# Patient Record
Sex: MALE | Race: WHITE | NOT HISPANIC OR LATINO
[De-identification: names, ages, dates, MRNs, and addresses within clinical notes are randomized per-mention and may not be internally consistent; named-entity substitution may affect disease eponyms.]

---

## 2019-05-20 PROBLEM — Z00.00 ENCOUNTER FOR PREVENTIVE HEALTH EXAMINATION: Status: ACTIVE | Noted: 2019-05-20

## 2019-05-23 ENCOUNTER — APPOINTMENT (OUTPATIENT)
Dept: NEUROLOGY | Facility: CLINIC | Age: 64
End: 2019-05-23
Payer: COMMERCIAL

## 2019-05-23 VITALS
HEART RATE: 73 BPM | BODY MASS INDEX: 34.3 KG/M2 | HEIGHT: 71 IN | WEIGHT: 245 LBS | DIASTOLIC BLOOD PRESSURE: 73 MMHG | SYSTOLIC BLOOD PRESSURE: 158 MMHG

## 2019-05-23 DIAGNOSIS — I25.10 ATHEROSCLEROTIC HEART DISEASE OF NATIVE CORONARY ARTERY W/OUT ANGINA PECTORIS: ICD-10-CM

## 2019-05-23 DIAGNOSIS — G45.9 TRANSIENT CEREBRAL ISCHEMIC ATTACK, UNSPECIFIED: ICD-10-CM

## 2019-05-23 DIAGNOSIS — Z82.3 FAMILY HISTORY OF STROKE: ICD-10-CM

## 2019-05-23 DIAGNOSIS — Z78.9 OTHER SPECIFIED HEALTH STATUS: ICD-10-CM

## 2019-05-23 PROCEDURE — 99244 OFF/OP CNSLTJ NEW/EST MOD 40: CPT

## 2019-05-23 RX ORDER — DICYCLOMINE HYDROCHLORIDE 10 MG/1
10 CAPSULE ORAL
Qty: 30 | Refills: 0 | Status: ACTIVE | COMMUNITY
Start: 2018-12-14

## 2019-05-23 RX ORDER — TAMSULOSIN HYDROCHLORIDE 0.4 MG/1
0.4 CAPSULE ORAL
Qty: 90 | Refills: 0 | Status: ACTIVE | COMMUNITY
Start: 2019-01-29

## 2019-05-23 RX ORDER — LOSARTAN POTASSIUM 50 MG/1
50 TABLET, FILM COATED ORAL
Qty: 90 | Refills: 0 | Status: ACTIVE | COMMUNITY
Start: 2019-01-29

## 2019-05-23 RX ORDER — LIFITEGRAST 50 MG/ML
5 SOLUTION/ DROPS OPHTHALMIC
Qty: 60 | Refills: 0 | Status: ACTIVE | COMMUNITY
Start: 2019-03-07

## 2019-05-23 RX ORDER — AZELASTINE HYDROCHLORIDE 137 UG/1
0.1 SPRAY, METERED NASAL
Qty: 30 | Refills: 0 | Status: ACTIVE | COMMUNITY
Start: 2018-12-27

## 2019-05-23 RX ORDER — CARVEDILOL 6.25 MG/1
6.25 TABLET, FILM COATED ORAL
Qty: 180 | Refills: 0 | Status: ACTIVE | COMMUNITY
Start: 2019-04-02

## 2019-05-23 NOTE — HISTORY OF PRESENT ILLNESS
[FreeTextEntry1] : Mr. Moore is known to me from my previous practice. \par He has multiple problems including memory complaints, paresthesias and obstructive sleep apnea.\par \par He typically goes to bed between 10-10:30 PM. At times he is restless. \par He wakes up at 4 AM and has difficulty falling back to sleep.\par He is concerned that his CPAP equipment is not functioning optimally. He is noticing some leakage. \par He is using a nasal mask.\par He is not aware of opening his mouth during sleep. His mouth is not dry in the morning.\par His wife does not notice snoring when he uses it at night.\par \par He denies having any symptoms of Restless Legs Syndrome. His wife does not report kicking of his legs at night.\par \par He has been diagnosed with neuropathy.\par He wears wrist splints at night for carpal tunnel syndrome. \par \par He is somewhat tired during the day.\par He does take naps during the day.\par \par There is no history of dream enactment behavior, hypnagogic/hypnopompic hallucinations, sleep paralysis or cataplexy.\par \par His Sherwood Sleepiness Scale Score is 18/24.\par \par

## 2019-05-23 NOTE — CONSULT LETTER
[Dear  ___] : Dear  [unfilled], [Consult Letter:] : I had the pleasure of evaluating your patient, [unfilled]. [Please see my note below.] : Please see my note below. [Consult Closing:] : Thank you very much for allowing me to participate in the care of this patient.  If you have any questions, please do not hesitate to contact me. [FreeTextEntry2] : Shelby Archuleta [FreeTextEntry3] : Sincerely,\par \par \par Angi Pierson MD\par Diplomate, American Academy of Psychiatry and Neurology\par Board Certified in the Subspecialty of Clinical Neurophysiology\par Board Certified in the Subspecialty of Sleep Medicine\par Board Certified in the Subspecialty of Epilepsy\par

## 2019-05-23 NOTE — DATA REVIEWED
[de-identified] : MRI brain 4/25/16: minimal white matter microvascular disease [de-identified] : Neurotrax 5/24/16: Mild cognitive impairment. Cognitive domain score for verbal function is impaired [de-identified] : \par \par Split Night Study 5/9/17: AHI 62.5 No PLMS\par Therapeutic portion: optimal pressure at 12 cm H2O\par

## 2019-05-23 NOTE — PHYSICAL EXAM
[FreeTextEntry1] : Examination:\par Constitutional: normal, no apparent distress\par Eyes: normal conjunctiva b/l, no ptosis, visual fields full\par oropharynx: narrow\par Respiratory: no respiratory distress, normal effort, normal auscultation\par Cardiovascular: normal rate, rhythm, no murmurs\par Neck: supple, no masses\par Vascular: carotids normal\par Skin: normal color, no rashes\par Psych: normal mood, affect\par \par Neurological:\par Memory: normal memory, oriented to person, place, time\par Language intact/no aphasia\par Cranial Nerves: II-XII intact, Pupils equally round and reactive to light, ocular muscles/movements intact, no ptosis, no facial weakness, tongue protrudes normally in the midline, \par Motor: normal tone, no pronator drift, full strength in all four extremities in the proximal and distal muscle groups\par Coordination: Fine motor movements intact, rapid alternating movements intact, finger to nose intact bilaterally\par Sensory: intact to light touch\par DTRs: symmetric,diffusely hypoactive\par Gait: narrow based, steady\par \par \par

## 2019-05-23 NOTE — REVIEW OF SYSTEMS
[Feeling Tired] : feeling tired [As Noted in HPI] : as noted in HPI [Cough] : cough [Heartburn] : heartburn [Negative] : Genitourinary [FreeTextEntry2] : night sweats [de-identified] : impaired memory [FreeTextEntry9] : back pain

## 2019-05-23 NOTE — DISCUSSION/SUMMARY
[FreeTextEntry1] : Mr. Moore is a 63 year old man with multiple medical problems including obstructive sleep apnea, neuropathy, mild cognitive impairment.\par \par 1. Obstructive sleep apnea - Previous polysomnogram showed evidence of severe MILLICENT. He has been compliant with CPAP at 12 cm H2O but continues to have daytime sleepiness.\par His [employer] company will transfer his data so that I can review efficacy and compliance data. If needed I will increase the pressure.\par A new nasal mask and hose will be ordered.\par If compliance and efficacy numbers look optimal I will consider addition of Modafinil.\par \par 2. Mild cognitive impairment - He had adverse effects to Aricept in the past. Continue use of CPAP for MILLICENT. Continue to participate in physically and mentally stimulating activities. \par \par 3. Neuropathy - Blood sugar control. Continue gabapentin.\par \par Follow-up in 6-12 months, sooner if needed.

## 2020-01-15 ENCOUNTER — APPOINTMENT (OUTPATIENT)
Dept: NEUROLOGY | Facility: CLINIC | Age: 65
End: 2020-01-15
Payer: MEDICARE

## 2020-01-15 VITALS
HEART RATE: 73 BPM | WEIGHT: 240 LBS | DIASTOLIC BLOOD PRESSURE: 73 MMHG | HEIGHT: 71 IN | SYSTOLIC BLOOD PRESSURE: 134 MMHG | BODY MASS INDEX: 33.6 KG/M2

## 2020-01-15 PROCEDURE — 99214 OFFICE O/P EST MOD 30 MIN: CPT

## 2020-01-15 NOTE — HISTORY OF PRESENT ILLNESS
[FreeTextEntry1] : I last saw Mr. Moore on 5/23/19.\par \par He says that he describes himself as being very ambivalent and having no motivation.\par He is not sure if he is depressed. He does state that things that used to be enjoyable are not as enjoyable to him. He says that he has the "January blues".\par He has some difficulty multi tasking. \par He is taking citalopram.\par \par The tingling in his extremities has increased. He is using wrist splints for carpal tunnel. He is also taking gabapentin.\par \par He has been seeing Dr. Jean for pain management and has had some improvement with injections.\par \par He had an upper respiratory infection in November and since that time has had vertigo with a spinning sensation with movement. He feels wobbly if he stands up too quickly.\par He was also diagnosed with a sinus infection when his symptoms did not improve.\par He has not done any vestibular therapy.\par \par He does not feel sleepy.\par His compliance report shows that he is using his CPAP machine about 8.5 hours per night. The average AHI is 0.3.

## 2020-01-15 NOTE — DISCUSSION/SUMMARY
[FreeTextEntry1] : Mr. Moore is a 63 year old man with multiple medical problems including obstructive sleep apnea, neuropathy, mild cognitive impairment. He now has vertigo as well as worsening of mood.\par \par 1. Obstructive sleep apnea - Previous polysomnogram showed evidence of severe MILLICENT. He has been compliant with CPAP at 12 cm H2O. He feels a benefit and will continue to use CPAP on a nightly basis.\par \par 2. Mild cognitive impairment - He had adverse effects to Aricept in the past. Continue use of CPAP for MILLICENT. Continue to participate in physically and mentally stimulating activities. There may also be an element of depression contributing. Will increase citalopram as below.\par \par 3. Neuropathy - Blood sugar control. Continue gabapentin.\par \par 4. Vertigo - LIkely peripheral vertigo related to recent URI/sinus infection (this is when symptoms started). Given persistence of symptoms will refer to vestibular therapy. If vestibular therapy is not successful will get MRI brain.\par \par 5. Depression, possible seasonal affective disorder - Increase citalopram from 10 mg to 20 mg. Discussed possible side effects.\par \par Follow-up in 3-4 months, sooner if needed.

## 2020-01-15 NOTE — DATA REVIEWED
[de-identified] : MRI brain 4/25/16: minimal white matter microvascular disease [de-identified] : Neurotrax 5/24/16: Mild cognitive impairment. Cognitive domain score for verbal function is impaired [de-identified] : \par Split Night Study 5/9/17: AHI 62.5 No PLMS\par Therapeutic portion: optimal pressure at 12 cm H2O\par

## 2020-03-20 ENCOUNTER — APPOINTMENT (OUTPATIENT)
Dept: NEUROLOGY | Facility: CLINIC | Age: 65
End: 2020-03-20

## 2020-03-20 ENCOUNTER — TRANSCRIPTION ENCOUNTER (OUTPATIENT)
Age: 65
End: 2020-03-20

## 2020-06-01 ENCOUNTER — APPOINTMENT (OUTPATIENT)
Dept: NEUROLOGY | Facility: CLINIC | Age: 65
End: 2020-06-01

## 2020-07-05 ENCOUNTER — RX RENEWAL (OUTPATIENT)
Age: 65
End: 2020-07-05

## 2020-08-10 ENCOUNTER — RX RENEWAL (OUTPATIENT)
Age: 65
End: 2020-08-10

## 2020-09-08 ENCOUNTER — RX RENEWAL (OUTPATIENT)
Age: 65
End: 2020-09-08

## 2020-10-28 ENCOUNTER — APPOINTMENT (OUTPATIENT)
Dept: NEUROLOGY | Facility: CLINIC | Age: 65
End: 2020-10-28
Payer: MEDICARE

## 2020-10-28 VITALS
WEIGHT: 240 LBS | DIASTOLIC BLOOD PRESSURE: 78 MMHG | BODY MASS INDEX: 33.6 KG/M2 | HEART RATE: 74 BPM | TEMPERATURE: 97.5 F | HEIGHT: 71 IN | SYSTOLIC BLOOD PRESSURE: 140 MMHG

## 2020-10-28 PROCEDURE — 99214 OFFICE O/P EST MOD 30 MIN: CPT

## 2020-10-28 NOTE — CONSULT LETTER
[Dear  ___] : Dear  [unfilled], [Consult Letter:] : I had the pleasure of evaluating your patient, [unfilled]. [Please see my note below.] : Please see my note below. [Consult Closing:] : Thank you very much for allowing me to participate in the care of this patient.  If you have any questions, please do not hesitate to contact me. [FreeTextEntry2] : Shelby Archuleta [FreeTextEntry3] : Sincerely,\par \par \par Angi iPerson MD\par Diplomate, American Academy of Psychiatry and Neurology\par Board Certified in the Subspecialty of Clinical Neurophysiology\par Board Certified in the Subspecialty of Sleep Medicine\par Board Certified in the Subspecialty of Epilepsy\par

## 2020-10-28 NOTE — DATA REVIEWED
[de-identified] : MRI brain 4/25/16: minimal white matter microvascular disease [de-identified] : Neurotrax 5/24/16: Mild cognitive impairment. Cognitive domain score for verbal function is impaired [de-identified] : \par Split Night Study 5/9/17: AHI 62.5 No PLMS\par Therapeutic portion: optimal pressure at 12 cm H2O\par

## 2020-10-28 NOTE — HISTORY OF PRESENT ILLNESS
[FreeTextEntry1] : I last saw Mr. Moore on 1/15/20.\par \par He recently had a removal of a ganglion cyst on the left hand.\par \par Today he reports several concerns.\par \par Memory:\par -He reports difficulty with processing information\par -Difficulty with multi-tasking\par -Forgetfulness - forgetting to zip up pants after using the rest room, difficulty navigating in familiar places\par -His family has noticed that he is repetitive - asks the same questions.\par \par Neuropathy\par -Numbness and tingling in fingers and feet\par \par Vestibular dysfunction\par -Difficulty with balance\par -He did not get around to going for vestibular therapy due to the pandemic.\par \par He also reports seeing floaters in his left eye.\par \par He sleeps well when he sleeps on his back. \par he has no difficulty with his CPAP machine. \par \par HIs back is feeling much better. He had an epidural injection a few months ago.

## 2020-10-28 NOTE — PHYSICAL EXAM
[FreeTextEntry1] : Examination:\par Constitutional: normal, no apparent distress\par Eyes: normal conjunctiva b/l, no ptosis, visual fields full\par Respiratory: no respiratory distress, normal effort, normal auscultation\par Cardiovascular: normal rate, rhythm, no murmurs\par Neck: supple, no masses\par Vascular: carotids normal\par Skin: normal color, no rashes\par Psych: normal mood, affect\par \par Neurological:\par Memory:  oriented to person, place, time\par Language intact/no aphasia\par Cranial Nerves: II-XII intact, Pupils equally round and reactive to light, ocular muscles/movements intact, no ptosis, no facial weakness, tongue protrudes normally in the midline, \par Motor: normal tone, no pronator drift, full strength in all four extremities in the proximal and distal muscle groups\par Coordination: Fine motor movements intact, rapid alternating movements intact, finger to nose intact bilaterally\par Sensory: intact to light touch\par DTRs: symmetric, normal\par Gait: narrow based, steady\par

## 2020-10-28 NOTE — DISCUSSION/SUMMARY
[FreeTextEntry1] : Mr. Moore is a 64 year old man with multiple medical problems including obstructive sleep apnea, neuropathy, mild cognitive impairment. He now has vertigo as well as worsening of mood.\par \par 1. Obstructive sleep apnea - Previous polysomnogram showed evidence of severe MILLICENT. He has been compliant with CPAP at 12 cm H2O. He feels a benefit and will continue to use CPAP on a nightly basis.\par \par 2. Mild cognitive impairment - He had adverse effects to Aricept in the past. Continue use of CPAP for MILLICENT. \par Will refer for formal neuropsychological evaluation with Dr. Garcia.\par If evidence of mild dementia, can start Namenda.\par Continue to participate in physically and mentally stimulating activities. \par He reports that mood is good at this time but he has difficulty getting motivated. \par \par 3. Neuropathy - Blood sugar control. Continue gabapentin.\par \par 4. Vertigo - LIkely peripheral vertigo. \par Referral to vestibular therapy. \par Repeat MRI brain if therapy is not helpful. \par \par 5. Depression, possible seasonal affective disorder\par Continue citalopram 20 mg\par \par Follow-up in 3-4 months, sooner if needed. \par \par

## 2021-02-09 ENCOUNTER — LABORATORY RESULT (OUTPATIENT)
Age: 66
End: 2021-02-09

## 2021-02-09 ENCOUNTER — APPOINTMENT (OUTPATIENT)
Dept: PULMONOLOGY | Facility: CLINIC | Age: 66
End: 2021-02-09
Payer: MEDICARE

## 2021-02-09 VITALS
SYSTOLIC BLOOD PRESSURE: 140 MMHG | DIASTOLIC BLOOD PRESSURE: 80 MMHG | HEART RATE: 76 BPM | WEIGHT: 254 LBS | HEIGHT: 71 IN | OXYGEN SATURATION: 98 % | TEMPERATURE: 97.9 F | BODY MASS INDEX: 35.56 KG/M2

## 2021-02-09 DIAGNOSIS — Q21.1 ATRIAL SEPTAL DEFECT: ICD-10-CM

## 2021-02-09 DIAGNOSIS — Z82.49 FAMILY HISTORY OF ISCHEMIC HEART DISEASE AND OTHER DISEASES OF THE CIRCULATORY SYSTEM: ICD-10-CM

## 2021-02-09 PROCEDURE — 99205 OFFICE O/P NEW HI 60 MIN: CPT

## 2021-02-09 RX ORDER — AZITHROMYCIN 250 MG/1
250 TABLET, FILM COATED ORAL
Qty: 6 | Refills: 0 | Status: COMPLETED | COMMUNITY
Start: 2019-03-14 | End: 2021-02-09

## 2021-02-09 RX ORDER — TIZANIDINE 4 MG/1
4 TABLET ORAL
Qty: 60 | Refills: 0 | Status: DISCONTINUED | COMMUNITY
Start: 2019-03-13 | End: 2021-02-09

## 2021-02-09 RX ORDER — TRAMADOL HYDROCHLORIDE 50 MG/1
50 TABLET, COATED ORAL
Qty: 20 | Refills: 0 | Status: DISCONTINUED | COMMUNITY
Start: 2018-12-11 | End: 2021-02-09

## 2021-02-09 RX ORDER — AMOXICILLIN 500 MG/1
500 CAPSULE ORAL
Qty: 30 | Refills: 0 | Status: DISCONTINUED | COMMUNITY
Start: 2019-01-24 | End: 2021-02-09

## 2021-02-09 RX ORDER — AMOXICILLIN AND CLAVULANATE POTASSIUM 875; 125 MG/1; MG/1
875-125 TABLET, COATED ORAL
Qty: 20 | Refills: 0 | Status: DISCONTINUED | COMMUNITY
Start: 2019-01-09 | End: 2021-02-09

## 2021-02-09 RX ORDER — METHYLPREDNISOLONE 4 MG/1
4 TABLET ORAL
Qty: 21 | Refills: 0 | Status: DISCONTINUED | COMMUNITY
Start: 2019-01-09 | End: 2021-02-09

## 2021-02-09 NOTE — HISTORY OF PRESENT ILLNESS
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 Patient states that his cough occurred acutely without associated chest pains, palpitations, lightheadedness, dizziness, leg edema, paroxysmal nocturnal dyspnea, orthopnea. He has had no complaints of nocturnal awakenings secondary to the above. History is negative for rashes, myalgias, arthralgias. No history of new exposures. Patient denies any prior history of nausea, vomiting, or diarrhea.\par \par Patient is compliant with his CPAP of 12, being managed at another practice. No complaints of excessive daytime somnolence, snoring, witnessed apneas on therapy. \par \par Patient was seen by ENT regarding this and except for a benign lesion on his glottis. No other findings were noted. A CAT scan of the chest was performed [ESS] : 0

## 2021-02-09 NOTE — DISCUSSION/SUMMARY
[FreeTextEntry1] : 65-year-old male, seen today for complaints of cough. Incidentally, the patient was found to have multiple small tumor nodules, largest measuring 10 mm in size most localized to the right upper and lower lobes. These ill-defined lesions may be postinflammatory in nature such as residua from Covid 19, hypersensitivity pneumonitis, early, AIP, vasculitis. This is somewhat inconsistent with his cough in light of the lack of severity of these lesions in number and size. Questionable reactive airways disease.

## 2021-02-09 NOTE — CONSULT LETTER
[Dear  ___] : Dear  [unfilled], [Please see my note below.] : Please see my note below. [Consult Letter:] : I had the pleasure of evaluating your patient, [unfilled]. [Consult Closing:] : Thank you very much for allowing me to participate in the care of this patient.  If you have any questions, please do not hesitate to contact me. [Sincerely,] : Sincerely, [FreeTextEntry3] : Jb Young MD FCCP\par Pulmonary/Critical Care/Sleep Medicine\par Department of Internal Medicine\par \par [facility] [employer]\par  [DrKenneth  ___] : Dr. GALINDO

## 2021-02-09 NOTE — PHYSICAL EXAM
[No Acute Distress] : no acute distress [Normal Oropharynx] : normal oropharynx [IV] : Mallampati Class: IV [Normal Appearance] : normal appearance [Neck Circumference: ___] : neck circumference: [unfilled] [No Neck Mass] : no neck mass [Normal Rate/Rhythm] : normal rate/rhythm [Normal S1, S2] : normal s1, s2 [No Murmurs] : no murmurs [No Resp Distress] : no resp distress [Clear to Auscultation Bilaterally] : clear to auscultation bilaterally [No Abnormalities] : no abnormalities [Benign] : benign [Normal Gait] : normal gait [No Clubbing] : no clubbing [No Cyanosis] : no cyanosis [No Edema] : no edema [FROM] : FROM [Normal Color/ Pigmentation] : normal color/ pigmentation [No Focal Deficits] : no focal deficits [Oriented x3] : oriented x3 [Normal Affect] : normal affect [TextBox_2] : Obese

## 2021-02-10 LAB
CK SERPL-CCNC: 94 U/L
MPO AB + PR3 PNL SER: NORMAL
NT-PROBNP SERPL-MCNC: 21 PG/ML
RHEUMATOID FACT SER QL: 10 IU/ML
SARS-COV-2 IGG SERPL IA-ACNC: 0.06 INDEX
SARS-COV-2 IGG SERPL QL IA: NEGATIVE

## 2021-02-11 LAB
ALDOLASE SERPL-CCNC: 7.5 U/L
ANA PAT FLD IF-IMP: ABNORMAL
ANA SER IF-ACNC: ABNORMAL
DSDNA AB SER-ACNC: <12 IU/ML
ENA JO1 AB SER IA-ACNC: <0.2 AL
ENA SCL70 IGG SER IA-ACNC: <0.2 AL
FUNGITELL QUANTITATIVE VALUE: <31 PG/ML
M TB IFN-G BLD-IMP: NEGATIVE
QUANTIFERON TB PLUS MITOGEN MINUS NIL: >10 IU/ML
QUANTIFERON TB PLUS NIL: 0.02 IU/ML
QUANTIFERON TB PLUS TB1 MINUS NIL: 0 IU/ML
QUANTIFERON TB PLUS TB2 MINUS NIL: 0 IU/ML

## 2021-02-11 RX ORDER — ALBUTEROL SULFATE 90 UG/1
108 (90 BASE) POWDER, METERED RESPIRATORY (INHALATION) EVERY 4 HOURS
Qty: 1 | Refills: 5 | Status: COMPLETED | COMMUNITY
Start: 2021-02-09 | End: 2021-02-11

## 2021-02-12 DIAGNOSIS — Z01.818 ENCOUNTER FOR OTHER PREPROCEDURAL EXAMINATION: ICD-10-CM

## 2021-02-13 ENCOUNTER — APPOINTMENT (OUTPATIENT)
Dept: DISASTER EMERGENCY | Facility: CLINIC | Age: 66
End: 2021-02-13

## 2021-02-14 LAB — SARS-COV-2 N GENE NPH QL NAA+PROBE: NOT DETECTED

## 2021-02-16 ENCOUNTER — APPOINTMENT (OUTPATIENT)
Dept: PULMONOLOGY | Facility: CLINIC | Age: 66
End: 2021-02-16
Payer: MEDICARE

## 2021-02-16 VITALS
RESPIRATION RATE: 16 BRPM | HEART RATE: 63 BPM | DIASTOLIC BLOOD PRESSURE: 70 MMHG | OXYGEN SATURATION: 96 % | SYSTOLIC BLOOD PRESSURE: 120 MMHG

## 2021-02-16 VITALS — BODY MASS INDEX: 36.2 KG/M2 | WEIGHT: 250 LBS | TEMPERATURE: 97.3 F | HEIGHT: 69.5 IN

## 2021-02-16 DIAGNOSIS — R91.8 OTHER NONSPECIFIC ABNORMAL FINDING OF LUNG FIELD: ICD-10-CM

## 2021-02-16 DIAGNOSIS — R05 COUGH: ICD-10-CM

## 2021-02-16 PROCEDURE — 99214 OFFICE O/P EST MOD 30 MIN: CPT | Mod: 25

## 2021-02-16 PROCEDURE — 94010 BREATHING CAPACITY TEST: CPT

## 2021-02-16 PROCEDURE — 85018 HEMOGLOBIN: CPT | Mod: QW

## 2021-02-16 PROCEDURE — 94727 GAS DIL/WSHOT DETER LNG VOL: CPT

## 2021-02-16 PROCEDURE — 94729 DIFFUSING CAPACITY: CPT

## 2021-02-16 NOTE — CONSULT LETTER
[Dear  ___] : Dear  [unfilled], [Consult Letter:] : I had the pleasure of evaluating your patient, [unfilled]. [Please see my note below.] : Please see my note below. [Consult Closing:] : Thank you very much for allowing me to participate in the care of this patient.  If you have any questions, please do not hesitate to contact me. [Sincerely,] : Sincerely, [DreKnneth  ___] : Dr. GALINDO [FreeTextEntry3] : Jb Young MD FCCP\par Pulmonary/Critical Care/Sleep Medicine\par Department of Internal Medicine\par \par [facility]\par

## 2021-02-16 NOTE — DISCUSSION/SUMMARY
[FreeTextEntry1] : 64 yo male, seen today for complaints of a chronic cough. Patient's symptoms have spontaneously improved. Lung lesion is most likely inflammatory in nature. No evidence of connective tissue diseases. Patient advised to continue to use his short acting bronchodilator as needed. Followup CAT scan in 3 months. Patient advised he still may require an open lung biopsy

## 2021-02-16 NOTE — HISTORY OF PRESENT ILLNESS
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 Patient states that his cough occurred acutely without associated chest pains, palpitations, lightheadedness, dizziness, leg edema, paroxysmal nocturnal dyspnea, orthopnea. He has had no complaints of nocturnal awakenings secondary to the above. History is negative for rashes, myalgias, arthralgias. No history of new exposures. Patient denies any prior history of nausea, vomiting, or diarrhea.\par \par Patient is compliant with his CPAP of 12, being managed at another practice. No complaints of excessive daytime somnolence, snoring, witnessed apneas on therapy. \par \par Patient was seen by ENT regarding this and except for a benign lesion on his glottis. No other findings were noted. A CAT scan of the chest was performed and the patient was found to have multiple areas of ill-defined nodule in the changes up to 10 mm in size in the right upper lobe with smaller lesions in the right lower lobe. No bronchiectasis\par \par he was felt to have possible reactive airways disease ith residual from Covid 19 versus hypersensitivity pneumonitis versus AIP versus vasculitis.Serologies were ordered as below.\par \par 2/16/21: [Awakes Unrefreshed] : does not awaken unrefreshed [Awakes with Dry Mouth] : does not awaken with dry mouth [Snoring] : no snoring [Witnessed Apneas] : no witnessed apneas [TextBox_100] : 5/9/17 [TextBox_108] : 62.5 [TextBox_104] : 5/9/17 [TextBox_131] : 12 [TextBox_165] : managed by another group [ESS] : 0

## 2021-03-12 ENCOUNTER — APPOINTMENT (OUTPATIENT)
Dept: NEUROLOGY | Facility: CLINIC | Age: 66
End: 2021-03-12
Payer: MEDICARE

## 2021-03-12 VITALS
DIASTOLIC BLOOD PRESSURE: 78 MMHG | SYSTOLIC BLOOD PRESSURE: 138 MMHG | HEIGHT: 69.5 IN | TEMPERATURE: 97.6 F | HEART RATE: 76 BPM

## 2021-03-12 PROCEDURE — 99214 OFFICE O/P EST MOD 30 MIN: CPT

## 2021-03-12 NOTE — DATA REVIEWED
[de-identified] : MRI brain 4/25/16: minimal white matter microvascular disease [de-identified] : Neurotrax 5/24/16: Mild cognitive impairment. Cognitive domain score for verbal function is impaired\par \par Neuropsych evaluation January 2021: Mild cognitive disorder, Dysthymic disorder [de-identified] : \par Split Night Study 5/9/17: AHI 62.5 No PLMS\par Therapeutic portion: optimal pressure at 12 cm H2O\par

## 2021-03-12 NOTE — HISTORY OF PRESENT ILLNESS
[FreeTextEntry1] : Mr. Moore was last seen on 10/28/20.\par \par He is here today with his wife.\par He notices that his memory is not as good as it used to be.\par \par He had neuropsych testing which was suggestive of mild cognitive impairment and at this time is more reflective of a persistent mild cognitive impairment as opposed as a dementing process.\par \par His wife does think that he is depressed.\par He describes himself as "Eeyore" from [employer].\par He does think that citalopram has helped. He is taking 20 mg.\par He feels that citalopram allows him to cope with stress.\par \par He is sleeping well.\par He is using his CPAP on a nightly basis. He needs new supplies.\par \par Dizziness is improved overall.\par \par Metformin was increased.

## 2021-03-12 NOTE — DISCUSSION/SUMMARY
[FreeTextEntry1] : iscussion/Summary\par Mr. Moore is a 65 year old man with multiple medical problems including obstructive sleep apnea, neuropathy, mild cognitive impairment. He now has vertigo as well as worsening of mood.\par \par 1. Obstructive sleep apnea - Previous polysomnogram showed evidence of severe MILLICENT. He has been compliant with CPAP at 12 cm H2O. He feels a benefit and will continue to use CPAP on a nightly basis.\par I will send an order for new supplies\par \par 2. Mild cognitive impairment - Neuropsych testing shows stable MCI and does not suggest a progressive dementing process.\par Continue to participate in physically and mentally stimulating activities. \par Dysthymic disorder also likely contributing.\par \par 3. Neuropathy - Blood sugar control. Continue gabapentin.\par \par 4. Vertigo - LIkely peripheral vertigo. \par Overall symptoms are much improved.\par \par 5. Depression, dysthymic disorder.\par Will increase citalopram to 30 mg/day. if he has side effects, decrease back to 20 mg/day.\par \par Follow-up in 4-6 months, sooner if needed. \par

## 2021-03-12 NOTE — PHYSICAL EXAM
[FreeTextEntry1] : Examination:\par Constitutional: normal, no apparent distress\par Eyes: normal conjunctiva b/l, no ptosis, visual fields full\par Respiratory: no respiratory distress, normal effort, normal auscultation\par Cardiovascular: normal rate, rhythm, no murmurs\par Neck: supple, no masses\par Vascular: carotids normal\par Skin: normal color, no rashes\par Psych: normal mood, affect\par \par Neurological:\par Memory: normal memory, oriented to person, place, time\par Language intact/no aphasia\par Cranial Nerves: II-XII intact, Pupils equally round and reactive to light, ocular muscles/movements intact, no ptosis, no facial weakness, tongue protrudes normally in the midline, \par Motor: normal tone, no pronator drift, full strength in all four extremities in the proximal and distal muscle groups\par Coordination: Fine motor movements intact, rapid alternating movements intact, finger to nose intact bilaterally\par Sensory: intact to light touch\par DTRs: symmetric, hypoactive,.\par Gait: narrow based, steady\par

## 2021-05-01 ENCOUNTER — APPOINTMENT (OUTPATIENT)
Dept: DISASTER EMERGENCY | Facility: CLINIC | Age: 66
End: 2021-05-01

## 2021-05-04 ENCOUNTER — APPOINTMENT (OUTPATIENT)
Dept: PULMONOLOGY | Facility: CLINIC | Age: 66
End: 2021-05-04

## 2021-05-04 RX ORDER — OFLOXACIN OTIC 3 MG/ML
0.3 SOLUTION AURICULAR (OTIC)
Qty: 5 | Refills: 0 | Status: DISCONTINUED | COMMUNITY
Start: 2019-01-24 | End: 2021-05-04

## 2021-05-04 RX ORDER — CLOPIDOGREL BISULFATE 75 MG/1
75 TABLET, FILM COATED ORAL
Qty: 90 | Refills: 0 | Status: DISCONTINUED | COMMUNITY
Start: 2019-02-23 | End: 2021-05-04

## 2021-05-04 RX ORDER — CITALOPRAM HYDROBROMIDE 20 MG/1
20 TABLET, FILM COATED ORAL
Qty: 30 | Refills: 2 | Status: DISCONTINUED | COMMUNITY
Start: 2020-01-15 | End: 2021-05-04

## 2021-05-04 RX ORDER — CITALOPRAM HYDROBROMIDE 10 MG/1
10 TABLET, FILM COATED ORAL
Qty: 90 | Refills: 0 | Status: DISCONTINUED | COMMUNITY
Start: 2019-02-23 | End: 2021-05-04

## 2021-05-04 RX ORDER — NEOMYCIN SULFATE, POLYMYXIN B SULFATE AND DEXAMETHASONE 3.5; 10000; 1 MG/ML; [USP'U]/ML; MG/ML
3.5-10000-0.1 SUSPENSION OPHTHALMIC
Qty: 5 | Refills: 0 | Status: DISCONTINUED | COMMUNITY
Start: 2019-01-24 | End: 2021-05-04

## 2021-06-30 ENCOUNTER — APPOINTMENT (OUTPATIENT)
Dept: ENDOCRINOLOGY | Facility: CLINIC | Age: 66
End: 2021-06-30
Payer: MEDICARE

## 2021-06-30 VITALS
HEART RATE: 90 BPM | HEIGHT: 69.5 IN | BODY MASS INDEX: 35.47 KG/M2 | WEIGHT: 245 LBS | DIASTOLIC BLOOD PRESSURE: 70 MMHG | SYSTOLIC BLOOD PRESSURE: 122 MMHG

## 2021-06-30 LAB
GLUCOSE BLDC GLUCOMTR-MCNC: 107
HBA1C MFR BLD HPLC: 7.4

## 2021-06-30 PROCEDURE — 82962 GLUCOSE BLOOD TEST: CPT

## 2021-06-30 PROCEDURE — 99204 OFFICE O/P NEW MOD 45 MIN: CPT | Mod: 25

## 2021-06-30 RX ORDER — ASPIRIN 81 MG
81 TABLET, DELAYED RELEASE (ENTERIC COATED) ORAL DAILY
Qty: 90 | Refills: 0 | Status: ACTIVE | COMMUNITY

## 2021-06-30 NOTE — REASON FOR VISIT
[Initial Evaluation] : an initial evaluation [DM Type 2] : DM Type 2 [Other___] : [unfilled] [FreeTextEntry2] : Dr. Macey Gutierrez

## 2021-06-30 NOTE — ASSESSMENT
[Importance of Diet and Exercise] : importance of diet and exercise to improve glycemic control, achieve weight loss and improve cardiovascular health [FreeTextEntry1] : 66 y/o male with hx of TIA and MI along with type 2 diabetes and hyperlipidemia. \par \par Plan: \par Type 2 DM: \par - continue Metformin for now, may replace with Jardiance in the future for added cardiac protection, will check renal function first \par - educated on healthy food choices\par - increase routine exercise \par - schedule appointment with [facility] glucose meter teaching, diabetes and diet education \par \par Hyperlipidemia: check lipids now \par - continue statin \par \par RTO in 6-8 weeks \par

## 2021-06-30 NOTE — PHYSICAL EXAM
[Alert] : alert [Well Nourished] : well nourished [No Acute Distress] : no acute distress [Well Developed] : well developed [Normal Sclera/Conjunctiva] : normal sclera/conjunctiva [EOMI] : extra ocular movement intact [Thyroid Not Enlarged] : the thyroid was not enlarged [No LAD] : no lymphadenopathy [No Thyroid Nodules] : no palpable thyroid nodules [No Respiratory Distress] : no respiratory distress [No Accessory Muscle Use] : no accessory muscle use [Normal Rate and Effort] : normal respiratory rate and effort [Clear to Auscultation] : lungs were clear to auscultation bilaterally [Normal S1, S2] : normal S1 and S2 [Normal Rate] : heart rate was normal [Regular Rhythm] : with a regular rhythm [No Edema] : no peripheral edema [Pedal Pulses Normal] : the pedal pulses are present [Normal Bowel Sounds] : normal bowel sounds [Not Tender] : non-tender [Soft] : abdomen soft [Normal Gait] : normal gait [No Rash] : no rash [Right Foot Was Examined] : right foot ~C was examined [Left Foot Was Examined] : left foot ~C was examined [Normal] : normal [#1 Diminished] : number 1 was diminished [#7 Diminished] : number 7 was diminished [#8 Diminished] : number 8 was diminished [#9 Diminished] : number 9 was diminished [No Tremors] : no tremors [Oriented x3] : oriented to person, place, and time [Acanthosis Nigricans] : no acanthosis nigricans [Foot Ulcers] : no foot ulcers [#2 Diminished] : number 2 was normal [#3 Diminished] : number 3 was normal [#4 Diminished] : number 4 was normal [#5 Diminished] : number 5 was normal [#6 Diminished] : number 6 was normal [de-identified] : mildly impaired judgement

## 2021-06-30 NOTE — REVIEW OF SYSTEMS
[Fatigue] : fatigue [Recent Weight Loss (___ Lbs)] : recent weight loss: [unfilled] lbs [Diarrhea] : diarrhea [Decreased Appetite] : appetite not decreased [Recent Weight Gain (___ Lbs)] : no recent weight gain [Visual Field Defect] : no visual field defect [Blurred Vision] : no blurred vision [Dysphagia] : no dysphagia [Neck Pain] : no neck pain [Dysphonia] : no dysphonia [Chest Pain] : no chest pain [Palpitations] : no palpitations [Constipation] : no constipation [Polyuria] : no polyuria [Dysuria] : no dysuria [Headaches] : no headaches [Tremors] : no tremors [Depression] : no depression [Polydipsia] : no polydipsia [Swelling] : no swelling [FreeTextEntry3] : floaters in eyes with tried  [FreeTextEntry7] : at times

## 2021-06-30 NOTE — HISTORY OF PRESENT ILLNESS
[FreeTextEntry1] : Patient reports in 2013 he had TIA and PFO in [place] then was started on Metformin. Over the last couple of months he has become lethargic after eating breakfast with eye floaters then he has sugar followed by nausea and diarrhea. Has not tested his blood sugar during these episodes. \par \par Quality: Type 2 DM\par Severity: moderate \par Duration: 2013 \par Onset: TIA \par Modifying Factors: Jayla with medication \par Associated Symptoms: denies polyuria or polydipsia \par Family History: Brother ans Sister with HTN \par \par Current Regimen:\par Metformin 500 mg BID \par \par Self blood sugar monitoring: none\par Current \par \par exercise: yard work, no routine exercise\par \par Diet:\par B- pancake with butter, cereal, fruit and egg with cheese on toast, 2% milk\par L- skips or cold cut sandwich, chips, unsweetened tea \par D- protein, salad, apple sauce, vegetables. \par bedtime snack - banana \par \par \par Date of last eye exam: 1/2021 (-) DR, dry eyes \par Date of last foot exam: fall with podiatry, mild neuropathy \par Date of last flu vaccine: 2020\par Date of last Pneumovax: March 2021\par \par PMH: \par HLD\par MI\par HTN \par TIA

## 2021-07-13 RX ORDER — EMPAGLIFLOZIN 10 MG/1
10 TABLET, FILM COATED ORAL DAILY
Qty: 90 | Refills: 1 | Status: DISCONTINUED | COMMUNITY
Start: 2021-07-13 | End: 2021-07-13

## 2021-07-13 RX ORDER — METFORMIN HYDROCHLORIDE 500 MG/1
500 TABLET, COATED ORAL
Qty: 180 | Refills: 0 | Status: DISCONTINUED | COMMUNITY
Start: 2019-03-19 | End: 2021-07-13

## 2021-08-04 ENCOUNTER — APPOINTMENT (OUTPATIENT)
Dept: ENDOCRINOLOGY | Facility: CLINIC | Age: 66
End: 2021-08-04
Payer: MEDICARE

## 2021-08-04 PROCEDURE — G0108 DIAB MANAGE TRN  PER INDIV: CPT

## 2021-08-05 ENCOUNTER — APPOINTMENT (OUTPATIENT)
Dept: ENDOCRINOLOGY | Facility: CLINIC | Age: 66
End: 2021-08-05
Payer: MEDICARE

## 2021-08-05 PROCEDURE — G0108 DIAB MANAGE TRN  PER INDIV: CPT

## 2021-08-05 RX ORDER — LANCETS
EACH MISCELLANEOUS
Qty: 200 | Refills: 2 | Status: ACTIVE | COMMUNITY
Start: 2021-08-04 | End: 1900-01-01

## 2021-08-10 ENCOUNTER — RX RENEWAL (OUTPATIENT)
Age: 66
End: 2021-08-10

## 2021-08-23 ENCOUNTER — APPOINTMENT (OUTPATIENT)
Dept: NEUROLOGY | Facility: CLINIC | Age: 66
End: 2021-08-23

## 2021-08-27 ENCOUNTER — APPOINTMENT (OUTPATIENT)
Dept: ENDOCRINOLOGY | Facility: CLINIC | Age: 66
End: 2021-08-27

## 2021-09-17 LAB
HBA1C MFR BLD HPLC: 7.4
LDLC SERPL DIRECT ASSAY-MCNC: 91
MICROALBUMIN/CREAT 24H UR-RTO: 3

## 2021-09-20 ENCOUNTER — APPOINTMENT (OUTPATIENT)
Dept: ENDOCRINOLOGY | Facility: CLINIC | Age: 66
End: 2021-09-20
Payer: MEDICARE

## 2021-09-20 VITALS
DIASTOLIC BLOOD PRESSURE: 74 MMHG | HEART RATE: 63 BPM | BODY MASS INDEX: 36.29 KG/M2 | WEIGHT: 245 LBS | SYSTOLIC BLOOD PRESSURE: 142 MMHG | HEIGHT: 69 IN | OXYGEN SATURATION: 98 %

## 2021-09-20 LAB — GLUCOSE BLDC GLUCOMTR-MCNC: 126

## 2021-09-20 PROCEDURE — 99214 OFFICE O/P EST MOD 30 MIN: CPT | Mod: 25

## 2021-09-20 PROCEDURE — 82962 GLUCOSE BLOOD TEST: CPT

## 2021-09-20 NOTE — PHYSICAL EXAM
[Well Nourished] : well nourished [Alert] : alert [No Acute Distress] : no acute distress [Well Developed] : well developed [Normal Sclera/Conjunctiva] : normal sclera/conjunctiva [EOMI] : extra ocular movement intact [No LAD] : no lymphadenopathy [Thyroid Not Enlarged] : the thyroid was not enlarged [No Thyroid Nodules] : no palpable thyroid nodules [No Respiratory Distress] : no respiratory distress [No Accessory Muscle Use] : no accessory muscle use [Normal Rate and Effort] : normal respiratory rate and effort [Clear to Auscultation] : lungs were clear to auscultation bilaterally [Normal S1, S2] : normal S1 and S2 [Regular Rhythm] : with a regular rhythm [Normal Rate] : heart rate was normal [No Edema] : no peripheral edema [Not Tender] : non-tender [Normal Bowel Sounds] : normal bowel sounds [Soft] : abdomen soft [Normal Gait] : normal gait [No Rash] : no rash [Acanthosis Nigricans] : no acanthosis nigricans [No Tremors] : no tremors [Oriented x3] : oriented to person, place, and time [Normal Affect] : the affect was normal [Normal Insight/Judgement] : insight and judgment were intact [Normal Mood] : the mood was normal

## 2021-09-20 NOTE — ASSESSMENT
[Importance of Diet and Exercise] : importance of diet and exercise to improve glycemic control, achieve weight loss and improve cardiovascular health [FreeTextEntry1] : 64 y/o male with hx of TIA and MI along with type 2 diabetes and hyperlipidemia. \par \par Plan: \par Type 2 DM: \par - pt. going to call insurance to see what SGLT-2 medication is covered\par - increase blood sugar monitoring to 3 times a day\par - call office with low or high blood sugars \par - educated on healthy food choices\par - increase routine exercise \par - repeat A1C before next visit \par \par Hyperlipidemia: check lipids before next visit \par - follow a low fat diet \par - continue statin \par \par RTO in 6-8 weeks \par

## 2021-09-20 NOTE — HISTORY OF PRESENT ILLNESS
[FreeTextEntry1] : Patient reports in 2013 he had TIA and PFO in [place] then was started on Metformin. Over the last couple of months he has become lethargic after eating breakfast with eye floaters then he has sugar followed by nausea and diarrhea. Has not tested his blood sugar during these episodes. \par \par Interval History: Has been working on his diet and reporting feeling low blood sugars around 10:30am during schools days. He does not always check blood sugars when feeling low. \par \par Quality: Type 2 DM\par Severity: moderate \par Duration: 2013 \par Onset: TIA \par Modifying Factors: Jayla with medication \par Associated Symptoms: denies polyuria or polydipsia \par Family History: Brother ans Sister with HTN \par \par Current Regimen:\par Metformin 500 mg BID - discontinue \par \par Self blood sugar monitoring: 3 times a week \par morning blood sugar average 173 \par lunch blood sugar average 134\par evening meal average 144 \par Current \par \par exercise: yard work, no routine exercise\par \par Diet:\par B- pancake with butter, cereal, fruit and egg with cheese on toast, 2% milk\par L- skips or cold cut sandwich, chips, unsweetened tea \par D- protein, salad, apple sauce, vegetables. \par bedtime snack - banana \par \par \par Date of last eye exam: 1/2021 (-) DR, dry eyes \par Date of last foot exam: fall with podiatry, mild neuropathy \par Date of last flu vaccine: 2020\par Date of last Pneumovax: March 2021\par COVID Vaccine: received \par \par PMH: \par HLD\par MI\par HTN \par TIA

## 2021-09-21 RX ORDER — DAPAGLIFLOZIN 5 MG/1
5 TABLET, FILM COATED ORAL
Qty: 90 | Refills: 0 | Status: DISCONTINUED | COMMUNITY
Start: 2021-07-13 | End: 2021-09-21

## 2021-09-28 ENCOUNTER — NON-APPOINTMENT (OUTPATIENT)
Age: 66
End: 2021-09-28

## 2021-10-22 ENCOUNTER — NON-APPOINTMENT (OUTPATIENT)
Age: 66
End: 2021-10-22

## 2021-10-26 ENCOUNTER — NON-APPOINTMENT (OUTPATIENT)
Age: 66
End: 2021-10-26

## 2021-10-27 RX ORDER — ERTUGLIFLOZIN 5 MG/1
5 TABLET, FILM COATED ORAL
Qty: 90 | Refills: 0 | Status: DISCONTINUED | COMMUNITY
Start: 2021-09-24 | End: 2021-10-27

## 2021-10-27 RX ORDER — EMPAGLIFLOZIN AND LINAGLIPTIN 10; 5 MG/1; MG/1
10-5 TABLET, FILM COATED ORAL
Qty: 90 | Refills: 0 | Status: DISCONTINUED | COMMUNITY
Start: 2021-09-21 | End: 2021-10-27

## 2021-11-18 LAB
HBA1C MFR BLD HPLC: 8
LDLC SERPL DIRECT ASSAY-MCNC: 98
MICROALBUMIN/CREAT 24H UR-RTO: 16

## 2021-11-19 ENCOUNTER — APPOINTMENT (OUTPATIENT)
Dept: ENDOCRINOLOGY | Facility: CLINIC | Age: 66
End: 2021-11-19
Payer: MEDICARE

## 2021-11-19 VITALS
OXYGEN SATURATION: 99 % | HEART RATE: 68 BPM | DIASTOLIC BLOOD PRESSURE: 70 MMHG | WEIGHT: 245 LBS | HEIGHT: 69 IN | SYSTOLIC BLOOD PRESSURE: 130 MMHG | BODY MASS INDEX: 36.29 KG/M2

## 2021-11-19 LAB — GLUCOSE BLDC GLUCOMTR-MCNC: 112

## 2021-11-19 PROCEDURE — 99214 OFFICE O/P EST MOD 30 MIN: CPT | Mod: 25

## 2021-11-19 PROCEDURE — 82962 GLUCOSE BLOOD TEST: CPT

## 2021-11-19 NOTE — REVIEW OF SYSTEMS
[Fatigue] : no fatigue [Decreased Appetite] : appetite not decreased [Recent Weight Gain (___ Lbs)] : no recent weight gain [Recent Weight Loss (___ Lbs)] : no recent weight loss [Visual Field Defect] : no visual field defect [Blurred Vision] : no blurred vision [Dysphagia] : no dysphagia [Neck Pain] : no neck pain [Dysphonia] : no dysphonia [Chest Pain] : no chest pain [Palpitations] : no palpitations [Constipation] : no constipation [Diarrhea] : no diarrhea [Polyuria] : no polyuria [Dysuria] : no dysuria [Headaches] : no headaches [Tremors] : no tremors [Depression] : no depression [Anxiety] : no anxiety [Polydipsia] : no polydipsia [Swelling] : no swelling [FreeTextEntry2] : weight stable  [FreeTextEntry3] : left eye floaters  [FreeTextEntry8] : increased urination with medication

## 2021-11-19 NOTE — HISTORY OF PRESENT ILLNESS
[FreeTextEntry1] : Patient reports in 2013 he had TIA and PFO in [place] then was started on Metformin. Over the last couple of months he has become lethargic after eating breakfast with eye floaters then he has sugar followed by nausea and diarrhea. Has not tested his blood sugar during these episodes. \par \par Interval History: September had another cardiac stent placed for a total of 10 cardiac stents. \par \par Quality: Type 2 DM\par Severity: moderate \par Duration: 2013 \par Onset: TIA \par Modifying Factors: Jayla with medication \par Associated Symptoms: denies polyuria or polydipsia \par Family History: Brother ans Sister with HTN \par \par Current Regimen:\par Jardiance 10 mg daily - increased urination however tolerable \par \par Self blood sugar monitoring: 3 times a week \par morning blood sugar average 152\par lunch blood sugar average 109\par evening meal average 150\par average glucose 138\par Current \par \par exercise: yard work, no routine exercise\par \par Diet:\par B- pancake with butter, cereal, fruit and egg with cheese on toast, 2% milk\par L- skips or cold cut sandwich, chips, unsweetened tea \par D- protein, salad, apple sauce, vegetables. \par bedtime snack - banana \par \par \par Date of last eye exam: 1/2021 (-) DR, dry eyes \par Date of last foot exam: fall with podiatry, mild neuropathy \par Date of last flu vaccine: 2020\par Date of last Pneumovax: March 2021\par COVID Vaccine: received \par \par PMH: \par HLD\par MI\par HTN \par TIA

## 2021-11-19 NOTE — ASSESSMENT
[FreeTextEntry1] : 64 y/o male with hx of TIA and MI along with type 2 diabetes and hyperlipidemia. \par \par Plan: \par Type 2 DM: suboptimal \par - increase Jardiance to 25 mg daily \par - continue blood sugar monitoring to 3 times a day\par - call office with low or high blood sugars \par - educated on healthy food choices\par - increase routine exercise \par - repeat A1C before next visit \par \par Hyperlipidemia: cholesterol controlled, Triglycerides elevated - start omega - 3 \par - follow a low fat diet \par - continue statin \par \par RTO in 3 months \par  [Importance of Diet and Exercise] : importance of diet and exercise to improve glycemic control, achieve weight loss and improve cardiovascular health

## 2021-11-30 ENCOUNTER — APPOINTMENT (OUTPATIENT)
Dept: NEUROLOGY | Facility: CLINIC | Age: 66
End: 2021-11-30
Payer: MEDICARE

## 2021-11-30 VITALS
HEIGHT: 71 IN | BODY MASS INDEX: 35 KG/M2 | DIASTOLIC BLOOD PRESSURE: 82 MMHG | SYSTOLIC BLOOD PRESSURE: 148 MMHG | WEIGHT: 250 LBS | HEART RATE: 72 BPM | TEMPERATURE: 97.8 F

## 2021-11-30 DIAGNOSIS — F32.A DEPRESSION, UNSPECIFIED: ICD-10-CM

## 2021-11-30 PROCEDURE — 99214 OFFICE O/P EST MOD 30 MIN: CPT

## 2021-11-30 NOTE — PHYSICAL EXAM
[FreeTextEntry1] : Examination:\par Constitutional: normal, no apparent distress\par Eyes: normal conjunctiva b/l, no ptosis, visual fields full\par Respiratory: no respiratory distress, normal effort, normal auscultation\par Cardiovascular: normal rate, rhythm, no murmurs\par Neck: supple, no masses\par Vascular: carotids normal\par Skin: normal color, no rashes\par Psych: normal mood, affect\par \par Neurological:\par Memory: oriented to person, place, time, recalled 2/3 words after 3 minutes. Completed 4 steps of serial 7s easily and 5th step with some difficulty.\par Language intact/no aphasia\par Cranial Nerves: II-XII intact, Pupils equally round and reactive to light, ocular muscles/movements intact, no ptosis, no facial weakness, tongue protrudes normally in the midline, \par Motor: normal tone, no pronator drift, full strength in all four extremities in the proximal and distal muscle groups\par Coordination: Fine motor movements intact, rapid alternating movements intact, finger to nose intact bilaterally\par Sensory: intact to light touch, JPS intact, slight decrease in vibration in left foot\par DTRs: 1+ in biceps, radialis, patellars, ankle jerks\par Gait: narrow based, steady\par

## 2021-11-30 NOTE — DISCUSSION/SUMMARY
[FreeTextEntry1] : Mr. Moore is a 65 year old man with multiple medical problems including obstructive sleep apnea, neuropathy, mild cognitive impairment. He now has vertigo as well as worsening of mood.\par \par 1. Obstructive sleep apnea - Previous polysomnogram showed evidence of severe MILLICENT. He has been compliant with CPAP at 12 cm H2O. He feels a benefit and will continue to use CPAP on a nightly basis.\par I will send an order for new supplies.\par \par 2. Mild cognitive impairment - Neuropsych testing shows stable MCI and does not suggest a progressive dementing process.\par Recent blood tests show normal vitamin B12 and TSH levels.\par Continue to participate in physically and mentally stimulating activities. \par Continue treatment of mood disturbance.\par Will try to decrease gabapentin which may provide some benefit.\par If no improvement can repeat MRI brain.\par \par 3. Neuropathy - Blood sugar control. \par He is currently taking gabapentin 600 mg q12. He can try to decrease to 300-600 and if symptoms do not worsen can decrease further to 300-300.\par \par 4. Vertigo - LIkely peripheral vertigo. \par Symptoms have resolved.\par \par 5. Depression, dysthymic disorder.\par Improvement has been noted with increased dose of citalopram. Continue citalopram 30 mg qhs.\par \par 6. Floaters - f/u with ophthalmology.\par \par Follow-up in 4-6 months, sooner if needed. \par

## 2021-11-30 NOTE — HISTORY OF PRESENT ILLNESS
[FreeTextEntry1] : Last visit 3/12/21.\par He has been diagnosed with diabetes.\par His sugars range from the upper 120s to 150s.\par He was started on Jardiance.\par \par He had been having some mild chest pain and had his 9th cardiac stent placed in September (in the [facility]).\par \par Since that time he has noted the following symptoms:\par He has had some floaters in the left eye. He saw his ophthalmologist about three months ago.\par There was no sign of diabetic retinopathy.\par \par He has a sensation of cold and numbness  in his fingers (at the tips) and in his toes.\par \par He reports feeling forgetful and having difficulty with concentration.\par He reports issues like forgetting to zip his pants or put his seatbelt on.\par \par He remains quite active.\par \par He says that he is sleeping well. He uses his CPAP machine every night. He has a ResMed machine.\par His compliance report shows that he is using CPAP for about 8.5 hours per night. The AHI is 0.7.\par \par He has not had any recent vertigo.

## 2021-11-30 NOTE — DATA REVIEWED
[de-identified] : MRI brain 4/25/16: minimal white matter microvascular disease\par \par MRI brain 2/28/19: No significant abnormality\par \par MRA brain 3/4/19: normal\par  [de-identified] : Neurotrax 5/24/16: Mild cognitive impairment. Cognitive domain score for verbal function is impaired\par \par Neuropsych evaluation January 2021: Mild cognitive disorder, Dysthymic disorder [de-identified] : \par Split Night Study 5/9/17: AHI 62.5 No PLMS\par Therapeutic portion: optimal pressure at 12 cm H2O\par \par blood tests 10/18/21: TSH 2.09, vitamin B12 592, vitamin D 31.5

## 2022-02-23 LAB
HBA1C MFR BLD HPLC: 7.8
LDLC SERPL CALC-MCNC: 95
MICROALBUMIN/CREAT 24H UR-RTO: <3

## 2022-02-24 ENCOUNTER — APPOINTMENT (OUTPATIENT)
Dept: ENDOCRINOLOGY | Facility: CLINIC | Age: 67
End: 2022-02-24
Payer: MEDICARE

## 2022-02-24 VITALS
BODY MASS INDEX: 33.6 KG/M2 | WEIGHT: 240 LBS | OXYGEN SATURATION: 95 % | SYSTOLIC BLOOD PRESSURE: 118 MMHG | HEART RATE: 73 BPM | HEIGHT: 71 IN | DIASTOLIC BLOOD PRESSURE: 72 MMHG

## 2022-02-24 LAB — GLUCOSE BLDC GLUCOMTR-MCNC: 183

## 2022-02-24 PROCEDURE — 99214 OFFICE O/P EST MOD 30 MIN: CPT | Mod: 25

## 2022-02-24 PROCEDURE — 82962 GLUCOSE BLOOD TEST: CPT

## 2022-02-24 NOTE — REVIEW OF SYSTEMS
[Recent Weight Loss (___ Lbs)] : recent weight loss: [unfilled] lbs [Fatigue] : no fatigue [Decreased Appetite] : appetite not decreased [Visual Field Defect] : no visual field defect [Blurred Vision] : no blurred vision [Dysphagia] : no dysphagia [Neck Pain] : no neck pain [Dysphonia] : no dysphonia [Chest Pain] : no chest pain [Palpitations] : no palpitations [Constipation] : no constipation [Diarrhea] : no diarrhea [Polyuria] : no polyuria [Dysuria] : no dysuria [Headaches] : no headaches [Tremors] : no tremors [Polydipsia] : no polydipsia [FreeTextEntry3] : floaters in left eye with fatigue and dehydration  [FreeTextEntry8] : increased urination and fluids

## 2022-02-24 NOTE — PHYSICAL EXAM
[Alert] : alert [Well Nourished] : well nourished [No Acute Distress] : no acute distress [Well Developed] : well developed [Normal Sclera/Conjunctiva] : normal sclera/conjunctiva [EOMI] : extra ocular movement intact [No LAD] : no lymphadenopathy [Thyroid Not Enlarged] : the thyroid was not enlarged [No Thyroid Nodules] : no palpable thyroid nodules [No Respiratory Distress] : no respiratory distress [No Accessory Muscle Use] : no accessory muscle use [Normal Rate and Effort] : normal respiratory rate and effort [Clear to Auscultation] : lungs were clear to auscultation bilaterally [Normal S1, S2] : normal S1 and S2 [Normal Rate] : heart rate was normal [Regular Rhythm] : with a regular rhythm [No Edema] : no peripheral edema [Normal Bowel Sounds] : normal bowel sounds [Not Tender] : non-tender [Soft] : abdomen soft [Normal Gait] : normal gait [No Rash] : no rash [No Tremors] : no tremors [Oriented x3] : oriented to person, place, and time [Normal Affect] : the affect was normal [Normal Insight/Judgement] : insight and judgment were intact [Normal Mood] : the mood was normal [Acanthosis Nigricans] : no acanthosis nigricans

## 2022-02-24 NOTE — ASSESSMENT
[FreeTextEntry1] : 64 y/o male with hx of TIA and MI along with type 2 diabetes, hyperlipidemia, and hypertension. \par \par Plan: \par Type 2 DM: A1C slightly improved \par - increase Metformin 500 mg twice a day, gradually increase dose to 2000 mg daily \par - continue Jardiance 25 mg daily \par - continue blood sugar monitoring to 3 times a day\par - call office with low or high blood sugars \par - educated on healthy food choices\par - increase routine exercise \par - repeat A1C before next visit \par \par Hyperlipidemia: cholesterol controlled, Triglycerides elevated - start omega - 3 \par - follow a low fat diet \par - continue statin \par - repeat lipids before next visit\par \par HTN: BP acceptable, current medication regimen \par \par RTO in 3 months with Dr. Light\par  [Importance of Diet and Exercise] : importance of diet and exercise to improve glycemic control, achieve weight loss and improve cardiovascular health

## 2022-02-24 NOTE — HISTORY OF PRESENT ILLNESS
[FreeTextEntry1] : Patient reports in 2013 he had TIA and PFO in [place] then was started on Metformin. Over the last couple of months he has become lethargic after eating breakfast with eye floaters then he has sugar followed by nausea and diarrhea. Has not tested his blood sugar during these episodes. \par \par Interval History: September had another cardiac stent placed for a total of 8 cardiac stents. In October had his 9th cardiac stent placed. He had COVID for 10 days, feeling better.   \par \par Quality: Type 2 DM\par Severity: moderate \par Duration: 2013 \par Onset: TIA \par Modifying Factors: Jayla with medication \par Associated Symptoms: denies polyuria or polydipsia \par Family History: Brother ans Sister with HTN \par \par Current Regimen:\par Jardiance 25 mg daily - increased urination however tolerable \par \par Self blood sugar monitoring: 3 times a week \par morning blood sugar average 156\par lunch blood sugar average 126\par evening meal average 153\par average glucose 145\par Current \par \par exercise: yard work, no routine exercise\par \par Diet:\par B- pancake with butter, cereal, fruit and egg with cheese on toast, 2% milk\par L- skips or cold cut sandwich, chips, unsweetened tea \par D- protein, salad, apple sauce, vegetables. \par bedtime snack - banana \par \par \par Date of last eye exam: 1/2021 (-) DR, dry eyes \par Date of last foot exam: fall with podiatry, mild neuropathy \par Date of last flu vaccine: 2020\par Date of last Pneumovax: March 2021\par COVID Vaccine: received \par \par PMH: \par HLD\par MI\par HTN \par TIA

## 2022-03-29 ENCOUNTER — RX RENEWAL (OUTPATIENT)
Age: 67
End: 2022-03-29

## 2022-04-10 ENCOUNTER — RX RENEWAL (OUTPATIENT)
Age: 67
End: 2022-04-10

## 2022-04-13 ENCOUNTER — RX RENEWAL (OUTPATIENT)
Age: 67
End: 2022-04-13

## 2022-05-17 ENCOUNTER — APPOINTMENT (OUTPATIENT)
Dept: NEUROLOGY | Facility: CLINIC | Age: 67
End: 2022-05-17

## 2022-05-17 ENCOUNTER — NON-APPOINTMENT (OUTPATIENT)
Age: 67
End: 2022-05-17

## 2022-05-24 LAB
HBA1C MFR BLD HPLC: 6.9
LDLC SERPL DIRECT ASSAY-MCNC: 79
MICROALBUMIN/CREAT 24H UR-RTO: 10

## 2022-05-25 ENCOUNTER — APPOINTMENT (OUTPATIENT)
Dept: ENDOCRINOLOGY | Facility: CLINIC | Age: 67
End: 2022-05-25
Payer: MEDICARE

## 2022-05-25 VITALS
SYSTOLIC BLOOD PRESSURE: 130 MMHG | BODY MASS INDEX: 33.04 KG/M2 | WEIGHT: 236 LBS | HEART RATE: 65 BPM | DIASTOLIC BLOOD PRESSURE: 80 MMHG | OXYGEN SATURATION: 97 % | HEIGHT: 71 IN

## 2022-05-25 DIAGNOSIS — I25.2 OLD MYOCARDIAL INFARCTION: ICD-10-CM

## 2022-05-25 LAB — GLUCOSE BLDC GLUCOMTR-MCNC: 116

## 2022-05-25 PROCEDURE — 82962 GLUCOSE BLOOD TEST: CPT

## 2022-05-25 PROCEDURE — 99215 OFFICE O/P EST HI 40 MIN: CPT | Mod: 25

## 2022-05-25 RX ORDER — BUDESONIDE 3 MG/1
3 CAPSULE, COATED PELLETS ORAL
Qty: 90 | Refills: 0 | Status: DISCONTINUED | COMMUNITY
Start: 2019-05-01 | End: 2022-05-25

## 2022-05-25 RX ORDER — ALBUTEROL SULFATE 90 UG/1
108 (90 BASE) INHALANT RESPIRATORY (INHALATION)
Qty: 1 | Refills: 5 | Status: DISCONTINUED | COMMUNITY
Start: 2021-02-11 | End: 2022-05-25

## 2022-05-25 RX ORDER — METFORMIN ER 500 MG 500 MG/1
500 TABLET ORAL
Qty: 180 | Refills: 0 | Status: DISCONTINUED | COMMUNITY
Start: 2022-02-24 | End: 2022-05-25

## 2022-05-25 NOTE — HISTORY OF PRESENT ILLNESS
[FreeTextEntry1] : Here for follow up of DM\par Was diagnosed with DM in 2013 when he was hospitalized with TIA.  Was found to have PFO at the time s/p closure. \par PMH also significant for CAD. \par \par Quality: Type 2 DM\par Severity: moderate\par Duration of diabetes:  since 2013\par Associated Complications/ Symptoms:  TIA, CAD s/p MI and PCI (total of 9 stents)\par Modifying Factors:  Better with medication\par \par SMBG:  testing 3 times a week \par \par Current Diabetic Medication Regimen:\par Metformin ER  1000 mg BID (complains of frequent diarrhea on this).  \par Jardiance 25 mg daily\par \par

## 2022-05-25 NOTE — ASSESSMENT
[FreeTextEntry1] : 66 year old male with PMH TIA and PFO s/p closure, CAD s/p MI and PCI, Type 2 DM, HTN and HLD.  His diabetes control has improved, but he complains of frequent diarrhea on metformin therapy.  \par \par 1.  T2DM-  due to GI intolerance, will D/C metformin and add Trulicity.  Start at 0.75 mg qweek for 2 weeks, then increase to 1.5 mg qweek.  Discussed the cardiac benefits of GLP-1 agonist therapy.  Patient was taught injection technique at this office visit.  Continue current dose of Jardiance.  Repeat labs in 3 months.\par 2.  HLD-  continue high dose rosuvastatin therapy.  \par 3.  HTN-  continue Losartan.\par \par Spent 40 minutes on this patient encounter with time spent reviewing records, examining and counseling patient and documenting.  \par \par Follow up with NP in 3 months and MD in 6 months.

## 2022-05-25 NOTE — REVIEW OF SYSTEMS
[Recent Weight Loss (___ Lbs)] : recent weight loss: [unfilled] lbs [Diarrhea] : diarrhea [Chest Pain] : no chest pain [Shortness Of Breath] : no shortness of breath

## 2022-06-03 ENCOUNTER — RX RENEWAL (OUTPATIENT)
Age: 67
End: 2022-06-03

## 2022-06-14 ENCOUNTER — RX RENEWAL (OUTPATIENT)
Age: 67
End: 2022-06-14

## 2022-06-17 ENCOUNTER — RX RENEWAL (OUTPATIENT)
Age: 67
End: 2022-06-17

## 2022-06-20 ENCOUNTER — RX RENEWAL (OUTPATIENT)
Age: 67
End: 2022-06-20

## 2022-06-20 RX ORDER — BLOOD SUGAR DIAGNOSTIC
STRIP MISCELLANEOUS
Qty: 200 | Refills: 1 | Status: ACTIVE | COMMUNITY
Start: 2021-08-04 | End: 1900-01-01

## 2022-06-29 ENCOUNTER — RX RENEWAL (OUTPATIENT)
Age: 67
End: 2022-06-29

## 2022-07-22 ENCOUNTER — NON-APPOINTMENT (OUTPATIENT)
Age: 67
End: 2022-07-22

## 2022-08-19 LAB
HBA1C MFR BLD HPLC: 6.4
LDLC SERPL DIRECT ASSAY-MCNC: 84

## 2022-08-22 ENCOUNTER — APPOINTMENT (OUTPATIENT)
Dept: ENDOCRINOLOGY | Facility: CLINIC | Age: 67
End: 2022-08-22

## 2022-08-22 VITALS
HEART RATE: 76 BPM | BODY MASS INDEX: 32.9 KG/M2 | HEIGHT: 71 IN | DIASTOLIC BLOOD PRESSURE: 80 MMHG | SYSTOLIC BLOOD PRESSURE: 128 MMHG | OXYGEN SATURATION: 98 % | WEIGHT: 235 LBS

## 2022-08-22 LAB — GLUCOSE BLDC GLUCOMTR-MCNC: 143

## 2022-08-22 PROCEDURE — 82962 GLUCOSE BLOOD TEST: CPT

## 2022-08-22 PROCEDURE — 99214 OFFICE O/P EST MOD 30 MIN: CPT | Mod: 25

## 2022-08-22 RX ORDER — MONTELUKAST 10 MG/1
10 TABLET, FILM COATED ORAL
Qty: 90 | Refills: 0 | Status: DISCONTINUED | COMMUNITY
Start: 2019-02-23 | End: 2022-08-22

## 2022-08-22 NOTE — REVIEW OF SYSTEMS
[Decreased Appetite] : decreased appetite [Recent Weight Loss (___ Lbs)] : recent weight loss: [unfilled] lbs [Fatigue] : no fatigue [Visual Field Defect] : no visual field defect [Blurred Vision] : no blurred vision [Dysphagia] : no dysphagia [Neck Pain] : no neck pain [Dysphonia] : no dysphonia [Chest Pain] : no chest pain [Palpitations] : no palpitations [Constipation] : no constipation [Diarrhea] : no diarrhea [Polyuria] : no polyuria [Dysuria] : no dysuria [Headaches] : no headaches [Tremors] : no tremors [Depression] : no depression [Anxiety] : no anxiety [Polydipsia] : no polydipsia [Swelling] : no swelling [FreeTextEntry2] : reports "too hot to eat" [FreeTextEntry3] : floaters in eyes, seen Sight MD [FreeTextEntry7] : irregular

## 2022-08-22 NOTE — ASSESSMENT
[FreeTextEntry1] : 66 year old male with PMH TIA and PFO s/p closure, CAD s/p MI and PCI, Type 2 DM, HTN and HLD.  His diabetes control has improved, but he complains of frequent diarrhea on metformin therapy.  \par \par 1.  T2DM-   A1c improved with Trulicity. Continue Trulicity 1.5 mg qweek and Jardiance. Discussed the cardiac benefits of GLP-1 agonist therapy.  Repeat labs in 3 months.\par \par 2.  HLD-  continue high dose rosuvastatin therapy.  Start Vascepa to lower Triglycerides. Repeat lipids in 3 months. \par \par 3.  HTN-  BP acceptable,continue Losartan.\par \par Follow up in 3 months with Dr. Light

## 2022-08-22 NOTE — PHYSICAL EXAM
[Alert] : alert [Well Nourished] : well nourished [No Acute Distress] : no acute distress [Well Developed] : well developed [Normal Sclera/Conjunctiva] : normal sclera/conjunctiva [No Proptosis] : no proptosis [Normal Hearing] : hearing was normal [No LAD] : no lymphadenopathy [Thyroid Not Enlarged] : the thyroid was not enlarged [No Thyroid Nodules] : no palpable thyroid nodules [No Accessory Muscle Use] : no accessory muscle use [Clear to Auscultation] : lungs were clear to auscultation bilaterally [Normal to Percussion] : lungs were normal to percussion [Normal S1, S2] : normal S1 and S2 [Normal Rate] : heart rate was normal [Regular Rhythm] : with a regular rhythm [No Edema] : no peripheral edema [Normal Bowel Sounds] : normal bowel sounds [Not Tender] : non-tender [Soft] : abdomen soft [No Stigmata of Cushings Syndrome] : no stigmata of Cushings Syndrome [Normal Gait] : normal gait [Acanthosis Nigricans] : no acanthosis nigricans [No Tremors] : no tremors [Oriented x3] : oriented to person, place, and time [Normal Affect] : the affect was normal [Normal Insight/Judgement] : insight and judgment were intact [Normal Mood] : the mood was normal

## 2022-08-22 NOTE — HISTORY OF PRESENT ILLNESS
[FreeTextEntry1] : Here for follow up of DM\par Was diagnosed with DM in 2013 when he was hospitalized with TIA.  Was found to have PFO at the time s/p closure. \par PMH also significant for CAD. \par \par Quality: Type 2 DM\par Severity: moderate\par Duration of diabetes:  since 2013\par Associated Complications/ Symptoms:  TIA, CAD s/p MI and PCI (total of 9 stents)\par Modifying Factors:  Better with medication\par \par SMBG:  testing 3 times a week, per patient recall blood sugars 120 \par Curren \par \par Current Diabetic Medication Regimen:\par Trulicity 1.5 mg weekly \par Jardiance 25 mg daily\par \par Metformin ER  1000 mg BID - stopped due to frequent diarrhea

## 2022-10-12 ENCOUNTER — NON-APPOINTMENT (OUTPATIENT)
Age: 67
End: 2022-10-12

## 2022-11-18 ENCOUNTER — NON-APPOINTMENT (OUTPATIENT)
Age: 67
End: 2022-11-18

## 2022-11-18 ENCOUNTER — APPOINTMENT (OUTPATIENT)
Dept: NEUROLOGY | Facility: CLINIC | Age: 67
End: 2022-11-18

## 2022-11-18 VITALS
BODY MASS INDEX: 32.9 KG/M2 | WEIGHT: 235 LBS | HEIGHT: 71 IN | DIASTOLIC BLOOD PRESSURE: 77 MMHG | SYSTOLIC BLOOD PRESSURE: 135 MMHG | HEART RATE: 80 BPM | TEMPERATURE: 97.8 F

## 2022-11-18 DIAGNOSIS — R42 DIZZINESS AND GIDDINESS: ICD-10-CM

## 2022-11-18 PROCEDURE — 99213 OFFICE O/P EST LOW 20 MIN: CPT

## 2022-11-18 RX ORDER — LIDOCAINE 5% 700 MG/1
5 PATCH TOPICAL
Qty: 30 | Refills: 3 | Status: ACTIVE | COMMUNITY
Start: 2022-11-18 | End: 1900-01-01

## 2022-11-18 NOTE — PHYSICAL EXAM
[FreeTextEntry1] : \par Examination:\par Constitutional: normal, no apparent distress\par Eyes: normal conjunctiva b/l, no ptosis, visual fields full\par Respiratory: no respiratory distress, normal effort, normal auscultation\par Cardiovascular: normal rate, rhythm, no murmurs\par Neck: supple, no masses\par Vascular: carotids normal\par Skin: normal color, no rashes\par Psych: normal mood, affect\par \par Neurological:\par Memory: oriented to person, place, time\par Language intact/no aphasia\par Cranial Nerves: II-XII intact, Pupils equally round and reactive to light, ocular muscles/movements intact, no ptosis, no facial weakness, tongue protrudes normally in the midline, \par Motor: normal tone, no pronator drift, full strength in all four extremities in the proximal and distal muscle groups\par Coordination: Fine motor movements intact, rapid alternating movements intact, finger to nose intact bilaterally\par Sensory: intact to light touch, JPS intact, slight decrease in vibration in left foot\par DTRs: 1+ in biceps, radialis, patellars, ankle jerks\par Gait: narrow based, steady\par . \par

## 2022-11-18 NOTE — HISTORY OF PRESENT ILLNESS
[FreeTextEntry1] : Last visit 11/30/21.\par He is currently using a very old CPAP machine. HIs current machine had a short circuit. It was sent back for repairs and returned to him from [facility] without a power cord.\par \par In September he fainted while apple picking. He was pulling a wagon with his grandchildren up a hill. He felt whoozy and lightheaded. He then thought that he tripped because he remembered falling. However, his family said that he collapsed. He says he was awake and alert as soon as he was on the ground. \par He did not go to the hospital. \par He has seen his PCP and cardiologist.\par He was told it was likely related to hypoglycemia or a blood pressure change.\par \par In October he was seen by his ophthalmologist who reported a change in his left pupil shape (from round to oval).\par He is seeing better with prism.\par An MRI brain was not yet done.\par \par He states that neuropathy is getting worse despite improvement in blood sugar.\par He has constant discomfort in the ball of his right foot.\par He had an MRI of the foot.\par He had a cortisone injection and did 6 weeks of therapy.\par He is beginning to have some tingling in his fingers.\par He takes gabapentin 600 mg bid.\par \par He had a cardiac cath earlier this week. No significant blockage was fine.\par \par \par \par \par

## 2022-11-18 NOTE — DATA REVIEWED
[de-identified] : MRI brain 4/25/16: minimal white matter microvascular disease\par \par MRI brain 2/28/19: No significant abnormality\par \par MRA brain 3/4/19: normal\par  [de-identified] : Neurotrax 5/24/16: Mild cognitive impairment. Cognitive domain score for verbal function is impaired\par \par Neuropsych evaluation January 2021: Mild cognitive disorder, Dysthymic disorder [de-identified] : \par Split Night Study 5/9/17: AHI 62.5 No PLMS\par Therapeutic portion: optimal pressure at 12 cm H2O\par \par blood tests 10/18/21: TSH 2.09, vitamin B12 592, vitamin D 31.5

## 2022-11-22 LAB
HBA1C MFR BLD HPLC: 6.5
LDLC SERPL DIRECT ASSAY-MCNC: 60
TSH SERPL-ACNC: 1.37

## 2022-11-23 ENCOUNTER — APPOINTMENT (OUTPATIENT)
Dept: ENDOCRINOLOGY | Facility: CLINIC | Age: 67
End: 2022-11-23

## 2022-11-23 VITALS
HEIGHT: 71 IN | DIASTOLIC BLOOD PRESSURE: 76 MMHG | HEART RATE: 75 BPM | SYSTOLIC BLOOD PRESSURE: 132 MMHG | OXYGEN SATURATION: 97 % | BODY MASS INDEX: 32.9 KG/M2 | WEIGHT: 235 LBS

## 2022-11-23 LAB — GLUCOSE BLDC GLUCOMTR-MCNC: 103

## 2022-11-23 PROCEDURE — 99214 OFFICE O/P EST MOD 30 MIN: CPT | Mod: 25

## 2022-11-23 PROCEDURE — 82962 GLUCOSE BLOOD TEST: CPT

## 2022-11-23 RX ORDER — FESOTERODINE FUMARATE 8 MG/1
8 TABLET, FILM COATED, EXTENDED RELEASE ORAL
Refills: 0 | Status: ACTIVE | COMMUNITY

## 2022-11-23 RX ORDER — PANTOPRAZOLE 40 MG/1
40 TABLET, DELAYED RELEASE ORAL
Refills: 0 | Status: ACTIVE | COMMUNITY

## 2022-11-23 RX ORDER — OMEPRAZOLE 20 MG/1
20 CAPSULE, DELAYED RELEASE ORAL
Qty: 30 | Refills: 0 | Status: DISCONTINUED | COMMUNITY
Start: 2019-05-01 | End: 2022-11-23

## 2022-11-23 NOTE — HISTORY OF PRESENT ILLNESS
[FreeTextEntry1] : Here for follow up of DM\par Was diagnosed with DM in 2013 when he was hospitalized with TIA.  Was found to have PFO at the time s/p closure. \par PMH also significant for CAD. \par \par Quality: Type 2 DM\par Severity: moderate\par Duration of diabetes:  since 2013\par Associated Complications/ Symptoms:  TIA, CAD s/p MI and PCI (total of 9 stents), neuropathy \par Modifying Factors:  Better with medication\par \par SMBG:  testing 3 times a week.  Most BG in the 100- 130 mg/dl range.   \par \par Current Diabetic Medication Regimen:\par Trulicity 1.5 mg qweek\par Jardiance 25 mg daily\par Stopped metformin due to diarrhea.  \par \par

## 2022-11-23 NOTE — ASSESSMENT
[FreeTextEntry1] : 67 year old male with PMH TIA and PFO s/p closure, CAD s/p MI and PCI, Type 2 DM, HTN and HLD.  His diabetes is well controlled.  \par \par 1.  T2DM-  Continue current dose of Trulicity and Jardiance.    \par 2.  HLD-  continue high dose rosuvastatin therapy.  \par 3.  HTN-  continue Losartan.\par \par Follow up with NP in 3 months and MD in 6 months.

## 2022-12-19 ENCOUNTER — RX RENEWAL (OUTPATIENT)
Age: 67
End: 2022-12-19

## 2023-02-21 LAB
HBA1C MFR BLD HPLC: 7.2
LDLC SERPL DIRECT ASSAY-MCNC: 43

## 2023-02-22 ENCOUNTER — APPOINTMENT (OUTPATIENT)
Dept: ENDOCRINOLOGY | Facility: CLINIC | Age: 68
End: 2023-02-22
Payer: MEDICARE

## 2023-02-22 VITALS
BODY MASS INDEX: 33.18 KG/M2 | WEIGHT: 237 LBS | HEIGHT: 71 IN | HEART RATE: 78 BPM | SYSTOLIC BLOOD PRESSURE: 136 MMHG | DIASTOLIC BLOOD PRESSURE: 78 MMHG | OXYGEN SATURATION: 96 %

## 2023-02-22 LAB — GLUCOSE BLDC GLUCOMTR-MCNC: 123

## 2023-02-22 PROCEDURE — 82962 GLUCOSE BLOOD TEST: CPT

## 2023-02-22 PROCEDURE — 99214 OFFICE O/P EST MOD 30 MIN: CPT | Mod: 25

## 2023-02-22 NOTE — ASSESSMENT
[FreeTextEntry1] : 67 year old male with PMH TIA and PFO s/p closure, CAD s/p MI and PCI, Type 2 DM, HTN and HLD. His diabetes is well controlled. \par \par 1. T2DM- Continue current dose of Trulicity and Jardiance. Stop drinking regular soda and switch to whole wheat bread. \par 2. HLD- continue high dose rosuvastatin and fish oil therapy (gets over the counter)\par 3. HTN- continue Losartan.\par 4. Need updated vit d levels\par 5. Need updated vit b levels\par \par Will use [facility] for a spinal/ortho referall due to neuropathy that im not confident is from diabetes due to tight dm control\par \par Follow up with MD in 3 months, labs before next visit

## 2023-02-22 NOTE — HISTORY OF PRESENT ILLNESS
[FreeTextEntry1] : Here for follow up of DM\par Was diagnosed with DM in 2013 when he was hospitalized with TIA. Was found to have PFO at the time s/p closure. \par PMH also significant for CAD. \par \par Pt had 2 rounds of steroid injections into his shoulder in both Dec, Jan which could be to blame for the HGA1C increase. \par \par C/O numbness/tingling in feet and hands- does have bulging discs in back\par \par Quality: Type 2 DM\par Severity: moderate\par Duration of diabetes: since 2013\par Associated Complications/ Symptoms: TIA, CAD s/p MI and PCI (total of 9 stents), neuropathy \par Modifying Factors: Better with medication\par \par SMBG: testing 3 times a week. Most BG in the 100- 130 mg/dl range. \par FS in office 123\par \par Current Diabetic Medication Regimen:\par Trulicity 1.5 mg qweek\par Jardiance 25 mg daily\par Stopped metformin due to diarrhea. \par \par Admits to having regular soda and chocolate and white bread daily. \par \par

## 2023-02-22 NOTE — REVIEW OF SYSTEMS
[Polyuria] : polyuria [Chest Pain] : no chest pain [Shortness Of Breath] : no shortness of breath [Constipation] : no constipation [Diarrhea] : no diarrhea

## 2023-03-03 ENCOUNTER — APPOINTMENT (OUTPATIENT)
Dept: ORTHOPEDIC SURGERY | Facility: CLINIC | Age: 68
End: 2023-03-03

## 2023-03-09 ENCOUNTER — APPOINTMENT (OUTPATIENT)
Dept: ORTHOPEDIC SURGERY | Facility: CLINIC | Age: 68
End: 2023-03-09
Payer: MEDICARE

## 2023-03-09 VITALS
SYSTOLIC BLOOD PRESSURE: 118 MMHG | HEART RATE: 73 BPM | HEIGHT: 71 IN | BODY MASS INDEX: 32.9 KG/M2 | WEIGHT: 235 LBS | DIASTOLIC BLOOD PRESSURE: 72 MMHG

## 2023-03-09 DIAGNOSIS — M43.16 SPONDYLOLISTHESIS, LUMBAR REGION: ICD-10-CM

## 2023-03-09 DIAGNOSIS — M47.816 SPONDYLOSIS W/OUT MYELOPATHY OR RADICULOPATHY, LUMBAR REGION: ICD-10-CM

## 2023-03-09 PROCEDURE — 99204 OFFICE O/P NEW MOD 45 MIN: CPT

## 2023-03-09 NOTE — HISTORY OF PRESENT ILLNESS
[de-identified] : 67-year-old male is here for low back pain bilateral leg weakness which is worsened upon standing and walking.  Pain of the legs is described as heaviness and weakness and is made better somewhat by flexing forward or sitting.  Pain in the back is worsened with extension and flexion of the lumbar spine.  It is also worsened with bending and lifting.  Low back pain is described as a dull ache.  Pain rates 8 out of 10 at worst, 4 out of 10 at best.  He is currently under the care of physical therapy and pain management.  He had his last pain management injection with Dr. Dash To 6 weeks ago it lasted for only 1 day.  He has had multiple trigger point epidural injections none of which is working lately.  Physical therapy 2-3 times weekly for the last 6 months is also not helping whatsoever to relieve his pain in fact his symptoms are worsening.  Past medical surgical's social allergy and family history were reviewed.  Patient is non-smoker.  He does have diabetes and hypertension.\par Note, he reports being off balance, having frequent falls, having intermittent weakness of the arms, decreased dexterity intermittently as well.

## 2023-03-09 NOTE — PHYSICAL EXAM
[de-identified] : CONSTITUTIONAL: The patient is a very pleasant individual who is well-nourished and who appears stated age.\par PSYCHIATRIC: The patient is alert and oriented X 3 and in no apparent distress, and participates with orthopedic evaluation well.\par HEAD: Atraumatic and is nonsyndromic in appearance.\par EENT: No visible thyromegaly, EOMI.\par RESPIRATORY: Respiratory rate is regular, not dyspneic on examination.\par LYMPHATICS: There is no inguinal lymphadenopathy\par INTEGUMENTARY: Skin is clean, dry, and intact about the bilateral lower extremities and lumbar spine.\par VASCULAR: There is brisk capillary refill about the bilateral lower extremities.\par NEUROLOGIC: There are no pathologic reflexes. There is no decrease in sensation of the bilateral lower extremities on Wartenberg pinwheel examination. Deep tendon reflexes are well maintained at 2+/4 of the bilateral lower extremities and are symmetric..  There is neurogenic claudication type symptoms upon standing in the office greater than 2 minutes at a time pain weakness and burning down the legs.\par MUSCULOSKELETAL: There is no visible muscular atrophy. Manual motor strength is well maintained in the bilateral lower extremities. Range of motion of lumbar spine is well maintained. The patient ambulates in a non-myelopathic manner. Negative tension sign and straight leg raise bilaterally. Quad extension, ankle dorsiflexion, EHL, plantar flexion, and ankle eversion are well preserved. Normal secondary orthopaedic exam of bilateral hips, greater trochanteric area, knees and ankles exam as highlighted by mechanical low back pain on flexion and extension.  He has an antalgic gait and inability to bend without severe pain across the back. [de-identified] : X-rays of the lumbar spine AP lateral done at [facility] radiology demonstrates spondylolisthesis L3-L4, degenerative disc disease and facet hypertrophy L5-S1.  Lumbar lordosis is well-maintained

## 2023-03-09 NOTE — DISCUSSION/SUMMARY
[Surgical risks reviewed] : Surgical risks reviewed [de-identified] : 45 minutes was spent reviewing the x-rays as well as discussing with the patient their clinical presentation, diagnosis and providing education.  Conservative treatment was discussed with the patient at length. Anticipatory guidance regarding disease process probable lumbar stenosis with neurogenic claudication, spondylolisthesis L3-L4, degenerative disc disease, avoidance of acute exacerbation this was discussed at length and all patients commenting concerns were answered to the patient's satisfaction. Physical therapy for decrease pain and increase function was ordered to continue. Patient was given home exercises as approved by [employer] and works well held directed toward this particular process. Intermittent use of acetaminophen 500 mg 2 tablets t.i.d. p.r.n. mild to moderate pain, ibuprofen 600 mg t.i.d. p.r.n. severe pain with food or milk if there is no medical contraindication. Home exercise including stretching on a daily basis for 20-30 minutes was recommended. Heat, ice, topical were discussed as needed.  MRI of the lumbar spine as well as CT scan of the lumbar spine is ordered is medically necessary for surgical planning probable lumbar laminectomy with plus or minus fusion.\par Due to ataxia, intermittent falls, intermittent weakness of the upper extremities and history of lumbar stenosis lumbar spondylosis, cervical MRI is ordered is medically necessary to rule out possible myelopathy which may need decompression prior to addressing his lumbar spinal issues.\par A long discussion was had with the patient regarding Lumbar surgical plan of possible lumbar laminectomy and fusion. Anatomic models, Xrays, CT scans/MRI's were utilized to provide a firm understanding of their surgical plan. Patient is aware that surgery is elective in nature and he/she choosing to proceed with surgery. Risks, benefits, alternatives were discussed and all questions, comments and concerns were encouraged and answered to the patient's satisfaction. The statistical probability of improvement was discussed at length as well as post surgical course. Literature from [employer] society was provided to the patient regarding the specific type of surgery as well as a 5 page written surgical consent which the patient will need to sign and return to the office prior to surgical date. Consent forms highlight specific complications related to the complex nature of spinal surgery\par  \par Risks of lumbar surgery include: persistent pain, adjacent segment disease (which will require more surgery in future), dural tears, neurologic injury, and wound healing complications\par  \par Benefits of lumbar surgery include Improved neurologic and pain score\par  \par We also discussed with the patient complications of incisions directly related to obesity, diabetes, previous wound complications or post-surgical wound infections, smoking, neuropathy, and chronic anticoagulation. This risk has been specifically discussed and the patient will discuss modifiable risk factors to be optimized prior to surgical management. A multimodality approach of primary care physician, and medicine subspecialist will be utilized to optimize medical risk factors.\par  \par If patient is a smoker, discontinuation of smoking was advised and must be accomplished 6-8 weeks prior to surgery date. Patient was advised that help with quitting smoking is available through [place] [employer]oker[employer]uit Line and phone number/website was provided, or patient can ask assistance from primary care provider. Elective surgery will not be performed unless patient complies with this policy.

## 2023-04-04 ENCOUNTER — APPOINTMENT (OUTPATIENT)
Dept: ORTHOPEDIC SURGERY | Facility: CLINIC | Age: 68
End: 2023-04-04
Payer: MEDICARE

## 2023-04-04 VITALS
DIASTOLIC BLOOD PRESSURE: 79 MMHG | BODY MASS INDEX: 32.9 KG/M2 | HEART RATE: 76 BPM | HEIGHT: 71 IN | SYSTOLIC BLOOD PRESSURE: 154 MMHG | WEIGHT: 235 LBS

## 2023-04-04 DIAGNOSIS — M48.062 SPINAL STENOSIS, LUMBAR REGION WITH NEUROGENIC CLAUDICATION: ICD-10-CM

## 2023-04-04 DIAGNOSIS — M47.812 SPONDYLOSIS W/OUT MYELOPATHY OR RADICULOPATHY, CERVICAL REGION: ICD-10-CM

## 2023-04-04 DIAGNOSIS — M54.12 DISEASE OF SPINAL CORD, UNSPECIFIED: ICD-10-CM

## 2023-04-04 DIAGNOSIS — G95.9 DISEASE OF SPINAL CORD, UNSPECIFIED: ICD-10-CM

## 2023-04-04 PROCEDURE — 99214 OFFICE O/P EST MOD 30 MIN: CPT

## 2023-04-04 PROCEDURE — 72040 X-RAY EXAM NECK SPINE 2-3 VW: CPT

## 2023-04-04 NOTE — PHYSICAL EXAM
[Antalgic] : antalgic [Wide-Based] : wide-based [Stooped] : stooped [de-identified] : CONSTITUTIONAL:  Patient is a very pleasant individual who is well-nourished and appears stated age. \par PSYCHIATRIC:  Alert and oriented times three and in no apparent distress, and participates with orthopedic evaluation well.\par HEAD:  Atraumatic and  nonsyndromic in appearance.\par EENT: No thyromegaly, EOMI.\par RESPIRATORY:  Respiratory rate is regular, not dyspneic on examination.\par LYMPHATICS:  There is no cervical or axillary lymphadenopathy.\par INTEGUMENTARY:  Skin is clean, dry, and intact about the bilateral upper extremities and cervical spine. \par VASCULAR:   There is brisk capillary refill about the bilateral upper extremities and radial pulses are 2/4. \par NEUROLOGIC: Positive L'hirmitte, positive Spurling's sign. There are  pathologic reflexes lower extremity hyperreflexia and a subtle Jaclyn sign. There is a diffuse decrease in sensation of the bilateral upper extremities on Wartenberg pinwheel examination.  Deep tendon reflexes are well-maintained at +2/4 of the bilateral upper extremities and are symmetric.\par MUSCULOSKELETAL:  There is no visible muscular atrophy.  Manual motor strength is well maintained in the bilateral upper extremities.  Cervical range of motion is well maintained.  The patient ambulates in a myelopathic manner. Normal secondary orthopaedic exam of bilateral shoulders, elbows and hands.  Elbow flexion and extension, wrist extension, finger flexion and abduction are well maintained.  In addition to cervical myelopathy type clinical exam findings patient is also suffering from neurogenic claudication\par \par   [de-identified] : MRIs been reviewed from [facility] dated March 16, 2023.  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  3 views cervical spine obtained on today's date of April 4, 2023.\par \par Lumbar x-ray from previous has been reviewed from March 9 that shows lumbar degenerative disc disease mild retrolisthesis of L3 in relation to L4.

## 2023-04-04 NOTE — DISCUSSION/SUMMARY
[de-identified] : Based upon cervical MRI findings with severe cervical spondylosis at 5 6 and 6 7 with moderate to severe spinal stenosis at 5 6 and a AROLDO questionnaire that is grossly positive I would recommend taking care of his cervical spine with a planned ACDF at C5-C6 and C6-C7 first.  I be fearful for placing him prone for a prolonged period of time for a multiple level lumbar laminectomy at L5 3 L4 and L5.  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  We have also discussed being out of the water return to driving return to work as mentioned.  Dysphagia dysphonia have been discussed adjacent segment disease both in cervical and lumbar cases have been discussed.  In summary reviewed discussed an ACDF at C5-C6 and C6-C7 and in the lumbar spine to be a multilevel lumbar laminectomy at L3-L4 and L5 I would anticipate an incomplete resolution of neurogenic claudication because I would not incorporate interbody cages at multiple levels but he does have foraminal narrowing we will have to address the foraminal component as best we can from a posterior laminectomy approach.  Patient wishes to go home talk to his family and I would do the cervical spine first based upon a progressive worsening of his RAOLDO questionnaire and the severe cervical stenosis.

## 2023-04-04 NOTE — HISTORY OF PRESENT ILLNESS
[de-identified] : Remi is a very pleasant 67-year-old gentleman who presents in follow-up to cervical MRIs lumbar MRIs.  On her last encounter his AROLDO questionnaire is grossly positive this was repeated on today's exam and again it is grossly positive.  Patient states he has lost dexterity he is off balance he states when throwing baseballs to his grandson he definitely feels off balance has had multiple close encounters with falling.  In summary grossly positive AROLDO questionnaire.  Presents for surgical conversation with regard to cervical and lumbar findings. [Worsening] : worsening [10] : a maximum pain level of 10/10 [Constant] : ~He/She~ states the symptoms seem to be constant [Walking] : worsened by walking [Rest] : relieved by rest [Ataxia] : no ataxia [Incontinence] : no incontinence [Loss of Dexterity] : good dexterity [Urinary Ret.] : no urinary retention

## 2023-06-01 ENCOUNTER — RX RENEWAL (OUTPATIENT)
Age: 68
End: 2023-06-01

## 2023-06-12 ENCOUNTER — APPOINTMENT (OUTPATIENT)
Dept: ENDOCRINOLOGY | Facility: CLINIC | Age: 68
End: 2023-06-12
Payer: MEDICARE

## 2023-06-12 VITALS
HEART RATE: 75 BPM | SYSTOLIC BLOOD PRESSURE: 130 MMHG | WEIGHT: 229 LBS | BODY MASS INDEX: 32.06 KG/M2 | OXYGEN SATURATION: 97 % | HEIGHT: 71 IN | DIASTOLIC BLOOD PRESSURE: 82 MMHG

## 2023-06-12 DIAGNOSIS — Z87.19 PERSONAL HISTORY OF OTHER DISEASES OF THE DIGESTIVE SYSTEM: ICD-10-CM

## 2023-06-12 LAB — GLUCOSE BLDC GLUCOMTR-MCNC: 118

## 2023-06-12 PROCEDURE — 99214 OFFICE O/P EST MOD 30 MIN: CPT | Mod: 25

## 2023-06-12 PROCEDURE — 82962 GLUCOSE BLOOD TEST: CPT

## 2023-06-12 RX ORDER — EZETIMIBE 10 MG/1
10 TABLET ORAL
Refills: 0 | Status: ACTIVE | COMMUNITY

## 2023-06-12 NOTE — ASSESSMENT
[FreeTextEntry1] : 67 year old male with PMH TIA and PFO s/p closure, CAD s/p MI and PCI, Type 2 DM, HTN and HLD here for follow up.  His diabetes control is improving.   \par \par 1.  T2DM-  Continue current dose of Trulicity and Jardiance.    Ok to proceed with srugery from endocrine perspective.  Hold Jardiance 3 days before surgery to reduce the risk of euglycemic DKA.    \par 2.  HLD-  continue high dose rosuvastatin therapy.  \par 3.  HTN-  continue Losartan.\par \par Follow up with NP in 3 months and MD in 6 months.

## 2023-06-12 NOTE — HISTORY OF PRESENT ILLNESS
[FreeTextEntry1] : Follow up of DM, HLD\par Was diagnosed with DM in 2013 when he was hospitalized with TIA.  Was found to have PFO at the time s/p closure. \par PMH also significant for CAD. \par \par Quality: Type 2 DM\par Severity: moderate\par Duration of diabetes:  since 2013\par Associated Complications/ Symptoms:  TIA, CAD s/p MI and PCI (total of 9 stents), neuropathy \par Modifying Factors:  Better with medication\par \par SMBG:  testing 3 times a week.   \par \par Current Diabetic Medication Regimen:\par Trulicity 1.5 mg qweek\par Jardiance 25 mg daily\par Stopped metformin due to diarrhea.  \par \par Will need cervical spine surgery soon due to radiculopathy.  Will be having surgery at [facility].  \par \par

## 2023-06-13 NOTE — PHYSICAL EXAM
[de-identified] : Progress Note completed by Kath Rebolledo PA-C\par * Dr. Mckenzie -- The documentation recorded in this note accurately reflects the decisions made by me during this visit.  [No Acute Distress] : no acute distress [Normal Oropharynx] : normal oropharynx [IV] : Mallampati Class: IV [Normal Appearance] : normal appearance [Neck Circumference: ___] : neck circumference: [unfilled] [No Neck Mass] : no neck mass [Normal Rate/Rhythm] : normal rate/rhythm [Normal S1, S2] : normal s1, s2 [No Murmurs] : no murmurs [No Resp Distress] : no resp distress [Clear to Auscultation Bilaterally] : clear to auscultation bilaterally [No Abnormalities] : no abnormalities [Benign] : benign [Normal Gait] : normal gait [No Clubbing] : no clubbing [No Cyanosis] : no cyanosis [No Edema] : no edema [FROM] : FROM [Normal Color/ Pigmentation] : normal color/ pigmentation [No Focal Deficits] : no focal deficits [Oriented x3] : oriented x3 [Normal Affect] : normal affect [TextBox_2] : Obese

## 2023-08-08 ENCOUNTER — APPOINTMENT (OUTPATIENT)
Dept: NEUROLOGY | Facility: CLINIC | Age: 68
End: 2023-08-08
Payer: MEDICARE

## 2023-08-08 VITALS
HEART RATE: 84 BPM | SYSTOLIC BLOOD PRESSURE: 130 MMHG | WEIGHT: 230 LBS | TEMPERATURE: 98.2 F | HEIGHT: 71 IN | DIASTOLIC BLOOD PRESSURE: 80 MMHG | BODY MASS INDEX: 32.2 KG/M2

## 2023-08-08 DIAGNOSIS — M54.50 LOW BACK PAIN, UNSPECIFIED: ICD-10-CM

## 2023-08-08 PROCEDURE — 99213 OFFICE O/P EST LOW 20 MIN: CPT

## 2023-08-08 NOTE — PHYSICAL EXAM
[FreeTextEntry1] : Examination: Constitutional: normal, no apparent distress Eyes: normal conjunctiva b/l, no ptosis, visual fields full Respiratory: no respiratory distress, normal effort, normal auscultation Cardiovascular: normal rate, rhythm, no murmurs Neck: supple, no masses Vascular: carotids normal Skin: normal color, no rashes Psych: normal mood, affect  Neurological: Memory: Oriented to person, place, time. Recalled 2/3 words after 3 minutes Language intact/no aphasia Cranial Nerves: II-XII intact, Pupils equally round and reactive to light, ocular muscles/movements intact, no ptosis, no facial weakness, tongue protrudes normally in the midline,  Motor: normal tone, no pronator drift, full strength in all four extremities in the proximal and distal muscle groups Coordination: Fine motor movements intact, rapid alternating movements intact, finger to nose intact bilaterally Sensory: intact to light touch DTRs: hypoactive Gait: using cane, slow

## 2023-08-08 NOTE — DISCUSSION/SUMMARY
[FreeTextEntry1] :     Mr. Moore is a 67 year old man with multiple medical problems including obstructive sleep apnea, neuropathy, mild cognitive impairment. He now has vertigo as well as worsening of mood.  1. Obstructive sleep apnea - Previous polysomnogram showed evidence of severe MILLICENT. He has been compliant with CPAP.  He has benefit from CPAP use and will continue to use it on a nightly basis.     2. Mild cognitive impairment - Neuropsych testing in the past has showed mild cognitive impairment. He feels that his memory is getting worse. We discussed medication options.  He has diarrhea and does not want to try Aricept. Trial of Memantine. Start 5 mg/day x 1 week, then 5 mg bid x 1 week, then 10 mg and 5 mg x 1 week and then 10 mg BID. Continue to participate in physically and mentally stimulating activities. Dysthymic disorder also may be contributing. Continue nightly use of CPAP.    Neuropathy TSH, B12 normal HbA1C is elevated Work on improving blood sugar Continue gabapentin 300 mg four times per day.    4. Vision change/pupil shape change Vision is improved and pupil appears round. He never had MRI brain. will hold off for now.   5. Depression, dysthymic disorder. Continue citalopram 30 mg/day  6. f/u at [facility] for neck pain, back pain.  f/u 6 months.

## 2023-08-08 NOTE — HISTORY OF PRESENT ILLNESS
[FreeTextEntry1] : Last visit 11/18/22. He is here with his wife.  He had back surgery on 7/28/23 at [facility]. He was supposed to have neck surgery but they decided to do his back first. The operation was at L4/L5 and L5/S1. His back pain has been relieved.  He was told that cervical spine disease is causing his arms and legs to go numb. He had nerve conduction studies. He says that he was told that his symptoms were mostly related to his cervical spine and not neuropathy. He is taking gabapentin 300 mg four times per day.  He did get a new CPAP machine. He has a ResMed machine.  His wife does not notice snoring when he uses it.  He uses a nasal pillow mask. Sometimes his wife notes leakage of air when he uses the nasal pillow.  His average usage is 8 hours 10 minutes. Average AHI is 1.  He thinks that his memory is getting worse. He may be working in the yard and forget what he was going to the shed to get. He says that mood is fine.   Vision difficulties have resolved.

## 2023-08-14 ENCOUNTER — RX RENEWAL (OUTPATIENT)
Age: 68
End: 2023-08-14

## 2023-08-14 ENCOUNTER — APPOINTMENT (OUTPATIENT)
Dept: NEUROLOGY | Facility: CLINIC | Age: 68
End: 2023-08-14
Payer: MEDICARE

## 2023-08-30 ENCOUNTER — RX RENEWAL (OUTPATIENT)
Age: 68
End: 2023-08-30

## 2023-09-07 ENCOUNTER — APPOINTMENT (OUTPATIENT)
Dept: ENDOCRINOLOGY | Facility: CLINIC | Age: 68
End: 2023-09-07
Payer: MEDICARE

## 2023-09-07 VITALS
HEART RATE: 84 BPM | WEIGHT: 235 LBS | BODY MASS INDEX: 32.9 KG/M2 | HEIGHT: 71 IN | DIASTOLIC BLOOD PRESSURE: 68 MMHG | OXYGEN SATURATION: 97 % | SYSTOLIC BLOOD PRESSURE: 120 MMHG

## 2023-09-07 LAB — GLUCOSE BLDC GLUCOMTR-MCNC: 140

## 2023-09-07 PROCEDURE — 82962 GLUCOSE BLOOD TEST: CPT

## 2023-09-07 PROCEDURE — 99214 OFFICE O/P EST MOD 30 MIN: CPT | Mod: 25

## 2023-09-07 RX ORDER — ICOSAPENT ETHYL 1 G/1
1 CAPSULE ORAL
Qty: 360 | Refills: 0 | Status: DISCONTINUED | COMMUNITY
Start: 2022-08-22 | End: 2023-09-07

## 2023-09-07 RX ORDER — MONTELUKAST SODIUM 10 MG/1
10 TABLET, FILM COATED ORAL
Refills: 0 | Status: DISCONTINUED | COMMUNITY
End: 2023-09-07

## 2023-09-07 RX ORDER — METHYLPREDNISOLONE 4 MG/1
4 TABLET ORAL
Qty: 1 | Refills: 0 | Status: DISCONTINUED | COMMUNITY
Start: 2023-03-09 | End: 2023-09-07

## 2023-09-07 RX ORDER — VIBEGRON 75 MG/1
75 TABLET, FILM COATED ORAL DAILY
Refills: 0 | Status: ACTIVE | COMMUNITY

## 2023-09-07 NOTE — ASSESSMENT
[FreeTextEntry1] : 67 year old male with PMH TIA and PFO s/p closure, CAD s/p MI and PCI, Type 2 DM, HTN and HLD. His diabetes is well controlled.   1. T2DM- Continue current dose of Trulicity and Jardiance. Doing well!  2. HLD- continue high dose rosuvastatin and fish oil therapy (gets over the counter) 3. HTN- continue Losartan. 4. Need updated vit d levels 5. Need updated vit b levels  Follow up with MD in 3 months, labs before next visit

## 2023-09-07 NOTE — HISTORY OF PRESENT ILLNESS
[FreeTextEntry1] : Interval history: Had lumbar spine surgery July 2023.   Here for follow up of DM Was diagnosed with DM in 2013 when he was hospitalized with TIA. Was found to have PFO at the time s/p closure.  PMH also significant for CAD.   Quality: Type 2 DM Severity: moderate Duration of diabetes: since 2013 Associated Complications/ Symptoms: TIA, CAD s/p MI and PCI (total of 9 stents), neuropathy  Modifying Factors: Better with medication  SMBG:Not checking sugars FS in office 140  Current Diabetic Medication Regimen: Trulicity 1.5 mg qweek-Sunday Jardiance 25 mg daily Stopped metformin due to diarrhea.   Endorses cutting back on soda and bread!   HGA1C 6.7- 8/2023- improved!

## 2023-12-20 ENCOUNTER — APPOINTMENT (OUTPATIENT)
Dept: ENDOCRINOLOGY | Facility: CLINIC | Age: 68
End: 2023-12-20
Payer: MEDICARE

## 2023-12-20 VITALS
HEART RATE: 80 BPM | RESPIRATION RATE: 16 BRPM | HEIGHT: 71 IN | DIASTOLIC BLOOD PRESSURE: 70 MMHG | BODY MASS INDEX: 33.18 KG/M2 | OXYGEN SATURATION: 98 % | WEIGHT: 237 LBS | TEMPERATURE: 98 F | SYSTOLIC BLOOD PRESSURE: 120 MMHG

## 2023-12-20 LAB — GLUCOSE BLDC GLUCOMTR-MCNC: 126

## 2023-12-20 PROCEDURE — 99214 OFFICE O/P EST MOD 30 MIN: CPT | Mod: 25

## 2023-12-20 PROCEDURE — 82962 GLUCOSE BLOOD TEST: CPT

## 2023-12-20 RX ORDER — CLOPIDOGREL BISULFATE 75 MG/1
75 TABLET, FILM COATED ORAL
Qty: 90 | Refills: 1 | Status: DISCONTINUED | COMMUNITY
Start: 2020-10-28 | End: 2023-12-20

## 2023-12-20 RX ORDER — DULAGLUTIDE 1.5 MG/.5ML
1.5 INJECTION, SOLUTION SUBCUTANEOUS
Qty: 3 | Refills: 1 | Status: DISCONTINUED | COMMUNITY
Start: 2022-05-25 | End: 2023-12-20

## 2023-12-20 NOTE — ASSESSMENT
[FreeTextEntry1] : 68 year old male with PMH TIA and PFO s/p closure, CAD s/p MI and PCI, Type 2 DM, HTN and HLD here for follow up.  His DM control has worsened.   1. T2DM-   Change Trulicity to MOunjaro 5 mg qweek for better efficacy.  Continue current dose of Jardiance.  Repeat A1c in 3 months. 2. HLD- continue high dose rosuvastatin and Ezetimibe.  3. HTN- continue Losartan.   Follow up with NP in 3 months and MD in 6 months.

## 2023-12-20 NOTE — DATA REVIEWED
[FreeTextEntry1] : LABS: 12/15/2023: A1c 7.2% UACR 4 LDL 76 TSH 1.23  5/11/2023: LDL 60 UACR 5 A1c 6.9% TSH 1.49

## 2023-12-20 NOTE — HISTORY OF PRESENT ILLNESS
[FreeTextEntry1] : Follow up of DM, RACHEAL Was diagnosed with DM in 2013 when he was hospitalized with TIA.  Was found to have PFO at the time s/p closure.  PMH also significant for CAD.   Quality: Type 2 DM Severity: moderate Duration of diabetes:  since 2013 Associated Complications/ Symptoms:  TIA, CAD s/p MI and PCI (total of 9 stents), neuropathy  Modifying Factors:  Better with medication  SMBG:  has been lax with testing lately.     Current Diabetic Medication Regimen: Trulicity 1.5 mg qweek Jardiance 25 mg daily Stopped metformin due to diarrhea.    Has been active walking.

## 2024-01-04 ENCOUNTER — RX RENEWAL (OUTPATIENT)
Age: 69
End: 2024-01-04

## 2024-01-04 RX ORDER — ROSUVASTATIN CALCIUM 40 MG/1
40 TABLET, FILM COATED ORAL
Qty: 90 | Refills: 3 | Status: ACTIVE | COMMUNITY
Start: 2019-01-29 | End: 1900-01-01

## 2024-02-26 ENCOUNTER — RX RENEWAL (OUTPATIENT)
Age: 69
End: 2024-02-26

## 2024-03-11 ENCOUNTER — APPOINTMENT (OUTPATIENT)
Dept: ENDOCRINOLOGY | Facility: CLINIC | Age: 69
End: 2024-03-11
Payer: MEDICARE

## 2024-03-11 VITALS
DIASTOLIC BLOOD PRESSURE: 80 MMHG | BODY MASS INDEX: 33.18 KG/M2 | HEIGHT: 71 IN | SYSTOLIC BLOOD PRESSURE: 125 MMHG | OXYGEN SATURATION: 98 % | WEIGHT: 237 LBS | HEART RATE: 78 BPM

## 2024-03-11 DIAGNOSIS — I10 ESSENTIAL (PRIMARY) HYPERTENSION: ICD-10-CM

## 2024-03-11 DIAGNOSIS — E78.00 PURE HYPERCHOLESTEROLEMIA, UNSPECIFIED: ICD-10-CM

## 2024-03-11 DIAGNOSIS — E53.8 DEFICIENCY OF OTHER SPECIFIED B GROUP VITAMINS: ICD-10-CM

## 2024-03-11 DIAGNOSIS — E11.9 TYPE 2 DIABETES MELLITUS W/OUT COMPLICATIONS: ICD-10-CM

## 2024-03-11 DIAGNOSIS — E55.9 VITAMIN D DEFICIENCY, UNSPECIFIED: ICD-10-CM

## 2024-03-11 LAB — GLUCOSE BLDC GLUCOMTR-MCNC: 136

## 2024-03-11 PROCEDURE — G2211 COMPLEX E/M VISIT ADD ON: CPT

## 2024-03-11 PROCEDURE — 82962 GLUCOSE BLOOD TEST: CPT

## 2024-03-11 PROCEDURE — 99214 OFFICE O/P EST MOD 30 MIN: CPT

## 2024-03-11 RX ORDER — TIRZEPATIDE 5 MG/.5ML
5 INJECTION, SOLUTION SUBCUTANEOUS
Qty: 3 | Refills: 1 | Status: ACTIVE | COMMUNITY
Start: 2023-12-20 | End: 1900-01-01

## 2024-03-11 RX ORDER — EMPAGLIFLOZIN 25 MG/1
25 TABLET, FILM COATED ORAL
Qty: 90 | Refills: 1 | Status: ACTIVE | COMMUNITY
Start: 2021-10-27 | End: 1900-01-01

## 2024-03-11 NOTE — HISTORY OF PRESENT ILLNESS
[FreeTextEntry1] : Interval history: Now on mounjaro - no signicant weight loss but diet hasnt changed much Endorses more ankle swelling then usual on Mounjaro (his socks leave an imprint) Admits he has a lot of leg boils that he pops, takes a while to heal but no open wounds. Follows with dermatology  Here for follow up of DM Was diagnosed with DM in 2013 when he was hospitalized with TIA. Was found to have PFO at the time s/p closure.  PMH also significant for CAD.   Quality: Type 2 DM Severity: moderate Duration of diabetes: since 2013 Associated Complications/ Symptoms: TIA, CAD s/p MI and PCI (total of 9 stents), neuropathy  Modifying Factors: Better with medication  SMBG:Not checking sugars FS in office 136  Current Diabetic Medication Regimen: Mounjaro 5 mg weekly- thursday Jardiance 25 mg daily Stopped metformin due to diarrhea.   HGA1C 7.1- 3/2024- worse  On gabapentin 300 mg 2 tabs TID 2/2 neuropathy Has balls of feet pain Feels like bugs are crawling on legs for 30 seconds every night Follows with neurology (also has right side of mouth numbness that began after a tooth was pulled 8/2023)

## 2024-03-11 NOTE — REVIEW OF SYSTEMS
[Fatigue] : no fatigue [Recent Weight Gain (___ Lbs)] : no recent weight gain [Recent Weight Loss (___ Lbs)] : no recent weight loss [Visual Field Defect] : no visual field defect [Blurred Vision] : no blurred vision [Dysphagia] : no dysphagia [Neck Pain] : no neck pain [Dysphonia] : no dysphonia [Chest Pain] : no chest pain [Shortness Of Breath] : no shortness of breath [Constipation] : no constipation [Diarrhea] : no diarrhea [Polyuria] : no polyuria [Pain/Numbness of Digits] : pain/numbness of digits [Polydipsia] : no polydipsia [de-identified] : +le edema

## 2024-03-11 NOTE — ASSESSMENT
[FreeTextEntry1] : 68 year old male with PMH TIA and PFO s/p closure, CAD s/p MI and PCI, Type 2 DM, HTN and HLD. His diabetes is well controlled.   1. T2DM- Continue current dose of Mounjaro and Jardiance. Doing well but has room for improvement with diet efforts.  2. HLD- continue high dose rosuvastatin and fish oil therapy (gets over the counter) 3. HTN- continue Losartan. 4. Need updated vit d levels 5. 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

## 2024-05-01 ENCOUNTER — RX RENEWAL (OUTPATIENT)
Age: 69
End: 2024-05-01

## 2024-05-21 ENCOUNTER — NON-APPOINTMENT (OUTPATIENT)
Age: 69
End: 2024-05-21

## 2024-05-22 ENCOUNTER — APPOINTMENT (OUTPATIENT)
Dept: NEUROLOGY | Facility: CLINIC | Age: 69
End: 2024-05-22
Payer: MEDICARE

## 2024-05-22 VITALS
DIASTOLIC BLOOD PRESSURE: 70 MMHG | HEART RATE: 75 BPM | HEIGHT: 71 IN | SYSTOLIC BLOOD PRESSURE: 121 MMHG | TEMPERATURE: 98.2 F | WEIGHT: 230 LBS | BODY MASS INDEX: 32.2 KG/M2

## 2024-05-22 DIAGNOSIS — G47.33 OBSTRUCTIVE SLEEP APNEA (ADULT) (PEDIATRIC): ICD-10-CM

## 2024-05-22 DIAGNOSIS — G31.84 MILD COGNITIVE IMPAIRMENT, SO STATED: ICD-10-CM

## 2024-05-22 DIAGNOSIS — G62.9 POLYNEUROPATHY, UNSPECIFIED: ICD-10-CM

## 2024-05-22 PROCEDURE — 99213 OFFICE O/P EST LOW 20 MIN: CPT

## 2024-05-22 PROCEDURE — G2211 COMPLEX E/M VISIT ADD ON: CPT

## 2024-05-22 RX ORDER — CITALOPRAM HYDROBROMIDE 10 MG/1
10 TABLET, FILM COATED ORAL DAILY
Qty: 270 | Refills: 3 | Status: ACTIVE | COMMUNITY
Start: 2021-03-12 | End: 1900-01-01

## 2024-05-22 RX ORDER — GABAPENTIN 300 MG/1
300 CAPSULE ORAL
Qty: 540 | Refills: 3 | Status: ACTIVE | COMMUNITY
Start: 2019-01-29 | End: 1900-01-01

## 2024-05-22 NOTE — DATA REVIEWED
[de-identified] : MRI brain 4/25/16: minimal white matter microvascular disease\par  \par  MRI brain 2/28/19: No significant abnormality\par  \par  MRA brain 3/4/19: normal\par   [de-identified] : Neurotrax 5/24/16: Mild cognitive impairment. Cognitive domain score for verbal function is impaired\par  \par  Neuropsych evaluation January 2021: Mild cognitive disorder, Dysthymic disorder [de-identified] : Split Night Study 5/9/17: AHI 62.5 No PLMS Therapeutic portion: optimal pressure at 12 cm H2O  blood tests 10/18/21: TSH 2.09, vitamin B12 592, vitamin D 31.5  MRI lumbar spine 4/25/24:  Grade 1 retrolisthesis of L3 on L4, L4 on L5 and L5 on S1.  At L1-2: Disc bulge without canal stenosis or neural foraminal narrowing.  At L2-3: Disc bulge with posterior osseous ridging and bilateral facet arthrosis resulting in mild spinal canal stenosis and mild bilateral neural foraminal narrowing.  At L3-4: Diffuse disc bulge with posterior osseous ridging and bilateral facet arthrosis resulting in moderate spinal canal stenosis and moderate to severe bilateral neural foraminal narrowing.  At L4-5: Postlaminectomy changes are noted. There is a diffuse disc bulge with bilateral facet arthrosis resulting in severe right and moderate to severe left-sided neural foraminal narrowing. There is no spinal canal stenosis at the operative level.  At L5-S1: Postlaminectomy changes are noted. There is a disc bulge with a superimposed right lateralizing disc herniation with bilateral facet arthrosis resulting in severe bilateral neural foraminal narrowing.  Interval post laminectomy changes from L4-S1 with significant improvement in the canal patency at the operative levels.

## 2024-05-22 NOTE — DISCUSSION/SUMMARY
[FreeTextEntry1] :     Mr. Moore is a 68 year old man with multiple medical problems including obstructive sleep apnea, neuropathy, mild cognitive impairment.   # Obstructive sleep apnea - Previous polysomnogram showed evidence of severe MILLICENT. He has been compliant with CPAP.  He has benefit from CPAP use and will continue to use it on a nightly basis.    #  Mild cognitive impairment - Neuropsych testing in the past has showed mild cognitive impairment. He feels that his memory is getting worse. We discussed medication options.  Donepezil has been avoided due to possible diarrhea.  Restart Memantine. Start 5 mg/day x 1 week, then 5 mg bid x 1 week, then 10 mg and 5 mg x 1 week and then 10 mg BID. Continue to participate in physically and mentally stimulating activities. Continue nightly use of CPAP.  -Since he is experiencing some worsening of memory which he feels was fairly abrupt and veering to the right when walking, will get repeat MRI brain.   Neuropathy TSH, B12 normal HbA1C is elevated Work on improving blood sugar Continue gabapentin. He currently takes 900 mg BID.   # Recent fall, lower extremity weakness.  No significant weakness on exam.  He has an upcoming appointment with orthopedics.  # Depression, dysthymic disorder. Continue citalopram 30 mg/day  f/u 6 months.

## 2024-05-22 NOTE — CONSULT LETTER
[Dear  ___] : Dear  [unfilled], [Consult Letter:] : I had the pleasure of evaluating your patient, [unfilled]. [Please see my note below.] : Please see my note below. [Consult Closing:] : Thank you very much for allowing me to participate in the care of this patient.  If you have any questions, please do not hesitate to contact me. [FreeTextEntry2] : Antoine Gutierrez [FreeTextEntry3] : Sincerely,   Angi Pierson MD Diplomate, American Academy of Psychiatry and Neurology Board Certified in the Subspecialty of Clinical Neurophysiology Board Certified in the Subspecialty of Sleep Medicine Board Certified in the Subspecialty of Epilepsy

## 2024-05-22 NOTE — PHYSICAL EXAM
[FreeTextEntry1] : Examination: Constitutional: normal, no apparent distress Eyes: normal conjunctiva b/l, no ptosis, visual fields full Respiratory: no respiratory distress, normal effort, normal auscultation Cardiovascular: normal rate, rhythm, no murmurs Neck: supple, no masses Vascular: carotids normal Skin: normal color, no rashes Psych: normal mood, affect  Neurological: Memory: Oriented to person, place, time. Recalled 2/3 words after 3 minutes Language intact/no aphasia Cranial Nerves: II-XII intact, Pupils equally round and reactive to light, ocular muscles/movements intact, no ptosis, no facial weakness, tongue protrudes normally in the midline, Motor: normal tone, no pronator drift, full strength in all four extremities in the proximal and distal muscle groups Coordination: Fine motor movements intact, rapid alternating movements intact, finger to nose intact bilaterally Sensory: intact to light touch DTRs: hypoactive Gait: using cane, slow.

## 2024-05-24 ENCOUNTER — NON-APPOINTMENT (OUTPATIENT)
Age: 69
End: 2024-05-24

## 2024-05-24 DIAGNOSIS — Z46.9 ENCOUNTER FOR FITTING AND ADJUSTMENT OF UNSPECIFIED DEVICE: ICD-10-CM

## 2024-05-24 DIAGNOSIS — Q66.6 OTHER CONGENITAL VALGUS DEFORMITIES OF FEET: ICD-10-CM

## 2024-05-24 DIAGNOSIS — Z97.8 PRESENCE OF OTHER SPECIFIED DEVICES: ICD-10-CM

## 2024-05-24 DIAGNOSIS — M72.2 PLANTAR FASCIAL FIBROMATOSIS: ICD-10-CM

## 2024-06-24 ENCOUNTER — RX RENEWAL (OUTPATIENT)
Age: 69
End: 2024-06-24

## 2024-08-22 ENCOUNTER — APPOINTMENT (OUTPATIENT)
Dept: ENDOCRINOLOGY | Facility: CLINIC | Age: 69
End: 2024-08-22
Payer: MEDICARE

## 2024-08-22 VITALS
BODY MASS INDEX: 32.06 KG/M2 | DIASTOLIC BLOOD PRESSURE: 70 MMHG | OXYGEN SATURATION: 97 % | RESPIRATION RATE: 16 BRPM | HEART RATE: 74 BPM | HEIGHT: 71 IN | WEIGHT: 229 LBS | SYSTOLIC BLOOD PRESSURE: 124 MMHG

## 2024-08-22 DIAGNOSIS — E78.00 PURE HYPERCHOLESTEROLEMIA, UNSPECIFIED: ICD-10-CM

## 2024-08-22 DIAGNOSIS — E11.9 TYPE 2 DIABETES MELLITUS W/OUT COMPLICATIONS: ICD-10-CM

## 2024-08-22 DIAGNOSIS — I10 ESSENTIAL (PRIMARY) HYPERTENSION: ICD-10-CM

## 2024-08-22 LAB — GLUCOSE BLDC GLUCOMTR-MCNC: 132

## 2024-08-22 PROCEDURE — G2211 COMPLEX E/M VISIT ADD ON: CPT

## 2024-08-22 PROCEDURE — 99214 OFFICE O/P EST MOD 30 MIN: CPT

## 2024-08-22 PROCEDURE — 82962 GLUCOSE BLOOD TEST: CPT

## 2024-08-22 RX ORDER — TIRZEPATIDE 7.5 MG/.5ML
7.5 INJECTION, SOLUTION SUBCUTANEOUS
Qty: 3 | Refills: 1 | Status: ACTIVE | COMMUNITY
Start: 2024-08-22 | End: 1900-01-01

## 2024-08-22 RX ORDER — CLOPIDOGREL BISULFATE 75 MG/1
75 TABLET, FILM COATED ORAL
Refills: 0 | Status: ACTIVE | COMMUNITY

## 2024-08-22 NOTE — DATA REVIEWED
[FreeTextEntry1] : LABS: 8/15/2024: LDL 54 A1c 6.9% UACR 10 25(OH)D 46  12/15/2023: A1c 7.2% UACR 4 LDL 76 TSH 1.23  5/11/2023: LDL 60 UACR 5 A1c 6.9% TSH 1.49

## 2024-08-22 NOTE — HISTORY OF PRESENT ILLNESS
[FreeTextEntry1] : Follow up of DM, RACHEAL Was diagnosed with DM in 2013 when he was hospitalized with TIA.  Was found to have PFO at the time s/p closure.  PMH also significant for CAD.   Quality: Type 2 DM Severity: moderate Duration of diabetes:  since 2013 Associated Complications/ Symptoms:  TIA, CAD s/p MI and PCI (total of 9 stents), neuropathy  Modifying Factors:  Better with medication  SMBG:  has been lax with testing lately.     Current Diabetic Medication Regimen: Mounjaro 5 mg qweek Jardiance 25 mg daily Stopped metformin due to diarrhea.    Complains of increased hunger.  Has lost about 6 pounds.

## 2024-08-22 NOTE — ASSESSMENT
[FreeTextEntry1] : 68 year old male with PMH TIA and PFO s/p closure, CAD s/p MI and PCI, Type 2 DM, HTN and HLD here for follow up.  His DM control is improving.    1. T2DM-   Continue current dose of Jardiance, but will increase Mounjaro to 7.5 mg qweeek to help with appetite suppression and weight loss.    2. HLD- continue rosuvastatin and Ezetimibe.  3. HTN- continue Losartan.   Follow up with NP in 3 months and MD in 6 months.

## 2024-10-02 ENCOUNTER — NON-APPOINTMENT (OUTPATIENT)
Age: 69
End: 2024-10-02

## 2024-11-19 LAB
HBA1C MFR BLD HPLC: 7.1
LDLC SERPL DIRECT ASSAY-MCNC: 53
TSH SERPL-ACNC: 1.28

## 2024-11-20 ENCOUNTER — NON-APPOINTMENT (OUTPATIENT)
Age: 69
End: 2024-11-20

## 2024-11-20 ENCOUNTER — APPOINTMENT (OUTPATIENT)
Dept: ENDOCRINOLOGY | Facility: CLINIC | Age: 69
End: 2024-11-20
Payer: MEDICARE

## 2024-11-20 VITALS
SYSTOLIC BLOOD PRESSURE: 138 MMHG | BODY MASS INDEX: 31.92 KG/M2 | HEIGHT: 71 IN | HEART RATE: 81 BPM | OXYGEN SATURATION: 97 % | DIASTOLIC BLOOD PRESSURE: 72 MMHG | WEIGHT: 228 LBS

## 2024-11-20 DIAGNOSIS — E11.9 TYPE 2 DIABETES MELLITUS W/OUT COMPLICATIONS: ICD-10-CM

## 2024-11-20 DIAGNOSIS — E55.9 VITAMIN D DEFICIENCY, UNSPECIFIED: ICD-10-CM

## 2024-11-20 DIAGNOSIS — E53.8 DEFICIENCY OF OTHER SPECIFIED B GROUP VITAMINS: ICD-10-CM

## 2024-11-20 DIAGNOSIS — I10 ESSENTIAL (PRIMARY) HYPERTENSION: ICD-10-CM

## 2024-11-20 DIAGNOSIS — E78.00 PURE HYPERCHOLESTEROLEMIA, UNSPECIFIED: ICD-10-CM

## 2024-11-20 LAB — GLUCOSE BLDC GLUCOMTR-MCNC: 142

## 2024-11-20 PROCEDURE — 82962 GLUCOSE BLOOD TEST: CPT

## 2024-11-20 PROCEDURE — 99214 OFFICE O/P EST MOD 30 MIN: CPT

## 2024-11-20 PROCEDURE — G2211 COMPLEX E/M VISIT ADD ON: CPT

## 2024-11-20 RX ORDER — GLIMEPIRIDE 1 MG/1
1 TABLET ORAL
Qty: 90 | Refills: 1 | Status: ACTIVE | COMMUNITY
Start: 2024-11-20 | End: 1900-01-01

## 2024-11-26 ENCOUNTER — NON-APPOINTMENT (OUTPATIENT)
Age: 69
End: 2024-11-26

## 2024-11-26 ENCOUNTER — APPOINTMENT (OUTPATIENT)
Dept: NEUROLOGY | Facility: CLINIC | Age: 69
End: 2024-11-26
Payer: MEDICAID

## 2024-11-26 ENCOUNTER — APPOINTMENT (OUTPATIENT)
Dept: NEUROLOGY | Facility: CLINIC | Age: 69
End: 2024-11-26

## 2024-11-26 VITALS
SYSTOLIC BLOOD PRESSURE: 142 MMHG | HEART RATE: 81 BPM | WEIGHT: 225 LBS | HEIGHT: 71 IN | DIASTOLIC BLOOD PRESSURE: 76 MMHG | BODY MASS INDEX: 31.5 KG/M2 | TEMPERATURE: 98.2 F

## 2024-11-26 DIAGNOSIS — G31.84 MILD COGNITIVE IMPAIRMENT, SO STATED: ICD-10-CM

## 2024-11-26 DIAGNOSIS — G62.9 POLYNEUROPATHY, UNSPECIFIED: ICD-10-CM

## 2024-11-26 DIAGNOSIS — M54.16 RADICULOPATHY, LUMBAR REGION: ICD-10-CM

## 2024-11-26 PROCEDURE — G2211 COMPLEX E/M VISIT ADD ON: CPT | Mod: NC

## 2024-11-26 PROCEDURE — 99204 OFFICE O/P NEW MOD 45 MIN: CPT

## 2024-11-26 PROCEDURE — 99214 OFFICE O/P EST MOD 30 MIN: CPT

## 2024-12-03 ENCOUNTER — NON-APPOINTMENT (OUTPATIENT)
Age: 69
End: 2024-12-03

## 2024-12-31 ENCOUNTER — RX RENEWAL (OUTPATIENT)
Age: 69
End: 2024-12-31

## 2025-02-18 ENCOUNTER — RX RENEWAL (OUTPATIENT)
Age: 70
End: 2025-02-18

## 2025-02-21 LAB
HBA1C MFR BLD HPLC: 6.9
LDLC SERPL DIRECT ASSAY-MCNC: 64
MICROALBUMIN/CREAT 24H UR-RTO: 10
TSH SERPL-ACNC: 1.59

## 2025-02-24 ENCOUNTER — APPOINTMENT (OUTPATIENT)
Dept: ENDOCRINOLOGY | Facility: CLINIC | Age: 70
End: 2025-02-24
Payer: MEDICARE

## 2025-02-24 VITALS
HEIGHT: 71 IN | WEIGHT: 235 LBS | OXYGEN SATURATION: 97 % | HEART RATE: 77 BPM | BODY MASS INDEX: 32.9 KG/M2 | DIASTOLIC BLOOD PRESSURE: 80 MMHG | SYSTOLIC BLOOD PRESSURE: 134 MMHG

## 2025-02-24 DIAGNOSIS — E55.9 VITAMIN D DEFICIENCY, UNSPECIFIED: ICD-10-CM

## 2025-02-24 DIAGNOSIS — E53.8 DEFICIENCY OF OTHER SPECIFIED B GROUP VITAMINS: ICD-10-CM

## 2025-02-24 DIAGNOSIS — I10 ESSENTIAL (PRIMARY) HYPERTENSION: ICD-10-CM

## 2025-02-24 DIAGNOSIS — E11.9 TYPE 2 DIABETES MELLITUS W/OUT COMPLICATIONS: ICD-10-CM

## 2025-02-24 DIAGNOSIS — E78.00 PURE HYPERCHOLESTEROLEMIA, UNSPECIFIED: ICD-10-CM

## 2025-02-24 LAB — GLUCOSE BLDC GLUCOMTR-MCNC: 160

## 2025-02-24 PROCEDURE — 99214 OFFICE O/P EST MOD 30 MIN: CPT

## 2025-02-24 PROCEDURE — 82962 GLUCOSE BLOOD TEST: CPT

## 2025-02-24 PROCEDURE — G2211 COMPLEX E/M VISIT ADD ON: CPT

## 2025-02-24 RX ORDER — TIRZEPATIDE 7.5 MG/.5ML
7.5 INJECTION, SOLUTION SUBCUTANEOUS
Qty: 3 | Refills: 1 | Status: ACTIVE | COMMUNITY
Start: 2025-02-24 | End: 1900-01-01

## 2025-02-26 ENCOUNTER — APPOINTMENT (OUTPATIENT)
Dept: NEUROLOGY | Facility: CLINIC | Age: 70
End: 2025-02-26
Payer: MEDICARE

## 2025-02-26 PROCEDURE — 95910 NRV CNDJ TEST 7-8 STUDIES: CPT

## 2025-02-26 PROCEDURE — 95885 MUSC TST DONE W/NERV TST LIM: CPT

## 2025-02-26 PROCEDURE — 99213 OFFICE O/P EST LOW 20 MIN: CPT

## 2025-03-06 ENCOUNTER — APPOINTMENT (OUTPATIENT)
Dept: NEUROLOGY | Facility: CLINIC | Age: 70
End: 2025-03-06
Payer: MEDICARE

## 2025-03-06 VITALS
TEMPERATURE: 98.2 F | WEIGHT: 235 LBS | DIASTOLIC BLOOD PRESSURE: 69 MMHG | BODY MASS INDEX: 33.64 KG/M2 | SYSTOLIC BLOOD PRESSURE: 108 MMHG | HEART RATE: 87 BPM | HEIGHT: 70 IN

## 2025-03-06 DIAGNOSIS — M54.16 RADICULOPATHY, LUMBAR REGION: ICD-10-CM

## 2025-03-06 DIAGNOSIS — R20.2 PARESTHESIA OF SKIN: ICD-10-CM

## 2025-03-06 PROCEDURE — G2211 COMPLEX E/M VISIT ADD ON: CPT

## 2025-03-06 PROCEDURE — 99213 OFFICE O/P EST LOW 20 MIN: CPT

## 2025-03-06 RX ORDER — DICLOFENAC SODIUM 75 MG/1
75 TABLET, DELAYED RELEASE ORAL
Refills: 0 | Status: ACTIVE | COMMUNITY

## 2025-03-07 ENCOUNTER — NON-APPOINTMENT (OUTPATIENT)
Age: 70
End: 2025-03-07

## 2025-03-17 ENCOUNTER — APPOINTMENT (OUTPATIENT)
Dept: ORTHOPEDIC SURGERY | Facility: CLINIC | Age: 70
End: 2025-03-17
Payer: MEDICARE

## 2025-03-17 VITALS — WEIGHT: 235 LBS | BODY MASS INDEX: 33.64 KG/M2 | HEIGHT: 70 IN

## 2025-03-17 DIAGNOSIS — M25.531 PAIN IN RIGHT WRIST: ICD-10-CM

## 2025-03-17 DIAGNOSIS — G56.03 CARPAL TUNNEL SYNDROM,BILATERAL UPPER LIMBS: ICD-10-CM

## 2025-03-17 DIAGNOSIS — M25.532 PAIN IN RIGHT WRIST: ICD-10-CM

## 2025-03-17 PROCEDURE — 20526 THER INJECTION CARP TUNNEL: CPT | Mod: 50

## 2025-03-17 PROCEDURE — 73110 X-RAY EXAM OF WRIST: CPT | Mod: 50

## 2025-03-17 PROCEDURE — 99215 OFFICE O/P EST HI 40 MIN: CPT | Mod: 25

## 2025-03-20 ENCOUNTER — NON-APPOINTMENT (OUTPATIENT)
Age: 70
End: 2025-03-20

## 2025-03-20 ENCOUNTER — APPOINTMENT (OUTPATIENT)
Dept: ORTHOPEDIC SURGERY | Facility: CLINIC | Age: 70
End: 2025-03-20
Payer: MEDICARE

## 2025-03-20 VITALS
WEIGHT: 235 LBS | HEART RATE: 73 BPM | SYSTOLIC BLOOD PRESSURE: 144 MMHG | DIASTOLIC BLOOD PRESSURE: 83 MMHG | BODY MASS INDEX: 33.64 KG/M2 | HEIGHT: 70 IN

## 2025-03-20 DIAGNOSIS — Z98.890 OTHER SPECIFIED POSTPROCEDURAL STATES: ICD-10-CM

## 2025-03-20 DIAGNOSIS — M54.12 DISEASE OF SPINAL CORD, UNSPECIFIED: ICD-10-CM

## 2025-03-20 DIAGNOSIS — M47.816 SPONDYLOSIS W/OUT MYELOPATHY OR RADICULOPATHY, LUMBAR REGION: ICD-10-CM

## 2025-03-20 DIAGNOSIS — G95.9 DISEASE OF SPINAL CORD, UNSPECIFIED: ICD-10-CM

## 2025-03-20 PROCEDURE — 99215 OFFICE O/P EST HI 40 MIN: CPT

## 2025-03-20 PROCEDURE — 72040 X-RAY EXAM NECK SPINE 2-3 VW: CPT

## 2025-03-21 ENCOUNTER — NON-APPOINTMENT (OUTPATIENT)
Age: 70
End: 2025-03-21

## 2025-03-28 ENCOUNTER — OUTPATIENT (OUTPATIENT)
Dept: OUTPATIENT SERVICES | Facility: HOSPITAL | Age: 70
LOS: 1 days | End: 2025-03-28
Payer: MEDICARE

## 2025-03-28 VITALS
TEMPERATURE: 98 F | RESPIRATION RATE: 18 BRPM | SYSTOLIC BLOOD PRESSURE: 108 MMHG | HEART RATE: 65 BPM | WEIGHT: 227.08 LBS | DIASTOLIC BLOOD PRESSURE: 58 MMHG | HEIGHT: 70 IN | OXYGEN SATURATION: 96 %

## 2025-03-28 DIAGNOSIS — Z87.74 PERSONAL HISTORY OF (CORRECTED) CONGENITAL MALFORMATIONS OF HEART AND CIRCULATORY SYSTEM: Chronic | ICD-10-CM

## 2025-03-28 DIAGNOSIS — E11.9 TYPE 2 DIABETES MELLITUS WITHOUT COMPLICATIONS: ICD-10-CM

## 2025-03-28 DIAGNOSIS — Z98.890 OTHER SPECIFIED POSTPROCEDURAL STATES: Chronic | ICD-10-CM

## 2025-03-28 DIAGNOSIS — Z29.9 ENCOUNTER FOR PROPHYLACTIC MEASURES, UNSPECIFIED: ICD-10-CM

## 2025-03-28 DIAGNOSIS — I25.10 ATHEROSCLEROTIC HEART DISEASE OF NATIVE CORONARY ARTERY WITHOUT ANGINA PECTORIS: ICD-10-CM

## 2025-03-28 DIAGNOSIS — Z01.818 ENCOUNTER FOR OTHER PREPROCEDURAL EXAMINATION: ICD-10-CM

## 2025-03-28 DIAGNOSIS — G95.9 DISEASE OF SPINAL CORD, UNSPECIFIED: ICD-10-CM

## 2025-03-28 DIAGNOSIS — I10 ESSENTIAL (PRIMARY) HYPERTENSION: ICD-10-CM

## 2025-03-28 DIAGNOSIS — G47.33 OBSTRUCTIVE SLEEP APNEA (ADULT) (PEDIATRIC): ICD-10-CM

## 2025-03-28 LAB
A1C WITH ESTIMATED AVERAGE GLUCOSE RESULT: 6.9 % — HIGH (ref 4–5.6)
ALBUMIN SERPL ELPH-MCNC: 4.1 G/DL — SIGNIFICANT CHANGE UP (ref 3.3–5.2)
ALP SERPL-CCNC: 48 U/L — SIGNIFICANT CHANGE UP (ref 40–120)
ALT FLD-CCNC: 40 U/L — SIGNIFICANT CHANGE UP
ANION GAP SERPL CALC-SCNC: 12 MMOL/L — SIGNIFICANT CHANGE UP (ref 5–17)
APTT BLD: 33.1 SEC — SIGNIFICANT CHANGE UP (ref 24.5–35.6)
AST SERPL-CCNC: 24 U/L — SIGNIFICANT CHANGE UP
BASOPHILS # BLD AUTO: 0.08 K/UL — SIGNIFICANT CHANGE UP (ref 0–0.2)
BASOPHILS NFR BLD AUTO: 0.8 % — SIGNIFICANT CHANGE UP (ref 0–2)
BILIRUB SERPL-MCNC: 0.2 MG/DL — LOW (ref 0.4–2)
BLD GP AB SCN SERPL QL: SIGNIFICANT CHANGE UP
BUN SERPL-MCNC: 15.1 MG/DL — SIGNIFICANT CHANGE UP (ref 8–20)
CALCIUM SERPL-MCNC: 8.6 MG/DL — SIGNIFICANT CHANGE UP (ref 8.4–10.5)
CHLORIDE SERPL-SCNC: 103 MMOL/L — SIGNIFICANT CHANGE UP (ref 96–108)
CO2 SERPL-SCNC: 24 MMOL/L — SIGNIFICANT CHANGE UP (ref 22–29)
CREAT SERPL-MCNC: 0.71 MG/DL — SIGNIFICANT CHANGE UP (ref 0.5–1.3)
EGFR: 99 ML/MIN/1.73M2 — SIGNIFICANT CHANGE UP
EGFR: 99 ML/MIN/1.73M2 — SIGNIFICANT CHANGE UP
EOSINOPHIL # BLD AUTO: 0.11 K/UL — SIGNIFICANT CHANGE UP (ref 0–0.5)
EOSINOPHIL NFR BLD AUTO: 1.1 % — SIGNIFICANT CHANGE UP (ref 0–6)
ESTIMATED AVERAGE GLUCOSE: 151 MG/DL — HIGH (ref 68–114)
GLUCOSE SERPL-MCNC: 108 MG/DL — HIGH (ref 70–99)
HCT VFR BLD CALC: 46.2 % — SIGNIFICANT CHANGE UP (ref 39–50)
HGB BLD-MCNC: 14.4 G/DL — SIGNIFICANT CHANGE UP (ref 13–17)
IMM GRANULOCYTES # BLD AUTO: 0.05 K/UL — SIGNIFICANT CHANGE UP (ref 0–0.07)
IMM GRANULOCYTES NFR BLD AUTO: 0.5 % — SIGNIFICANT CHANGE UP (ref 0–0.9)
INR BLD: 1.01 RATIO — SIGNIFICANT CHANGE UP (ref 0.85–1.16)
LYMPHOCYTES # BLD AUTO: 1.61 K/UL — SIGNIFICANT CHANGE UP (ref 1–3.3)
LYMPHOCYTES NFR BLD AUTO: 16.6 % — SIGNIFICANT CHANGE UP (ref 13–44)
MCHC RBC-ENTMCNC: 27.6 PG — SIGNIFICANT CHANGE UP (ref 27–34)
MCHC RBC-ENTMCNC: 31.2 G/DL — LOW (ref 32–36)
MCV RBC AUTO: 88.7 FL — SIGNIFICANT CHANGE UP (ref 80–100)
MONOCYTES # BLD AUTO: 0.84 K/UL — SIGNIFICANT CHANGE UP (ref 0–0.9)
MONOCYTES NFR BLD AUTO: 8.7 % — SIGNIFICANT CHANGE UP (ref 2–14)
MRSA PCR RESULT.: SIGNIFICANT CHANGE UP
NEUTROPHILS # BLD AUTO: 6.98 K/UL — SIGNIFICANT CHANGE UP (ref 1.8–7.4)
NEUTROPHILS NFR BLD AUTO: 72.3 % — SIGNIFICANT CHANGE UP (ref 43–77)
NRBC # BLD AUTO: 0 K/UL — SIGNIFICANT CHANGE UP (ref 0–0)
NRBC # FLD: 0 K/UL — SIGNIFICANT CHANGE UP (ref 0–0)
NRBC BLD AUTO-RTO: 0 /100 WBCS — SIGNIFICANT CHANGE UP (ref 0–0)
PLATELET # BLD AUTO: 225 K/UL — SIGNIFICANT CHANGE UP (ref 150–400)
PMV BLD: 9.1 FL — SIGNIFICANT CHANGE UP (ref 7–13)
POTASSIUM SERPL-MCNC: 4.2 MMOL/L — SIGNIFICANT CHANGE UP (ref 3.5–5.3)
POTASSIUM SERPL-SCNC: 4.2 MMOL/L — SIGNIFICANT CHANGE UP (ref 3.5–5.3)
PROT SERPL-MCNC: 6.7 G/DL — SIGNIFICANT CHANGE UP (ref 6.6–8.7)
PROTHROM AB SERPL-ACNC: 11.4 SEC — SIGNIFICANT CHANGE UP (ref 9.9–13.4)
RBC # BLD: 5.21 M/UL — SIGNIFICANT CHANGE UP (ref 4.2–5.8)
RBC # FLD: 14.7 % — HIGH (ref 10.3–14.5)
S AUREUS DNA NOSE QL NAA+PROBE: DETECTED
SODIUM SERPL-SCNC: 139 MMOL/L — SIGNIFICANT CHANGE UP (ref 135–145)
WBC # BLD: 9.67 K/UL — SIGNIFICANT CHANGE UP (ref 3.8–10.5)
WBC # FLD AUTO: 9.67 K/UL — SIGNIFICANT CHANGE UP (ref 3.8–10.5)

## 2025-03-28 PROCEDURE — 80053 COMPREHEN METABOLIC PANEL: CPT

## 2025-03-28 PROCEDURE — 93010 ELECTROCARDIOGRAM REPORT: CPT

## 2025-03-28 PROCEDURE — 85610 PROTHROMBIN TIME: CPT

## 2025-03-28 PROCEDURE — 86901 BLOOD TYPING SEROLOGIC RH(D): CPT

## 2025-03-28 PROCEDURE — 83036 HEMOGLOBIN GLYCOSYLATED A1C: CPT

## 2025-03-28 PROCEDURE — 93005 ELECTROCARDIOGRAM TRACING: CPT

## 2025-03-28 PROCEDURE — 87641 MR-STAPH DNA AMP PROBE: CPT

## 2025-03-28 PROCEDURE — 86900 BLOOD TYPING SEROLOGIC ABO: CPT

## 2025-03-28 PROCEDURE — 86850 RBC ANTIBODY SCREEN: CPT

## 2025-03-28 PROCEDURE — 71046 X-RAY EXAM CHEST 2 VIEWS: CPT

## 2025-03-28 PROCEDURE — 71046 X-RAY EXAM CHEST 2 VIEWS: CPT | Mod: 26

## 2025-03-28 PROCEDURE — 36415 COLL VENOUS BLD VENIPUNCTURE: CPT

## 2025-03-28 PROCEDURE — 87640 STAPH A DNA AMP PROBE: CPT

## 2025-03-28 PROCEDURE — 85730 THROMBOPLASTIN TIME PARTIAL: CPT

## 2025-03-28 PROCEDURE — G0463: CPT

## 2025-03-28 PROCEDURE — 85025 COMPLETE CBC W/AUTO DIFF WBC: CPT

## 2025-03-28 RX ORDER — POVIDONE-IODINE 7.5 %
1 SOLUTION, NON-ORAL TOPICAL ONCE
Refills: 0 | Status: COMPLETED | OUTPATIENT
Start: 2025-04-02 | End: 2025-04-02

## 2025-03-28 RX ORDER — MUPIROCIN 20 MG/G
2 OINTMENT TOPICAL
Qty: 1 | Refills: 0 | Status: ACTIVE | COMMUNITY
Start: 2025-03-28 | End: 1900-01-01

## 2025-03-28 RX ORDER — MUPIROCIN CALCIUM 20 MG/G
1 CREAM TOPICAL
Qty: 1 | Refills: 0
Start: 2025-03-28 | End: 2025-04-01

## 2025-03-28 RX ORDER — CEFAZOLIN SODIUM IN 0.9 % NACL 3 G/100 ML
2000 INTRAVENOUS SOLUTION, PIGGYBACK (ML) INTRAVENOUS ONCE
Refills: 0 | Status: DISCONTINUED | OUTPATIENT
Start: 2025-04-02 | End: 2025-04-02

## 2025-03-28 NOTE — H&P PST ADULT - PROBLEM SELECTOR PLAN 5
-Instructed to refer to cardiologist on when to stop taking aspirin prior to surgery  -Follow up with cardiologist for routine management

## 2025-03-28 NOTE — H&P PST ADULT - NSICDXPASTMEDICALHX_GEN_ALL_CORE_FT
PAST MEDICAL HISTORY:  Disc disease with myelopathy, cervical     History of colitis     Myocardial infarction     PFO (patent foramen ovale)     Radiculopathy, cervical     Transient ischemic attack (TIA)      PAST MEDICAL HISTORY:  Alzheimer disease     Diabetes type 2     Disc disease with myelopathy, cervical     GERD (gastroesophageal reflux disease)     History of colitis     HTN (hypertension)     Myocardial infarction     Obstructive sleep apnea on CPAP     PFO (patent foramen ovale)     Radiculopathy, cervical     Transient ischemic attack (TIA)      PAST MEDICAL HISTORY:  Alzheimer disease     Diabetes type 2     Disc disease with myelopathy, cervical     GERD (gastroesophageal reflux disease)     History of colitis     HTN (hypertension)     Myocardial infarction     Obstructive sleep apnea on CPAP     PFO (patent foramen ovale)     Radiculopathy, cervical     Rectus diastasis     Transient ischemic attack (TIA)

## 2025-03-28 NOTE — H&P PST ADULT - HISTORY OF PRESENT ILLNESS
Very pleasant gentleman presents with his wife for a timely/urgent surgical discussion with regard to a two-level ACDF. Patient was diagnosed with cervical myelopathy in a progressive state in 2023 recommended an ACDF followed by a lumbar laminectomy. He decided to present to [facility] where lumbar laminectomy was performed. He states and over the last year has been progressively worsening with regard to cervical myelopathy he is off balance he has for lower extremity weakness. Loss of dexterity. Concern for a very progressive and prominent myelopathic findings. Patient's last cervical imaging was in 2023. Remi Moore is a 69 year old male with PMH significant for MI, s/p PCI x        Very pleasant gentleman presents with his wife for a timely/urgent surgical discussion with regard to a two-level ACDF. Patient was diagnosed with cervical myelopathy in a progressive state in 2023 recommended an ACDF followed by a lumbar laminectomy. He decided to present to [facility] where lumbar laminectomy was performed. He states and over the last year has been progressively worsening with regard to cervical myelopathy he is off balance he has for lower extremity weakness. Loss of dexterity. Concern for a very progressive and prominent myelopathic findings. Patient's last cervical imaging was in 2023.      Patient reports experiencing moderate to severe, aching, burning neck pain that radiates down bilateral UE  Amits to bilateral UE weakness, all fingers with numbness and tingling  Waddle walking   Recent MRI revealed significant  impingement     Remi Moore is a 69 year old male with PMH significant for MI, s/p PCI x 11, TIA, PFO s/p PFO closure, HTN, HLD, DM2, colitis, alzheimer's, and MILLICENT on CPAP. Patient reports he was previous diagnosed in 2023 with cervical myelopathy and lumbar disc disease at which time he underwent a lumbar laminectomy at [facility]. He states he noted an improvement in his neck pain after surgery but in the past couple of months has noted a progressive worsening in pain. Reports neck pain as moderate to severe aching, and burning that radiates down bilateral upper extremities. Admits to associated bilateral hand weakness, numbness and tingling in all fingers, and feeling off balance. Patient denies fever, chills, changes in bladder or bowel habits. Patient was seen today at [facility] with radiculopathy cervical region for scheduled anterior cervical discectomy and fusion C5-C6, C5-C7 with Dr. Spencer on 4/2/25.

## 2025-03-28 NOTE — H&P PST ADULT - ATTENDING COMMENTS
Attending statement:  I have personally seen this patient, and formed a face to face diagnostic evaluation on this patient on this date.  I have reviewed the PA, NP and or Medical/PA student and/or Resident documentation and agree with the history, physical exam and plan of care except if noted otherwise.    Patient presents for two-level ACDF at C5-C6 and C6-C7.  I do anticipate incomplete resolution of signs and symptoms secondary to his duration of cervical myelopathy for a few years.  I would not address the foraminal stenosis at C4-C5 secondary to his C5 nerve root essentially being asymptomatic at this point in time with radiculopathy and focal bicep weakness I am focusing on a much larger concept of diminishing his spinal stenosis at C5-C6 C6-C7 secondary to chronic myelopathy patient is aware of dysphagia dysphonia incomplete resolution of signs and symptoms as addressed above.  Patient wishes to proceed.  Patient is also aware of adjacent segment disease potential utilization of ACDF at C4-C5 in the future.

## 2025-03-28 NOTE — H&P PST ADULT - WEIGHT IN LBS
Problem: Goal Outcome Summary  Goal: Goal Outcome Summary  Outcome: Improving  Pt presented with VSS, denying pain. Lungs clear but dim in LLL. Pt has been confused to place, time, and situation on and off throughout the day. Has needed to urinate every 15-30 minutes today, urinating very small volumes each time. See prior note. Pyridium given with mild results. Needs multiple reminders to stay seated until staff is in the room helping her get up. Pt did have a fall today, see prior note. No report of any additional pain and no redness or bruising on bottom from fall. IV is saline locked, flushes without difficulty. Will continue to monitor.      Temp: 96.9  F (36.1  C) Temp src: Tympanic BP: 149/67   Heart Rate: 90 Resp: 18 SpO2: (!) 91 % O2 Device: None (Room air)             404

## 2025-03-28 NOTE — H&P PST ADULT - ASSESSMENT
Remi Moore is a 69 year old male with PMH significant for MI, s/p PCI x 11, TIA, PFO s/p PFO closure, HTN, HLD, DM2, colitis, alzheimer's, and MILLICENT on CPAP. Patient reports he was previous diagnosed in  with cervical myelopathy and lumbar disc disease at which time he underwent a lumbar laminectomy at [facility]. He states he noted an improvement in his neck pain after surgery but in the past couple of months has noted a progressive worsening in pain. Reports neck pain as moderate to severe aching, and burning that radiates down bilateral upper extremities. Admits to associated bilateral hand weakness, numbness and tingling in all fingers, and feeling off balance. Patient denies fever, chills, changes in bladder or bowel habits. Patient was seen today at [facility] with radiculopathy cervical region for scheduled anterior cervical discectomy and fusion C5-C6, C5-C7 with Dr. Spencer on 25.     -Spine booklet provided, ERP protocol reviewed, MSSA/MRSA swab done in [facility], results pending.  -Medical and cardiac evaluation pending  -Patient educated on ERP protocol (written/verbal)   -Instructed to refer to cardiologist on when to stop taking aspirin prior to surgery  -Instructed to stop taking Jardiance 3 days prior to surgery  -Instructed to stop taking Mounjaro 1 week prior to surgery  -Instructed to take routine morning dose of carvedilol and losartan the morning of surgery with a sip of water, no other medications  -Educated on NSAIDS, multivitamins and herbals that increase the risk of bleeding and need to be stopped 5 days before procedure  -Educated on infection prevention  -Tylenol can be taken if needed for pain  -Will continue all other medications as prescribed  -Verbalized understanding of all instructions.        CAPRINI SCORE    AGE RELATED RISK FACTORS                                                             [ ] Age 41-60 years                                            (1 Point)  [x ] Age: 61-74 years                                           (2 Points)                 [ ] Age= 75 years                                                (3 Points)             DISEASE RELATED RISK FACTORS                                                       [ ] Edema in the lower extremities                 (1 Point)                     [ ] Varicose veins                                               (1 Point)                                 [x ] BMI > 25 Kg/m2                                            (1 Point)                                  [ ] Serious infection (ie PNA)                            (1 Point)                     [ ] Lung disease ( COPD, Emphysema)            (1 Point)                                                                          [ ] Acute myocardial infarction                         (1 Point)                  [ ] Congestive heart failure (in the previous month)  (1 Point)         [ ] Inflammatory bowel disease                            (1 Point)                  [ ] Central venous access, PICC or Port               (2 points)       (within the last month)                                                                [ ] Stroke (in the previous month)                        (5 Points)    [ ] Previous or present malignancy                       (2 points)                                                                                                                                                         HEMATOLOGY RELATED FACTORS                                                         [ ] Prior episodes of VTE                                     (3 Points)                     [ ] Positive family history for VTE                      (3 Points)                  [ ] Prothrombin 44001 A                                     (3 Points)                     [ ] Factor V Leiden                                                (3 Points)                        [ ] Lupus anticoagulants                                      (3 Points)                                                           [ ] Anticardiolipin antibodies                              (3 Points)                                                       [ ] High homocysteine in the blood                   (3 Points)                                             [ ] Other congenital or acquired thrombophilia      (3 Points)                                                [ ] Heparin induced thrombocytopenia                  (3 Points)                                        MOBILITY RELATED FACTORS  [ ] Bed rest                                                         (1 Point)  [ ] Plaster cast                                                    (2 points)  [ ] Bed bound for more than 72 hours           (2 Points)    GENDER SPECIFIC FACTORS  [ ] Pregnancy or had a baby within the last month   (1 Point)  [ ] Post-partum < 6 weeks                                   (1 Point)  [ ] Hormonal therapy  or oral contraception   (1 Point)  [ ] History of pregnancy complications              (1 point)  [ ] Unexplained or recurrent              (1 Point)    OTHER RISK FACTORS                                           (1 Point)  [ ] BMI >40, smoking, diabetes requiring insulin, chemotherapy  blood transfusions and length of surgery over 2 hours    SURGERY RELATED RISK FACTORS  [ ]  Section within the last month     (1 Point)  [ ] Minor surgery                                                  (1 Point)  [ ] Arthroscopic surgery                                       (2 Points)  [x ] Planned major surgery lasting more            (2 Points)      than 45 minutes     [ ] Elective hip or knee joint replacement       (5 points)       surgery                                                TRAUMA RELATED RISK FACTORS  [ ] Fracture of the hip, pelvis, or leg                       (5 Points)  [ ] Spinal cord injury resulting in paralysis             (5 points)       (in the previous month)    [ ] Paralysis  (less than 1 month)                             (5 Points)  [ ] Multiple Trauma within 1 month                        (5 Points)    Total Score [    4    ]    Caprini Score 0-2: Low Risk, NO VTE prophylaxis required for most patients, encourage ambulation  Caprini Score 3-6: Moderate Risk , pharmacologic VTE prophylaxis is indicated for most patients (in the absence of contraindications)  Caprini Score Greater than or =7: High risk, pharmocologic VTE prophylaxis indicated for most patients (in the absence of contraindications)      OPIOID RISK TOOL    MARGIE EACH BOX THAT APPLIES AND ADD TOTALS AT THE END    FAMILY HISTORY OF SUBSTANCE ABUSE                 FEMALE         MALE                                                Alcohol                             [  ]1 pt          [  ]3pts                                               Illegal Durgs                     [  ]2 pts        [  ]3pts                                               Rx Drugs                           [  ]4 pts        [  ]4 pts    PERSONAL HISTORY OF SUBSTANCE ABUSE                                                                                          Alcohol                             [  ]3 pts       [  ]3 pts                                               Illegal Drugs                     [  ]4 pts        [  ]4 pts                                               Rx Drugs                           [  ]5 pts        [  ]5 pts    AGE BETWEEN 16-45 YEARS                                      [  ]1 pt         [  ]1 pt    HISTORY OF PREADOLESCENT   SEXUAL ABUSE                                                             [  ]3 pts        [  ]0pts    PSYCHOLOGICAL DISEASE                     ADD, OCD, Bipolar, Schizophrenia        [  ]2 pts         [  ]2 pts                      Depression                                               [  ]1 pt           [  ]1 pt           SCORING TOTAL   (add numbers and type here)              (0)                                     A score of 3 or lower indicated LOW risk for future opioid abuse  A score of 4 to 7 indicated moderate risk for future opioid abuse  A score of 8 or higher indicates a high risk for opioid abuse

## 2025-03-28 NOTE — PROVIDER CONTACT NOTE (OTHER) - RECOMMENDATIONS
emailed video of preop spine class to pt on 3.20, followed with Telephone review of program, all questions answered. Contact information given

## 2025-03-28 NOTE — H&P PST ADULT - NSICDXFAMILYHX_GEN_ALL_CORE_FT
FAMILY HISTORY:  Father  Still living? No  FH: heart disease, Age at diagnosis: Age Unknown    Mother  Still living? Unknown  Family history of CVA, Age at diagnosis: Age Unknown

## 2025-03-28 NOTE — H&P PST ADULT - NSICDXPASTSURGICALHX_GEN_ALL_CORE_FT
PAST SURGICAL HISTORY:  H/O cardiac catheterization     History of cholecystectomy     History of surgical closure of patent foramen ovale (PFO)     S/P lumbar laminectomy

## 2025-03-28 NOTE — H&P PST ADULT - PROBLEM SELECTOR PLAN 3
-Instructed to stop taking Jardiance 3 days prior to surgery  -Instructed to stop taking Mounjaro 1 week prior to surgery  -Follow up with PCP for routine management

## 2025-03-28 NOTE — H&P PST ADULT - PROBLEM SELECTOR PLAN 2
-Instructed to take routine morning dose of carvedilol and losartan the morning of surgery with a sip of water, no other medications  -Follow up with cardiologist for routine management

## 2025-03-28 NOTE — H&P PST ADULT - PROBLEM SELECTOR PLAN 1
Patient was seen today at [facility] with radiculopathy cervical region for scheduled anterior cervical discectomy and fusion C5-C6, C5-C7 with Dr. Spencer on 4/2/25.       -Pending labs, EKG, CXR, MRSA/MSSA  -Medical and cardiac optimization

## 2025-04-01 PROBLEM — Z98.1 S/P CERVICAL SPINAL FUSION: Status: ACTIVE | Noted: 2025-04-01

## 2025-04-01 RX ADMIN — Medication 975 MILLIGRAM(S): at 22:47

## 2025-04-02 ENCOUNTER — APPOINTMENT (OUTPATIENT)
Dept: ORTHOPEDIC SURGERY | Facility: HOSPITAL | Age: 70
End: 2025-04-02

## 2025-04-02 ENCOUNTER — INPATIENT (INPATIENT)
Facility: HOSPITAL | Age: 70
LOS: 2 days | Discharge: HOME | DRG: 473 | End: 2025-04-05
Attending: ORTHOPAEDIC SURGERY | Admitting: ORTHOPAEDIC SURGERY
Payer: MEDICARE

## 2025-04-02 ENCOUNTER — TRANSCRIPTION ENCOUNTER (OUTPATIENT)
Age: 70
End: 2025-04-02

## 2025-04-02 VITALS
OXYGEN SATURATION: 97 % | RESPIRATION RATE: 16 BRPM | DIASTOLIC BLOOD PRESSURE: 83 MMHG | SYSTOLIC BLOOD PRESSURE: 159 MMHG | HEART RATE: 75 BPM | TEMPERATURE: 97 F | HEIGHT: 70 IN | WEIGHT: 227.08 LBS

## 2025-04-02 DIAGNOSIS — Z98.890 OTHER SPECIFIED POSTPROCEDURAL STATES: Chronic | ICD-10-CM

## 2025-04-02 DIAGNOSIS — Z87.74 PERSONAL HISTORY OF (CORRECTED) CONGENITAL MALFORMATIONS OF HEART AND CIRCULATORY SYSTEM: Chronic | ICD-10-CM

## 2025-04-02 LAB
ABO RH CONFIRMATION: SIGNIFICANT CHANGE UP
GLUCOSE BLDC GLUCOMTR-MCNC: 112 MG/DL — HIGH (ref 70–99)
GLUCOSE BLDC GLUCOMTR-MCNC: 151 MG/DL — HIGH (ref 70–99)
GLUCOSE BLDC GLUCOMTR-MCNC: 198 MG/DL — HIGH (ref 70–99)

## 2025-04-02 PROCEDURE — 22853 INSJ BIOMECHANICAL DEVICE: CPT

## 2025-04-02 PROCEDURE — 22551 ARTHRD ANT NTRBDY CERVICAL: CPT | Mod: AS

## 2025-04-02 PROCEDURE — 22552 ARTHRD ANT NTRBD CERVICAL EA: CPT

## 2025-04-02 PROCEDURE — 22551 ARTHRD ANT NTRBDY CERVICAL: CPT

## 2025-04-02 PROCEDURE — 22552 ARTHRD ANT NTRBD CERVICAL EA: CPT | Mod: AS

## 2025-04-02 PROCEDURE — 93010 ELECTROCARDIOGRAM REPORT: CPT

## 2025-04-02 PROCEDURE — 22845 INSERT SPINE FIXATION DEVICE: CPT | Mod: 59

## 2025-04-02 PROCEDURE — 22845 INSERT SPINE FIXATION DEVICE: CPT | Mod: AS,59

## 2025-04-02 PROCEDURE — 22853 INSJ BIOMECHANICAL DEVICE: CPT | Mod: AS

## 2025-04-02 DEVICE — DURASEAL SEALANT 5ML: Type: IMPLANTABLE DEVICE | Status: FUNCTIONAL

## 2025-04-02 DEVICE — DISTRACTION PIN 12MM (BLUE): Type: IMPLANTABLE DEVICE | Status: FUNCTIONAL

## 2025-04-02 DEVICE — IMPLANTABLE DEVICE: Type: IMPLANTABLE DEVICE | Status: FUNCTIONAL

## 2025-04-02 DEVICE — GRAFT DURAL MATRX 3 X 3IN DURGN: Type: IMPLANTABLE DEVICE | Status: FUNCTIONAL

## 2025-04-02 DEVICE — SURGIFLO MATRIX WITH THROMBIN KIT: Type: IMPLANTABLE DEVICE | Status: FUNCTIONAL

## 2025-04-02 DEVICE — GRAFT FIBERGRAFT PUTTY MED 4CC: Type: IMPLANTABLE DEVICE | Status: FUNCTIONAL

## 2025-04-02 DEVICE — AGENT HEMOSTAT COLLAGEN AVITENE ULTRA 8X12.5CM: Type: IMPLANTABLE DEVICE | Status: FUNCTIONAL

## 2025-04-02 RX ORDER — ONDANSETRON HCL/PF 4 MG/2 ML
4 VIAL (ML) INJECTION EVERY 6 HOURS
Refills: 0 | Status: DISCONTINUED | OUTPATIENT
Start: 2025-04-02 | End: 2025-04-02

## 2025-04-02 RX ORDER — FESOTERODINE FUMARATE 4 MG/1
1 TABLET, FILM COATED, EXTENDED RELEASE ORAL
Refills: 0 | DISCHARGE

## 2025-04-02 RX ORDER — CELECOXIB 50 MG/1
200 CAPSULE ORAL EVERY 12 HOURS
Refills: 0 | Status: DISCONTINUED | OUTPATIENT
Start: 2025-04-02 | End: 2025-04-05

## 2025-04-02 RX ORDER — ROSUVASTATIN CALCIUM 5 MG/1
1 TABLET, FILM COATED ORAL
Refills: 0 | DISCHARGE

## 2025-04-02 RX ORDER — METHOCARBAMOL 500 MG/1
750 TABLET, FILM COATED ORAL EVERY 8 HOURS
Refills: 0 | Status: DISCONTINUED | OUTPATIENT
Start: 2025-04-02 | End: 2025-04-05

## 2025-04-02 RX ORDER — SODIUM CHLORIDE 9 G/1000ML
1000 INJECTION, SOLUTION INTRAVENOUS
Refills: 0 | Status: DISCONTINUED | OUTPATIENT
Start: 2025-04-02 | End: 2025-04-05

## 2025-04-02 RX ORDER — OXYBUTYNIN CHLORIDE 5 MG/1
5 TABLET, FILM COATED, EXTENDED RELEASE ORAL
Refills: 0 | Status: DISCONTINUED | OUTPATIENT
Start: 2025-04-02 | End: 2025-04-05

## 2025-04-02 RX ORDER — ROSUVASTATIN CALCIUM 20 MG/1
40 TABLET, FILM COATED ORAL AT BEDTIME
Refills: 0 | Status: DISCONTINUED | OUTPATIENT
Start: 2025-04-02 | End: 2025-04-05

## 2025-04-02 RX ORDER — MEMANTINE HYDROCHLORIDE 21 MG/1
10 CAPSULE, EXTENDED RELEASE ORAL
Refills: 0 | Status: DISCONTINUED | OUTPATIENT
Start: 2025-04-02 | End: 2025-04-05

## 2025-04-02 RX ORDER — ACETAMINOPHEN 500 MG/5ML
975 LIQUID (ML) ORAL EVERY 8 HOURS
Refills: 0 | Status: DISCONTINUED | OUTPATIENT
Start: 2025-04-02 | End: 2025-04-05

## 2025-04-02 RX ORDER — ONDANSETRON HCL/PF 4 MG/2 ML
4 VIAL (ML) INJECTION EVERY 6 HOURS
Refills: 0 | Status: DISCONTINUED | OUTPATIENT
Start: 2025-04-02 | End: 2025-04-05

## 2025-04-02 RX ORDER — DEXTROSE 50 % IN WATER 50 %
25 SYRINGE (ML) INTRAVENOUS ONCE
Refills: 0 | Status: DISCONTINUED | OUTPATIENT
Start: 2025-04-02 | End: 2025-04-05

## 2025-04-02 RX ORDER — GABAPENTIN 400 MG/1
300 CAPSULE ORAL THREE TIMES A DAY
Refills: 0 | Status: DISCONTINUED | OUTPATIENT
Start: 2025-04-02 | End: 2025-04-05

## 2025-04-02 RX ORDER — ASPIRIN 325 MG
325 TABLET ORAL DAILY
Refills: 0 | Status: DISCONTINUED | OUTPATIENT
Start: 2025-04-03 | End: 2025-04-05

## 2025-04-02 RX ORDER — VIBEGRON 75 MG/1
1 TABLET, FILM COATED ORAL
Refills: 0 | DISCHARGE

## 2025-04-02 RX ORDER — TIRZEPATIDE 7.5 MG/.5ML
7.5 INJECTION, SOLUTION SUBCUTANEOUS
Refills: 0 | DISCHARGE

## 2025-04-02 RX ORDER — EZETIMIBE 10 MG/1
10 TABLET ORAL DAILY
Refills: 0 | Status: DISCONTINUED | OUTPATIENT
Start: 2025-04-02 | End: 2025-04-05

## 2025-04-02 RX ORDER — GLUCAGON 3 MG/1
1 POWDER NASAL ONCE
Refills: 0 | Status: DISCONTINUED | OUTPATIENT
Start: 2025-04-02 | End: 2025-04-05

## 2025-04-02 RX ORDER — SENNA 187 MG
2 TABLET ORAL AT BEDTIME
Refills: 0 | Status: DISCONTINUED | OUTPATIENT
Start: 2025-04-02 | End: 2025-04-05

## 2025-04-02 RX ORDER — HYPROMELLOSE 0.4 %
1 DROPS OPHTHALMIC (EYE)
Refills: 0 | DISCHARGE

## 2025-04-02 RX ORDER — DEXAMETHASONE 0.5 MG/1
4 TABLET ORAL EVERY 6 HOURS
Refills: 0 | Status: COMPLETED | OUTPATIENT
Start: 2025-04-02 | End: 2025-04-03

## 2025-04-02 RX ORDER — MEMANTINE HYDROCHLORIDE 21 MG/1
1 CAPSULE, EXTENDED RELEASE ORAL
Refills: 0 | DISCHARGE

## 2025-04-02 RX ORDER — CEFAZOLIN SODIUM IN 0.9 % NACL 3 G/100 ML
2000 INTRAVENOUS SOLUTION, PIGGYBACK (ML) INTRAVENOUS
Refills: 0 | Status: COMPLETED | OUTPATIENT
Start: 2025-04-02 | End: 2025-04-03

## 2025-04-02 RX ORDER — EMPAGLIFLOZIN 25 MG/1
1 TABLET, FILM COATED ORAL
Refills: 0 | DISCHARGE

## 2025-04-02 RX ORDER — HYDROMORPHONE/SOD CHLOR,ISO/PF 2 MG/10 ML
0.5 SYRINGE (ML) INJECTION
Refills: 0 | Status: DISCONTINUED | OUTPATIENT
Start: 2025-04-02 | End: 2025-04-02

## 2025-04-02 RX ORDER — LANOLIN/MINERAL OIL/PETROLATUM
1 OINTMENT (GRAM) OPHTHALMIC (EYE)
Refills: 0 | Status: DISCONTINUED | OUTPATIENT
Start: 2025-04-02 | End: 2025-04-05

## 2025-04-02 RX ORDER — CELECOXIB 50 MG/1
200 CAPSULE ORAL ONCE
Refills: 0 | Status: COMPLETED | OUTPATIENT
Start: 2025-04-02 | End: 2025-04-02

## 2025-04-02 RX ORDER — CARVEDILOL 3.12 MG/1
1 TABLET, FILM COATED ORAL
Refills: 0 | DISCHARGE

## 2025-04-02 RX ORDER — SODIUM CHLORIDE 9 G/1000ML
1000 INJECTION, SOLUTION INTRAVENOUS
Refills: 0 | Status: DISCONTINUED | OUTPATIENT
Start: 2025-04-02 | End: 2025-04-02

## 2025-04-02 RX ORDER — FENTANYL CITRATE-0.9 % NACL/PF 100MCG/2ML
25 SYRINGE (ML) INTRAVENOUS
Refills: 0 | Status: DISCONTINUED | OUTPATIENT
Start: 2025-04-02 | End: 2025-04-02

## 2025-04-02 RX ORDER — MAGNESIUM, ALUMINUM HYDROXIDE 200-200 MG
30 TABLET,CHEWABLE ORAL EVERY 12 HOURS
Refills: 0 | Status: DISCONTINUED | OUTPATIENT
Start: 2025-04-02 | End: 2025-04-05

## 2025-04-02 RX ORDER — CARVEDILOL 3.12 MG/1
6.25 TABLET, FILM COATED ORAL EVERY 12 HOURS
Refills: 0 | Status: DISCONTINUED | OUTPATIENT
Start: 2025-04-02 | End: 2025-04-05

## 2025-04-02 RX ORDER — CITALOPRAM 20 MG/1
1 TABLET ORAL
Refills: 0 | DISCHARGE

## 2025-04-02 RX ORDER — DEXTROSE 50 % IN WATER 50 %
15 SYRINGE (ML) INTRAVENOUS ONCE
Refills: 0 | Status: DISCONTINUED | OUTPATIENT
Start: 2025-04-02 | End: 2025-04-05

## 2025-04-02 RX ORDER — APREPITANT 40 MG/1
40 CAPSULE ORAL ONCE
Refills: 0 | Status: COMPLETED | OUTPATIENT
Start: 2025-04-02 | End: 2025-04-02

## 2025-04-02 RX ORDER — GABAPENTIN 400 MG/1
0 CAPSULE ORAL
Refills: 0 | DISCHARGE

## 2025-04-02 RX ORDER — OXYCODONE HYDROCHLORIDE 30 MG/1
5 TABLET ORAL
Refills: 0 | Status: DISCONTINUED | OUTPATIENT
Start: 2025-04-02 | End: 2025-04-05

## 2025-04-02 RX ORDER — ACETAMINOPHEN 500 MG/5ML
975 LIQUID (ML) ORAL ONCE
Refills: 0 | Status: COMPLETED | OUTPATIENT
Start: 2025-04-02 | End: 2025-04-02

## 2025-04-02 RX ORDER — MELATONIN 5 MG
3 TABLET ORAL AT BEDTIME
Refills: 0 | Status: DISCONTINUED | OUTPATIENT
Start: 2025-04-02 | End: 2025-04-05

## 2025-04-02 RX ORDER — DEXTROSE 50 % IN WATER 50 %
12.5 SYRINGE (ML) INTRAVENOUS ONCE
Refills: 0 | Status: DISCONTINUED | OUTPATIENT
Start: 2025-04-02 | End: 2025-04-05

## 2025-04-02 RX ORDER — ONDANSETRON HCL/PF 4 MG/2 ML
4 VIAL (ML) INJECTION ONCE
Refills: 0 | Status: DISCONTINUED | OUTPATIENT
Start: 2025-04-02 | End: 2025-04-02

## 2025-04-02 RX ORDER — OXYCODONE HYDROCHLORIDE 30 MG/1
10 TABLET ORAL
Refills: 0 | Status: DISCONTINUED | OUTPATIENT
Start: 2025-04-02 | End: 2025-04-05

## 2025-04-02 RX ORDER — LOSARTAN POTASSIUM 100 MG/1
50 TABLET, FILM COATED ORAL DAILY
Refills: 0 | Status: DISCONTINUED | OUTPATIENT
Start: 2025-04-03 | End: 2025-04-05

## 2025-04-02 RX ORDER — INSULIN LISPRO 100 U/ML
INJECTION, SOLUTION INTRAVENOUS; SUBCUTANEOUS AT BEDTIME
Refills: 0 | Status: DISCONTINUED | OUTPATIENT
Start: 2025-04-02 | End: 2025-04-02

## 2025-04-02 RX ORDER — CITALOPRAM 20 MG/1
10 TABLET ORAL DAILY
Refills: 0 | Status: DISCONTINUED | OUTPATIENT
Start: 2025-04-02 | End: 2025-04-05

## 2025-04-02 RX ORDER — INSULIN LISPRO 100 U/ML
INJECTION, SOLUTION INTRAVENOUS; SUBCUTANEOUS AT BEDTIME
Refills: 0 | Status: DISCONTINUED | OUTPATIENT
Start: 2025-04-02 | End: 2025-04-05

## 2025-04-02 RX ORDER — LOSARTAN POTASSIUM 100 MG/1
1 TABLET, FILM COATED ORAL
Refills: 0 | DISCHARGE

## 2025-04-02 RX ORDER — EZETIMIBE 10 MG/1
1 TABLET ORAL
Refills: 0 | DISCHARGE

## 2025-04-02 RX ORDER — INSULIN LISPRO 100 U/ML
INJECTION, SOLUTION INTRAVENOUS; SUBCUTANEOUS
Refills: 0 | Status: DISCONTINUED | OUTPATIENT
Start: 2025-04-02 | End: 2025-04-05

## 2025-04-02 RX ORDER — MAGNESIUM HYDROXIDE 400 MG/5ML
30 SUSPENSION ORAL EVERY 12 HOURS
Refills: 0 | Status: DISCONTINUED | OUTPATIENT
Start: 2025-04-02 | End: 2025-04-05

## 2025-04-02 RX ADMIN — CARVEDILOL 6.25 MILLIGRAM(S): 3.12 TABLET, FILM COATED ORAL at 19:52

## 2025-04-02 RX ADMIN — OXYCODONE HYDROCHLORIDE 5 MILLIGRAM(S): 30 TABLET ORAL at 21:50

## 2025-04-02 RX ADMIN — SODIUM CHLORIDE 75 MILLILITER(S): 9 INJECTION, SOLUTION INTRAVENOUS at 19:52

## 2025-04-02 RX ADMIN — Medication 2000 MILLIGRAM(S): at 20:48

## 2025-04-02 RX ADMIN — MEMANTINE HYDROCHLORIDE 10 MILLIGRAM(S): 21 CAPSULE, EXTENDED RELEASE ORAL at 21:48

## 2025-04-02 RX ADMIN — OXYBUTYNIN CHLORIDE 5 MILLIGRAM(S): 5 TABLET, FILM COATED, EXTENDED RELEASE ORAL at 19:52

## 2025-04-02 RX ADMIN — SODIUM CHLORIDE 75 MILLILITER(S): 9 INJECTION, SOLUTION INTRAVENOUS at 15:52

## 2025-04-02 RX ADMIN — Medication 1 APPLICATION(S): at 10:40

## 2025-04-02 RX ADMIN — OXYCODONE HYDROCHLORIDE 5 MILLIGRAM(S): 30 TABLET ORAL at 20:50

## 2025-04-02 RX ADMIN — Medication 975 MILLIGRAM(S): at 21:47

## 2025-04-02 RX ADMIN — Medication 975 MILLIGRAM(S): at 10:30

## 2025-04-02 RX ADMIN — ROSUVASTATIN CALCIUM 40 MILLIGRAM(S): 20 TABLET, FILM COATED ORAL at 21:47

## 2025-04-02 RX ADMIN — METHOCARBAMOL 750 MILLIGRAM(S): 500 TABLET, FILM COATED ORAL at 21:47

## 2025-04-02 RX ADMIN — CELECOXIB 200 MILLIGRAM(S): 50 CAPSULE ORAL at 10:30

## 2025-04-02 RX ADMIN — DEXAMETHASONE 4 MILLIGRAM(S): 0.5 TABLET ORAL at 21:47

## 2025-04-02 RX ADMIN — SODIUM CHLORIDE 75 MILLILITER(S): 9 INJECTION, SOLUTION INTRAVENOUS at 21:49

## 2025-04-02 RX ADMIN — APREPITANT 40 MILLIGRAM(S): 40 CAPSULE ORAL at 10:29

## 2025-04-02 RX ADMIN — Medication 2 TABLET(S): at 21:46

## 2025-04-02 RX ADMIN — GABAPENTIN 300 MILLIGRAM(S): 400 CAPSULE ORAL at 21:46

## 2025-04-02 RX ADMIN — METHOCARBAMOL 750 MILLIGRAM(S): 500 TABLET, FILM COATED ORAL at 16:52

## 2025-04-03 LAB
GLUCOSE BLDC GLUCOMTR-MCNC: 171 MG/DL — HIGH (ref 70–99)
GLUCOSE BLDC GLUCOMTR-MCNC: 179 MG/DL — HIGH (ref 70–99)
GLUCOSE BLDC GLUCOMTR-MCNC: 185 MG/DL — HIGH (ref 70–99)
GLUCOSE BLDC GLUCOMTR-MCNC: 243 MG/DL — HIGH (ref 70–99)

## 2025-04-03 PROCEDURE — 93010 ELECTROCARDIOGRAM REPORT: CPT

## 2025-04-03 PROCEDURE — 99222 1ST HOSP IP/OBS MODERATE 55: CPT

## 2025-04-03 RX ORDER — TAMSULOSIN HYDROCHLORIDE 0.4 MG/1
0.4 CAPSULE ORAL AT BEDTIME
Refills: 0 | Status: DISCONTINUED | OUTPATIENT
Start: 2025-04-03 | End: 2025-04-05

## 2025-04-03 RX ADMIN — OXYCODONE HYDROCHLORIDE 10 MILLIGRAM(S): 30 TABLET ORAL at 04:32

## 2025-04-03 RX ADMIN — CELECOXIB 200 MILLIGRAM(S): 50 CAPSULE ORAL at 05:03

## 2025-04-03 RX ADMIN — ROSUVASTATIN CALCIUM 40 MILLIGRAM(S): 20 TABLET, FILM COATED ORAL at 21:03

## 2025-04-03 RX ADMIN — METHOCARBAMOL 750 MILLIGRAM(S): 500 TABLET, FILM COATED ORAL at 21:03

## 2025-04-03 RX ADMIN — Medication 975 MILLIGRAM(S): at 05:04

## 2025-04-03 RX ADMIN — EZETIMIBE 10 MILLIGRAM(S): 10 TABLET ORAL at 12:20

## 2025-04-03 RX ADMIN — DEXAMETHASONE 4 MILLIGRAM(S): 0.5 TABLET ORAL at 15:05

## 2025-04-03 RX ADMIN — Medication 975 MILLIGRAM(S): at 15:36

## 2025-04-03 RX ADMIN — DEXAMETHASONE 4 MILLIGRAM(S): 0.5 TABLET ORAL at 10:50

## 2025-04-03 RX ADMIN — CITALOPRAM 10 MILLIGRAM(S): 20 TABLET ORAL at 12:20

## 2025-04-03 RX ADMIN — Medication 325 MILLIGRAM(S): at 12:20

## 2025-04-03 RX ADMIN — Medication 2000 MILLIGRAM(S): at 03:53

## 2025-04-03 RX ADMIN — INSULIN LISPRO 2: 100 INJECTION, SOLUTION INTRAVENOUS; SUBCUTANEOUS at 18:28

## 2025-04-03 RX ADMIN — OXYBUTYNIN CHLORIDE 5 MILLIGRAM(S): 5 TABLET, FILM COATED, EXTENDED RELEASE ORAL at 18:29

## 2025-04-03 RX ADMIN — CELECOXIB 200 MILLIGRAM(S): 50 CAPSULE ORAL at 18:29

## 2025-04-03 RX ADMIN — Medication 1 DROP(S): at 18:30

## 2025-04-03 RX ADMIN — MEMANTINE HYDROCHLORIDE 10 MILLIGRAM(S): 21 CAPSULE, EXTENDED RELEASE ORAL at 05:05

## 2025-04-03 RX ADMIN — Medication 975 MILLIGRAM(S): at 15:06

## 2025-04-03 RX ADMIN — MEMANTINE HYDROCHLORIDE 10 MILLIGRAM(S): 21 CAPSULE, EXTENDED RELEASE ORAL at 18:29

## 2025-04-03 RX ADMIN — METHOCARBAMOL 750 MILLIGRAM(S): 500 TABLET, FILM COATED ORAL at 05:05

## 2025-04-03 RX ADMIN — Medication 975 MILLIGRAM(S): at 22:03

## 2025-04-03 RX ADMIN — METHOCARBAMOL 750 MILLIGRAM(S): 500 TABLET, FILM COATED ORAL at 15:06

## 2025-04-03 RX ADMIN — INSULIN LISPRO 4: 100 INJECTION, SOLUTION INTRAVENOUS; SUBCUTANEOUS at 12:19

## 2025-04-03 RX ADMIN — Medication 975 MILLIGRAM(S): at 06:04

## 2025-04-03 RX ADMIN — Medication 40 MILLIGRAM(S): at 05:04

## 2025-04-03 RX ADMIN — TAMSULOSIN HYDROCHLORIDE 0.4 MILLIGRAM(S): 0.4 CAPSULE ORAL at 21:32

## 2025-04-03 RX ADMIN — LOSARTAN POTASSIUM 50 MILLIGRAM(S): 100 TABLET, FILM COATED ORAL at 05:05

## 2025-04-03 RX ADMIN — GABAPENTIN 300 MILLIGRAM(S): 400 CAPSULE ORAL at 05:05

## 2025-04-03 RX ADMIN — OXYCODONE HYDROCHLORIDE 10 MILLIGRAM(S): 30 TABLET ORAL at 03:32

## 2025-04-03 RX ADMIN — CARVEDILOL 6.25 MILLIGRAM(S): 3.12 TABLET, FILM COATED ORAL at 05:05

## 2025-04-03 RX ADMIN — Medication 1 DROP(S): at 05:06

## 2025-04-03 RX ADMIN — GABAPENTIN 300 MILLIGRAM(S): 400 CAPSULE ORAL at 15:05

## 2025-04-03 RX ADMIN — INSULIN LISPRO 2: 100 INJECTION, SOLUTION INTRAVENOUS; SUBCUTANEOUS at 08:21

## 2025-04-03 RX ADMIN — CELECOXIB 200 MILLIGRAM(S): 50 CAPSULE ORAL at 06:04

## 2025-04-03 RX ADMIN — Medication 975 MILLIGRAM(S): at 21:03

## 2025-04-03 RX ADMIN — OXYBUTYNIN CHLORIDE 5 MILLIGRAM(S): 5 TABLET, FILM COATED, EXTENDED RELEASE ORAL at 05:05

## 2025-04-03 RX ADMIN — CELECOXIB 200 MILLIGRAM(S): 50 CAPSULE ORAL at 19:07

## 2025-04-03 RX ADMIN — GABAPENTIN 300 MILLIGRAM(S): 400 CAPSULE ORAL at 21:03

## 2025-04-04 LAB
GLUCOSE BLDC GLUCOMTR-MCNC: 142 MG/DL — HIGH (ref 70–99)
GLUCOSE BLDC GLUCOMTR-MCNC: 150 MG/DL — HIGH (ref 70–99)
GLUCOSE BLDC GLUCOMTR-MCNC: 166 MG/DL — HIGH (ref 70–99)
GLUCOSE BLDC GLUCOMTR-MCNC: 214 MG/DL — HIGH (ref 70–99)

## 2025-04-04 PROCEDURE — 99232 SBSQ HOSP IP/OBS MODERATE 35: CPT

## 2025-04-04 RX ADMIN — CELECOXIB 200 MILLIGRAM(S): 50 CAPSULE ORAL at 18:32

## 2025-04-04 RX ADMIN — Medication 975 MILLIGRAM(S): at 22:06

## 2025-04-04 RX ADMIN — GABAPENTIN 300 MILLIGRAM(S): 400 CAPSULE ORAL at 21:06

## 2025-04-04 RX ADMIN — Medication 975 MILLIGRAM(S): at 06:01

## 2025-04-04 RX ADMIN — OXYBUTYNIN CHLORIDE 5 MILLIGRAM(S): 5 TABLET, FILM COATED, EXTENDED RELEASE ORAL at 05:02

## 2025-04-04 RX ADMIN — OXYCODONE HYDROCHLORIDE 5 MILLIGRAM(S): 30 TABLET ORAL at 08:23

## 2025-04-04 RX ADMIN — CELECOXIB 200 MILLIGRAM(S): 50 CAPSULE ORAL at 17:47

## 2025-04-04 RX ADMIN — INSULIN LISPRO 4: 100 INJECTION, SOLUTION INTRAVENOUS; SUBCUTANEOUS at 17:47

## 2025-04-04 RX ADMIN — Medication 975 MILLIGRAM(S): at 14:33

## 2025-04-04 RX ADMIN — MEMANTINE HYDROCHLORIDE 10 MILLIGRAM(S): 21 CAPSULE, EXTENDED RELEASE ORAL at 17:47

## 2025-04-04 RX ADMIN — Medication 325 MILLIGRAM(S): at 13:33

## 2025-04-04 RX ADMIN — GABAPENTIN 300 MILLIGRAM(S): 400 CAPSULE ORAL at 05:01

## 2025-04-04 RX ADMIN — INSULIN LISPRO 2: 100 INJECTION, SOLUTION INTRAVENOUS; SUBCUTANEOUS at 12:15

## 2025-04-04 RX ADMIN — Medication 975 MILLIGRAM(S): at 05:01

## 2025-04-04 RX ADMIN — Medication 1 DROP(S): at 05:02

## 2025-04-04 RX ADMIN — OXYCODONE HYDROCHLORIDE 5 MILLIGRAM(S): 30 TABLET ORAL at 09:23

## 2025-04-04 RX ADMIN — Medication 40 MILLIGRAM(S): at 05:01

## 2025-04-04 RX ADMIN — Medication 975 MILLIGRAM(S): at 13:33

## 2025-04-04 RX ADMIN — ROSUVASTATIN CALCIUM 40 MILLIGRAM(S): 20 TABLET, FILM COATED ORAL at 21:06

## 2025-04-04 RX ADMIN — CITALOPRAM 10 MILLIGRAM(S): 20 TABLET ORAL at 13:33

## 2025-04-04 RX ADMIN — CELECOXIB 200 MILLIGRAM(S): 50 CAPSULE ORAL at 05:00

## 2025-04-04 RX ADMIN — TAMSULOSIN HYDROCHLORIDE 0.4 MILLIGRAM(S): 0.4 CAPSULE ORAL at 21:06

## 2025-04-04 RX ADMIN — CARVEDILOL 6.25 MILLIGRAM(S): 3.12 TABLET, FILM COATED ORAL at 05:00

## 2025-04-04 RX ADMIN — METHOCARBAMOL 750 MILLIGRAM(S): 500 TABLET, FILM COATED ORAL at 05:02

## 2025-04-04 RX ADMIN — MEMANTINE HYDROCHLORIDE 10 MILLIGRAM(S): 21 CAPSULE, EXTENDED RELEASE ORAL at 05:01

## 2025-04-04 RX ADMIN — METHOCARBAMOL 750 MILLIGRAM(S): 500 TABLET, FILM COATED ORAL at 21:06

## 2025-04-04 RX ADMIN — Medication 975 MILLIGRAM(S): at 21:06

## 2025-04-04 RX ADMIN — METHOCARBAMOL 750 MILLIGRAM(S): 500 TABLET, FILM COATED ORAL at 13:33

## 2025-04-04 RX ADMIN — CARVEDILOL 6.25 MILLIGRAM(S): 3.12 TABLET, FILM COATED ORAL at 17:47

## 2025-04-04 RX ADMIN — CELECOXIB 200 MILLIGRAM(S): 50 CAPSULE ORAL at 06:01

## 2025-04-04 RX ADMIN — GABAPENTIN 300 MILLIGRAM(S): 400 CAPSULE ORAL at 13:33

## 2025-04-04 RX ADMIN — EZETIMIBE 10 MILLIGRAM(S): 10 TABLET ORAL at 13:33

## 2025-04-04 RX ADMIN — Medication 2 TABLET(S): at 21:07

## 2025-04-04 RX ADMIN — OXYBUTYNIN CHLORIDE 5 MILLIGRAM(S): 5 TABLET, FILM COATED, EXTENDED RELEASE ORAL at 17:47

## 2025-04-04 RX ADMIN — LOSARTAN POTASSIUM 50 MILLIGRAM(S): 100 TABLET, FILM COATED ORAL at 05:02

## 2025-04-05 VITALS
TEMPERATURE: 98 F | DIASTOLIC BLOOD PRESSURE: 78 MMHG | RESPIRATION RATE: 18 BRPM | OXYGEN SATURATION: 96 % | HEART RATE: 61 BPM | SYSTOLIC BLOOD PRESSURE: 144 MMHG

## 2025-04-05 LAB — GLUCOSE BLDC GLUCOMTR-MCNC: 111 MG/DL — HIGH (ref 70–99)

## 2025-04-05 PROCEDURE — 82962 GLUCOSE BLOOD TEST: CPT

## 2025-04-05 PROCEDURE — 93005 ELECTROCARDIOGRAM TRACING: CPT

## 2025-04-05 PROCEDURE — C1763: CPT

## 2025-04-05 PROCEDURE — 97167 OT EVAL HIGH COMPLEX 60 MIN: CPT

## 2025-04-05 PROCEDURE — 97530 THERAPEUTIC ACTIVITIES: CPT

## 2025-04-05 PROCEDURE — 99232 SBSQ HOSP IP/OBS MODERATE 35: CPT

## 2025-04-05 PROCEDURE — 76000 FLUOROSCOPY <1 HR PHYS/QHP: CPT

## 2025-04-05 PROCEDURE — 97163 PT EVAL HIGH COMPLEX 45 MIN: CPT

## 2025-04-05 PROCEDURE — C1713: CPT

## 2025-04-05 PROCEDURE — 97535 SELF CARE MNGMENT TRAINING: CPT

## 2025-04-05 PROCEDURE — 36415 COLL VENOUS BLD VENIPUNCTURE: CPT

## 2025-04-05 PROCEDURE — C1889: CPT

## 2025-04-05 RX ORDER — SENNOSIDES, DOCUSATE SODIUM 8.6; 5 MG/1; MG/1
2 TABLET ORAL
Qty: 14 | Refills: 0
Start: 2025-04-05 | End: 2025-04-11

## 2025-04-05 RX ORDER — OXYCODONE HYDROCHLORIDE 30 MG/1
1 TABLET ORAL
Qty: 28 | Refills: 0
Start: 2025-04-05 | End: 2025-04-11

## 2025-04-05 RX ORDER — ASPIRIN 325 MG
1 TABLET ORAL
Qty: 28 | Refills: 0
Start: 2025-04-05 | End: 2025-05-02

## 2025-04-05 RX ORDER — METHOCARBAMOL 500 MG/1
1 TABLET, FILM COATED ORAL
Qty: 9 | Refills: 0
Start: 2025-04-05 | End: 2025-04-07

## 2025-04-05 RX ORDER — NALOXONE HYDROCHLORIDE 0.4 MG/ML
4 INJECTION, SOLUTION INTRAMUSCULAR; INTRAVENOUS; SUBCUTANEOUS
Qty: 1 | Refills: 0
Start: 2025-04-05

## 2025-04-05 RX ORDER — ACETAMINOPHEN 500 MG/5ML
3 LIQUID (ML) ORAL
Qty: 0 | Refills: 0 | DISCHARGE
Start: 2025-04-05

## 2025-04-05 RX ADMIN — Medication 975 MILLIGRAM(S): at 05:16

## 2025-04-05 RX ADMIN — Medication 975 MILLIGRAM(S): at 06:16

## 2025-04-05 RX ADMIN — MEMANTINE HYDROCHLORIDE 10 MILLIGRAM(S): 21 CAPSULE, EXTENDED RELEASE ORAL at 05:16

## 2025-04-05 RX ADMIN — OXYBUTYNIN CHLORIDE 5 MILLIGRAM(S): 5 TABLET, FILM COATED, EXTENDED RELEASE ORAL at 05:17

## 2025-04-05 RX ADMIN — GABAPENTIN 300 MILLIGRAM(S): 400 CAPSULE ORAL at 05:16

## 2025-04-05 RX ADMIN — CARVEDILOL 6.25 MILLIGRAM(S): 3.12 TABLET, FILM COATED ORAL at 05:16

## 2025-04-05 RX ADMIN — METHOCARBAMOL 750 MILLIGRAM(S): 500 TABLET, FILM COATED ORAL at 05:16

## 2025-04-05 RX ADMIN — CELECOXIB 200 MILLIGRAM(S): 50 CAPSULE ORAL at 06:16

## 2025-04-05 RX ADMIN — CELECOXIB 200 MILLIGRAM(S): 50 CAPSULE ORAL at 05:17

## 2025-04-05 RX ADMIN — LOSARTAN POTASSIUM 50 MILLIGRAM(S): 100 TABLET, FILM COATED ORAL at 05:17

## 2025-04-05 RX ADMIN — Medication 40 MILLIGRAM(S): at 05:15

## 2025-04-07 ENCOUNTER — NON-APPOINTMENT (OUTPATIENT)
Age: 70
End: 2025-04-07

## 2025-04-08 ENCOUNTER — NON-APPOINTMENT (OUTPATIENT)
Age: 70
End: 2025-04-08

## 2025-04-09 ENCOUNTER — NON-APPOINTMENT (OUTPATIENT)
Age: 70
End: 2025-04-09

## 2025-04-11 ENCOUNTER — APPOINTMENT (OUTPATIENT)
Dept: ORTHOPEDIC SURGERY | Facility: CLINIC | Age: 70
End: 2025-04-11
Payer: MEDICARE

## 2025-04-11 DIAGNOSIS — Z98.1 ARTHRODESIS STATUS: ICD-10-CM

## 2025-04-11 PROBLEM — G45.9 TRANSIENT CEREBRAL ISCHEMIC ATTACK, UNSPECIFIED: Chronic | Status: ACTIVE | Noted: 2025-03-28

## 2025-04-11 PROBLEM — I10 ESSENTIAL (PRIMARY) HYPERTENSION: Chronic | Status: ACTIVE | Noted: 2025-03-28

## 2025-04-11 PROBLEM — E11.9 TYPE 2 DIABETES MELLITUS WITHOUT COMPLICATIONS: Chronic | Status: ACTIVE | Noted: 2025-03-28

## 2025-04-11 PROBLEM — Q21.12 PATENT FORAMEN OVALE: Chronic | Status: ACTIVE | Noted: 2025-03-28

## 2025-04-11 PROBLEM — G47.33 OBSTRUCTIVE SLEEP APNEA (ADULT) (PEDIATRIC): Chronic | Status: ACTIVE | Noted: 2025-03-28

## 2025-04-11 PROBLEM — M50.00 CERVICAL DISC DISORDER WITH MYELOPATHY, UNSPECIFIED CERVICAL REGION: Chronic | Status: ACTIVE | Noted: 2025-03-28

## 2025-04-11 PROBLEM — I21.9 ACUTE MYOCARDIAL INFARCTION, UNSPECIFIED: Chronic | Status: ACTIVE | Noted: 2025-03-28

## 2025-04-11 PROBLEM — G30.9 ALZHEIMER'S DISEASE, UNSPECIFIED: Chronic | Status: ACTIVE | Noted: 2025-03-28

## 2025-04-11 PROBLEM — Z87.19 PERSONAL HISTORY OF OTHER DISEASES OF THE DIGESTIVE SYSTEM: Chronic | Status: ACTIVE | Noted: 2025-03-28

## 2025-04-11 PROBLEM — K21.9 GASTRO-ESOPHAGEAL REFLUX DISEASE WITHOUT ESOPHAGITIS: Chronic | Status: ACTIVE | Noted: 2025-03-28

## 2025-04-11 PROCEDURE — 72040 X-RAY EXAM NECK SPINE 2-3 VW: CPT

## 2025-04-11 PROCEDURE — 99024 POSTOP FOLLOW-UP VISIT: CPT

## 2025-04-11 RX ORDER — SULFAMETHOXAZOLE AND TRIMETHOPRIM 800; 160 MG/1; MG/1
800-160 TABLET ORAL
Qty: 40 | Refills: 0 | Status: ACTIVE | COMMUNITY
Start: 2025-04-11 | End: 1900-01-01

## 2025-04-12 ENCOUNTER — INPATIENT (INPATIENT)
Facility: HOSPITAL | Age: 70
LOS: 2 days | Discharge: HOME | DRG: 29 | End: 2025-04-15
Attending: STUDENT IN AN ORGANIZED HEALTH CARE EDUCATION/TRAINING PROGRAM | Admitting: STUDENT IN AN ORGANIZED HEALTH CARE EDUCATION/TRAINING PROGRAM
Payer: MEDICARE

## 2025-04-12 VITALS
DIASTOLIC BLOOD PRESSURE: 64 MMHG | WEIGHT: 236.34 LBS | SYSTOLIC BLOOD PRESSURE: 103 MMHG | HEART RATE: 73 BPM | RESPIRATION RATE: 18 BRPM | TEMPERATURE: 98 F | HEIGHT: 70 IN | OXYGEN SATURATION: 92 %

## 2025-04-12 DIAGNOSIS — Z87.74 PERSONAL HISTORY OF (CORRECTED) CONGENITAL MALFORMATIONS OF HEART AND CIRCULATORY SYSTEM: Chronic | ICD-10-CM

## 2025-04-12 DIAGNOSIS — Z98.890 OTHER SPECIFIED POSTPROCEDURAL STATES: Chronic | ICD-10-CM

## 2025-04-12 LAB
ALBUMIN SERPL ELPH-MCNC: 3.7 G/DL — SIGNIFICANT CHANGE UP (ref 3.3–5.2)
ALP SERPL-CCNC: 54 U/L — SIGNIFICANT CHANGE UP (ref 40–120)
ALT FLD-CCNC: 25 U/L — SIGNIFICANT CHANGE UP
ANION GAP SERPL CALC-SCNC: 14 MMOL/L — SIGNIFICANT CHANGE UP (ref 5–17)
AST SERPL-CCNC: 13 U/L — SIGNIFICANT CHANGE UP
BILIRUB SERPL-MCNC: 0.6 MG/DL — SIGNIFICANT CHANGE UP (ref 0.4–2)
BUN SERPL-MCNC: 20.2 MG/DL — HIGH (ref 8–20)
CALCIUM SERPL-MCNC: 8.7 MG/DL — SIGNIFICANT CHANGE UP (ref 8.4–10.5)
CHLORIDE SERPL-SCNC: 97 MMOL/L — SIGNIFICANT CHANGE UP (ref 96–108)
CO2 SERPL-SCNC: 23 MMOL/L — SIGNIFICANT CHANGE UP (ref 22–29)
CREAT SERPL-MCNC: 1.29 MG/DL — SIGNIFICANT CHANGE UP (ref 0.5–1.3)
EGFR: 60 ML/MIN/1.73M2 — SIGNIFICANT CHANGE UP
EGFR: 60 ML/MIN/1.73M2 — SIGNIFICANT CHANGE UP
GLUCOSE SERPL-MCNC: 169 MG/DL — HIGH (ref 70–99)
HCT VFR BLD CALC: 43 % — SIGNIFICANT CHANGE UP (ref 39–50)
HGB BLD-MCNC: 13.6 G/DL — SIGNIFICANT CHANGE UP (ref 13–17)
MCHC RBC-ENTMCNC: 27.9 PG — SIGNIFICANT CHANGE UP (ref 27–34)
MCHC RBC-ENTMCNC: 31.6 G/DL — LOW (ref 32–36)
MCV RBC AUTO: 88.1 FL — SIGNIFICANT CHANGE UP (ref 80–100)
NRBC # BLD AUTO: 0 K/UL — SIGNIFICANT CHANGE UP (ref 0–0)
NRBC # FLD: 0 K/UL — SIGNIFICANT CHANGE UP (ref 0–0)
NRBC BLD AUTO-RTO: 0 /100 WBCS — SIGNIFICANT CHANGE UP (ref 0–0)
PLATELET # BLD AUTO: 204 K/UL — SIGNIFICANT CHANGE UP (ref 150–400)
PMV BLD: 9 FL — SIGNIFICANT CHANGE UP (ref 7–13)
POTASSIUM SERPL-MCNC: 3.9 MMOL/L — SIGNIFICANT CHANGE UP (ref 3.5–5.3)
POTASSIUM SERPL-SCNC: 3.9 MMOL/L — SIGNIFICANT CHANGE UP (ref 3.5–5.3)
PROT SERPL-MCNC: 6.9 G/DL — SIGNIFICANT CHANGE UP (ref 6.6–8.7)
RBC # BLD: 4.88 M/UL — SIGNIFICANT CHANGE UP (ref 4.2–5.8)
RBC # FLD: 14.9 % — HIGH (ref 10.3–14.5)
SODIUM SERPL-SCNC: 134 MMOL/L — LOW (ref 135–145)
WBC # BLD: 12.43 K/UL — HIGH (ref 3.8–10.5)
WBC # FLD AUTO: 12.43 K/UL — HIGH (ref 3.8–10.5)

## 2025-04-12 PROCEDURE — 99285 EMERGENCY DEPT VISIT HI MDM: CPT

## 2025-04-12 RX ORDER — DEXAMETHASONE 0.5 MG/1
4 TABLET ORAL EVERY 6 HOURS
Refills: 0 | Status: COMPLETED | OUTPATIENT
Start: 2025-04-12 | End: 2025-04-13

## 2025-04-12 RX ADMIN — DEXAMETHASONE 4 MILLIGRAM(S): 0.5 TABLET ORAL at 23:37

## 2025-04-12 NOTE — ED ADULT NURSE NOTE - NSFALLUNIVINTERV_ED_ALL_ED
Bed/Stretcher in lowest position, wheels locked, appropriate side rails in place/Call bell, personal items and telephone in reach/Instruct patient to call for assistance before getting out of bed/chair/stretcher/Non-slip footwear applied when patient is off stretcher/[place] to call system/Physically safe environment - no spills, clutter or unnecessary equipment/Purposeful proactive rounding/Room/bathroom lighting operational, light cord in reach

## 2025-04-12 NOTE — ED PROVIDER NOTE - PROGRESS NOTE DETAILS
[place]: Pt received in signout from Dr. Delgadillo. CT reviewed. Pt resting comfortably, in no distress. Continued mild drainage from neck noted. Will keep in observation.

## 2025-04-12 NOTE — PROGRESS NOTE ADULT - SUBJECTIVE AND OBJECTIVE BOX
JAYME DE JESUS  [id number]    SUBJECTIVE: Patient is a 69y Male s/p C5-C7 ACDF 4/2/25 presenting to the ED with complaints of increased drainage from incision and difficulty swallowing.    OBJECTIVE:   Vital Signs Last 24 Hrs  T(C): 36.9 (12 Apr 2025 22:30), Max: 36.9 (12 Apr 2025 22:30)  T(F): 98.4 (12 Apr 2025 22:30), Max: 98.4 (12 Apr 2025 22:30)  HR: 73 (12 Apr 2025 22:30) (73 - 73)  BP: 103/64 (12 Apr 2025 22:30) (103/64 - 103/64)  BP(mean): --  RR: 18 (12 Apr 2025 22:30) (18 - 18)  SpO2: 92% (12 Apr 2025 22:30) (92% - 92%)  Parameters below as of 12 Apr 2025 22:30  Patient On (Oxygen Delivery Method): room air     PHYSICAL EXAM:  Constitutional: Alert, awake  Spine:          Incision:          Motor exam:          Upper extremity                        Bi(c5)  WE(c6)  EE(c7)   FF(c8)                                                R         5/5        5/5        5/5       5/5                                               L          5/5        5/5        5/5       5/5         Lower extremity                      HF(l2)   KE(l3)    TA(l4)   EHL(l5)  GS(s1)                                                 R        5/5        5/5        5/5       5/5         5/5                                               L         5/5        5/5       5/5       5/5          5/5                                              B/L LE: warm well perfused; capillary refill <3 seconds. BCR. Calves soft NT  Sensation grossly intact to bilateral upper and lower extremities     A/P:  69y Male s/p C5-C7 ACDF 4/2/25 presenting to ED with difficulty swallowing and drainage from incision      -    JAYME DE JESUS  [id number]    SUBJECTIVE: Patient is a 69y Male s/p C5-C7 ACDF 4/2/25 presenting to the ED with complaints of increased drainage from incision and difficulty swallowing. Patient states swallowing has become more difficult since the surgery - he has been able to tolerate jello and water with discomfort. Has not been able to eat any solid foods. Denies difficulty breathing or speaking. Patient states he has been having to change his dressing every 30 minutes at home due to drainage. Denies purulent drainage; states drainage is light yellow and slightly red. Patient states he had a large "lump" over proximal incision that has been decreasing in size as drainage comes from incision. Denies upper and lower extremity weakness or pain.     OBJECTIVE:   Vital Signs Last 24 Hrs  T(C): 36.9 (12 Apr 2025 22:30), Max: 36.9 (12 Apr 2025 22:30)  T(F): 98.4 (12 Apr 2025 22:30), Max: 98.4 (12 Apr 2025 22:30)  HR: 73 (12 Apr 2025 22:30) (73 - 73)  BP: 103/64 (12 Apr 2025 22:30) (103/64 - 103/64)  BP(mean): --  RR: 18 (12 Apr 2025 22:30) (18 - 18)  SpO2: 92% (12 Apr 2025 22:30) (92% - 92%)  Parameters below as of 12 Apr 2025 22:30  Patient On (Oxygen Delivery Method): room air     PHYSICAL EXAM:  Constitutional: Alert, awake  Spine:          Incision: Clean and intact. Small amount of drainage coming from proximal incision. Drainage serous in nature.          Motor exam:          Upper extremity                        Bi(c5)  WE(c6)  EE(c7)   FF(c8)                                                R         5/5        5/5        5/5       5/5                                               L          5/5        5/5        5/5       5/5         Lower extremity                      HF(l2)   KE(l3)    TA(l4)   EHL(l5)  GS(s1)                                                 R        5/5        5/5        5/5       5/5         5/5                                               L         5/5        5/5       5/5       5/5          5/5                                              B/L LE: warm well perfused; capillary refill <3 seconds. BCR. Calves soft NT  Sensation grossly intact to bilateral upper and lower extremities     A/P:  69y Male s/p C5-C7 ACDF 4/2/25 presenting to ED with difficulty swallowing and drainage from incision; here to rule out CSF leak   -   Case discussed with Dr. Bass who gave plan   -   Follow up CT of the neck   -   4x4 applied to incision - patient educated to keep incision covered at all times but may change out 4x4s as they become saturated   -   Decadron ordered - monitor for symptom improvement; 2 doses ordered as patient is a diabetic. Possible sugar check in AM to ensure blood sugar isn't too high  JAYME D EJESUS  [id number]    SUBJECTIVE: Patient is a 69y Male s/p C5-C7 ACDF 4/2/25 presenting to the ED with complaints of increased drainage from incision and difficulty swallowing. Patient states swallowing has become more difficult since the surgery - he has been able to tolerate jello and water with discomfort. Has not been able to eat any solid foods. Denies difficulty breathing or speaking. Denies increased drooling Patient states he has been having to change his dressing every 30 minutes at home due to drainage. Denies purulent drainage; states drainage is light yellow and slightly red. Patient states he had a large "lump" over proximal incision that has been decreasing in size as drainage comes from incision. Denies upper and lower extremity weakness or pain.     OBJECTIVE:   Vital Signs Last 24 Hrs  T(C): 36.9 (12 Apr 2025 22:30), Max: 36.9 (12 Apr 2025 22:30)  T(F): 98.4 (12 Apr 2025 22:30), Max: 98.4 (12 Apr 2025 22:30)  HR: 73 (12 Apr 2025 22:30) (73 - 73)  BP: 103/64 (12 Apr 2025 22:30) (103/64 - 103/64)  BP(mean): --  RR: 18 (12 Apr 2025 22:30) (18 - 18)  SpO2: 92% (12 Apr 2025 22:30) (92% - 92%)  Parameters below as of 12 Apr 2025 22:30  Patient On (Oxygen Delivery Method): room air     PHYSICAL EXAM:  Constitutional: Alert, awake  Spine:          Incision: Clean and intact. Small amount of drainage coming from proximal incision. Drainage serous in nature.          Motor exam:          Upper extremity                        Bi(c5)  WE(c6)  EE(c7)   FF(c8)                                                R         5/5        5/5        5/5       5/5                                               L          5/5        5/5        5/5       5/5         Lower extremity                      HF(l2)   KE(l3)    TA(l4)   EHL(l5)  GS(s1)                                                 R        5/5        5/5        5/5       5/5         5/5                                               L         5/5        5/5       5/5       5/5          5/5                                              B/L LE: warm well perfused; capillary refill <3 seconds. BCR. Calves soft NT  Sensation grossly intact to bilateral upper and lower extremities     A/P:  69y Male s/p C5-C7 ACDF 4/2/25 presenting to ED with difficulty swallowing and drainage from incision; here to rule out CSF leak  -   Case discussed with Dr. Bass who gave plan   -   Patient to remain upright in bed at 90 degrees   -   Follow up CT of the neck   -   4x4 applied to incision - patient educated to keep incision covered at all times but may change out 4x4s as they become saturated   -   Decadron ordered - monitor for symptom improvement; 2 doses ordered as patient is a diabetic. Possible sugar check in AM to ensure blood sugar isn't too high  No

## 2025-04-12 NOTE — ED ADULT NURSE NOTE - OBJECTIVE STATEMENT
Pt awake & alert, oriented x 4, presenting to the ED complaining of post op complications. Pt. states he recently underwent a neck surgery. Surgical incision noted to the left throat - swollen and some clear drainage noted. Pt. endorsing trouble swallowing. Airway patent. Respirations even and unlabored. Denies chest pain & SOB. Pt safety maintained.

## 2025-04-12 NOTE — ED PROVIDER NOTE - CLINICAL SUMMARY MEDICAL DECISION MAKING FREE TEXT BOX
---------  Ryanne Delgadillo MD, Attending  69y Male hx of HTN, COPD, DM, CAD, MI, s/p C5-C7 ACDF 4/2/25 presenting to the ED with complaints of increased drainage from incision and difficulty swallowing due to swelling sensation in his neck. Pt has not been tolerating solids, only tolerates jello and water WITH discomfort.   No CP, SOB, bleeding, fever and chills.   Gen: NAD  Head: NC/AT, dry MM  Neck: swelling in b/l neck, L >R, serous drainage from left incision site.   Resp:  No distress, CTA b/l, no increased wob  card: RRR, S1, S2, soft BP  abd: non tender, non distended.   Ext: no deformities  Neuro:  A&O appears non focal  Skin:  Warm and dry as visualized  Psych:  Normal affect and mood    ddx includes, but is not limited to the following: seroma, csf leak, lower suspicion for abscess or hematoma.   plan: ortho consult, CT, screening labs,   update: Pt signed out to PM team pending labs, CT read and CDU for decadron and AM re-eval.

## 2025-04-12 NOTE — ED ADULT TRIAGE NOTE - CHIEF COMPLAINT QUOTE
pt post op neck surgery c/o trouble swallowing and incision leaking , airway intact in triage, hx DM, cardiac stents, sleep apnea

## 2025-04-13 DIAGNOSIS — R22.1 LOCALIZED SWELLING, MASS AND LUMP, NECK: ICD-10-CM

## 2025-04-13 LAB
ANION GAP SERPL CALC-SCNC: 20 MMOL/L — HIGH (ref 5–17)
BLD GP AB SCN SERPL QL: SIGNIFICANT CHANGE UP
BUN SERPL-MCNC: 20.3 MG/DL — HIGH (ref 8–20)
CALCIUM SERPL-MCNC: 9.5 MG/DL — SIGNIFICANT CHANGE UP (ref 8.4–10.5)
CHLORIDE SERPL-SCNC: 100 MMOL/L — SIGNIFICANT CHANGE UP (ref 96–108)
CO2 SERPL-SCNC: 18 MMOL/L — LOW (ref 22–29)
CREAT SERPL-MCNC: 0.98 MG/DL — SIGNIFICANT CHANGE UP (ref 0.5–1.3)
EGFR: 83 ML/MIN/1.73M2 — SIGNIFICANT CHANGE UP
EGFR: 83 ML/MIN/1.73M2 — SIGNIFICANT CHANGE UP
GLUCOSE BLDC GLUCOMTR-MCNC: 170 MG/DL — HIGH (ref 70–99)
GLUCOSE BLDC GLUCOMTR-MCNC: 183 MG/DL — HIGH (ref 70–99)
GLUCOSE BLDC GLUCOMTR-MCNC: 196 MG/DL — HIGH (ref 70–99)
GLUCOSE BLDC GLUCOMTR-MCNC: 204 MG/DL — HIGH (ref 70–99)
GLUCOSE BLDC GLUCOMTR-MCNC: 212 MG/DL — HIGH (ref 70–99)
GLUCOSE SERPL-MCNC: 182 MG/DL — HIGH (ref 70–99)
HCT VFR BLD CALC: 43.1 % — SIGNIFICANT CHANGE UP (ref 39–50)
HGB BLD-MCNC: 14 G/DL — SIGNIFICANT CHANGE UP (ref 13–17)
INR BLD: 1.14 RATIO — SIGNIFICANT CHANGE UP (ref 0.85–1.16)
MCHC RBC-ENTMCNC: 27.9 PG — SIGNIFICANT CHANGE UP (ref 27–34)
MCHC RBC-ENTMCNC: 32.5 G/DL — SIGNIFICANT CHANGE UP (ref 32–36)
MCV RBC AUTO: 85.9 FL — SIGNIFICANT CHANGE UP (ref 80–100)
MRSA PCR RESULT.: SIGNIFICANT CHANGE UP
NRBC # BLD AUTO: 0 K/UL — SIGNIFICANT CHANGE UP (ref 0–0)
NRBC # FLD: 0 K/UL — SIGNIFICANT CHANGE UP (ref 0–0)
NRBC BLD AUTO-RTO: 0 /100 WBCS — SIGNIFICANT CHANGE UP (ref 0–0)
PLATELET # BLD AUTO: 222 K/UL — SIGNIFICANT CHANGE UP (ref 150–400)
PMV BLD: 8.8 FL — SIGNIFICANT CHANGE UP (ref 7–13)
POTASSIUM SERPL-MCNC: 4.7 MMOL/L — SIGNIFICANT CHANGE UP (ref 3.5–5.3)
POTASSIUM SERPL-SCNC: 4.7 MMOL/L — SIGNIFICANT CHANGE UP (ref 3.5–5.3)
PROTHROM AB SERPL-ACNC: 13.2 SEC — SIGNIFICANT CHANGE UP (ref 9.9–13.4)
RBC # BLD: 5.02 M/UL — SIGNIFICANT CHANGE UP (ref 4.2–5.8)
RBC # FLD: 14.5 % — SIGNIFICANT CHANGE UP (ref 10.3–14.5)
S AUREUS DNA NOSE QL NAA+PROBE: SIGNIFICANT CHANGE UP
SODIUM SERPL-SCNC: 138 MMOL/L — SIGNIFICANT CHANGE UP (ref 135–145)
WBC # BLD: 13.95 K/UL — HIGH (ref 3.8–10.5)
WBC # FLD AUTO: 13.95 K/UL — HIGH (ref 3.8–10.5)

## 2025-04-13 PROCEDURE — 71045 X-RAY EXAM CHEST 1 VIEW: CPT | Mod: 26

## 2025-04-13 PROCEDURE — 99223 1ST HOSP IP/OBS HIGH 75: CPT | Mod: FS,57

## 2025-04-13 PROCEDURE — 70490 CT SOFT TISSUE NECK W/O DYE: CPT | Mod: 26

## 2025-04-13 PROCEDURE — 99222 1ST HOSP IP/OBS MODERATE 55: CPT

## 2025-04-13 PROCEDURE — 99223 1ST HOSP IP/OBS HIGH 75: CPT

## 2025-04-13 PROCEDURE — 93010 ELECTROCARDIOGRAM REPORT: CPT

## 2025-04-13 RX ORDER — CARVEDILOL 3.12 MG/1
6.25 TABLET, FILM COATED ORAL EVERY 12 HOURS
Refills: 0 | Status: DISCONTINUED | OUTPATIENT
Start: 2025-04-13 | End: 2025-04-14

## 2025-04-13 RX ORDER — DEXTROSE 50 % IN WATER 50 %
25 SYRINGE (ML) INTRAVENOUS ONCE
Refills: 0 | Status: DISCONTINUED | OUTPATIENT
Start: 2025-04-13 | End: 2025-04-14

## 2025-04-13 RX ORDER — CEFAZOLIN SODIUM IN 0.9 % NACL 3 G/100 ML
2000 INTRAVENOUS SOLUTION, PIGGYBACK (ML) INTRAVENOUS ONCE
Refills: 0 | Status: DISCONTINUED | OUTPATIENT
Start: 2025-04-14 | End: 2025-04-15

## 2025-04-13 RX ORDER — DEXTROSE 50 % IN WATER 50 %
12.5 SYRINGE (ML) INTRAVENOUS ONCE
Refills: 0 | Status: DISCONTINUED | OUTPATIENT
Start: 2025-04-13 | End: 2025-04-14

## 2025-04-13 RX ORDER — SODIUM CHLORIDE 9 G/1000ML
1000 INJECTION, SOLUTION INTRAVENOUS
Refills: 0 | Status: DISCONTINUED | OUTPATIENT
Start: 2025-04-13 | End: 2025-04-14

## 2025-04-13 RX ORDER — GLUCAGON 3 MG/1
1 POWDER NASAL ONCE
Refills: 0 | Status: DISCONTINUED | OUTPATIENT
Start: 2025-04-13 | End: 2025-04-14

## 2025-04-13 RX ORDER — ROSUVASTATIN CALCIUM 5 MG/1
40 TABLET, FILM COATED ORAL AT BEDTIME
Refills: 0 | Status: DISCONTINUED | OUTPATIENT
Start: 2025-04-13 | End: 2025-04-14

## 2025-04-13 RX ORDER — INSULIN LISPRO 100 U/ML
INJECTION, SOLUTION INTRAVENOUS; SUBCUTANEOUS
Refills: 0 | Status: DISCONTINUED | OUTPATIENT
Start: 2025-04-13 | End: 2025-04-14

## 2025-04-13 RX ORDER — MEMANTINE HYDROCHLORIDE 21 MG/1
10 CAPSULE, EXTENDED RELEASE ORAL
Refills: 0 | Status: DISCONTINUED | OUTPATIENT
Start: 2025-04-13 | End: 2025-04-14

## 2025-04-13 RX ORDER — GABAPENTIN 400 MG/1
300 CAPSULE ORAL
Refills: 0 | Status: DISCONTINUED | OUTPATIENT
Start: 2025-04-13 | End: 2025-04-14

## 2025-04-13 RX ORDER — ACETAMINOPHEN 500 MG/5ML
975 LIQUID (ML) ORAL EVERY 8 HOURS
Refills: 0 | Status: DISCONTINUED | OUTPATIENT
Start: 2025-04-13 | End: 2025-04-14

## 2025-04-13 RX ORDER — POVIDONE-IODINE 7.5 %
1 SOLUTION, NON-ORAL TOPICAL ONCE
Refills: 0 | Status: COMPLETED | OUTPATIENT
Start: 2025-04-13 | End: 2025-04-14

## 2025-04-13 RX ORDER — CITALOPRAM 20 MG/1
10 TABLET ORAL DAILY
Refills: 0 | Status: DISCONTINUED | OUTPATIENT
Start: 2025-04-13 | End: 2025-04-14

## 2025-04-13 RX ORDER — ENOXAPARIN SODIUM 100 MG/ML
40 INJECTION SUBCUTANEOUS ONCE
Refills: 0 | Status: COMPLETED | OUTPATIENT
Start: 2025-04-13 | End: 2025-04-13

## 2025-04-13 RX ORDER — OXYBUTYNIN CHLORIDE 5 MG/1
5 TABLET, FILM COATED, EXTENDED RELEASE ORAL
Refills: 0 | Status: DISCONTINUED | OUTPATIENT
Start: 2025-04-13 | End: 2025-04-14

## 2025-04-13 RX ORDER — EZETIMIBE 10 MG/1
10 TABLET ORAL DAILY
Refills: 0 | Status: DISCONTINUED | OUTPATIENT
Start: 2025-04-13 | End: 2025-04-14

## 2025-04-13 RX ORDER — MUPIROCIN CALCIUM 20 MG/G
1 CREAM TOPICAL
Refills: 0 | Status: DISCONTINUED | OUTPATIENT
Start: 2025-04-13 | End: 2025-04-14

## 2025-04-13 RX ORDER — TAMSULOSIN HYDROCHLORIDE 0.4 MG/1
0.4 CAPSULE ORAL AT BEDTIME
Refills: 0 | Status: DISCONTINUED | OUTPATIENT
Start: 2025-04-13 | End: 2025-04-14

## 2025-04-13 RX ORDER — LOSARTAN POTASSIUM 100 MG/1
50 TABLET, FILM COATED ORAL DAILY
Refills: 0 | Status: DISCONTINUED | OUTPATIENT
Start: 2025-04-13 | End: 2025-04-14

## 2025-04-13 RX ORDER — DEXTROSE 50 % IN WATER 50 %
15 SYRINGE (ML) INTRAVENOUS ONCE
Refills: 0 | Status: DISCONTINUED | OUTPATIENT
Start: 2025-04-13 | End: 2025-04-14

## 2025-04-13 RX ADMIN — GABAPENTIN 300 MILLIGRAM(S): 400 CAPSULE ORAL at 05:09

## 2025-04-13 RX ADMIN — MEMANTINE HYDROCHLORIDE 10 MILLIGRAM(S): 21 CAPSULE, EXTENDED RELEASE ORAL at 18:28

## 2025-04-13 RX ADMIN — ROSUVASTATIN CALCIUM 40 MILLIGRAM(S): 5 TABLET, FILM COATED ORAL at 21:03

## 2025-04-13 RX ADMIN — MEMANTINE HYDROCHLORIDE 10 MILLIGRAM(S): 21 CAPSULE, EXTENDED RELEASE ORAL at 05:09

## 2025-04-13 RX ADMIN — LOSARTAN POTASSIUM 50 MILLIGRAM(S): 100 TABLET, FILM COATED ORAL at 05:10

## 2025-04-13 RX ADMIN — MUPIROCIN CALCIUM 1 APPLICATION(S): 20 CREAM TOPICAL at 18:27

## 2025-04-13 RX ADMIN — TAMSULOSIN HYDROCHLORIDE 0.4 MILLIGRAM(S): 0.4 CAPSULE ORAL at 21:54

## 2025-04-13 RX ADMIN — INSULIN LISPRO 2: 100 INJECTION, SOLUTION INTRAVENOUS; SUBCUTANEOUS at 09:12

## 2025-04-13 RX ADMIN — Medication 1000 MILLILITER(S): at 00:11

## 2025-04-13 RX ADMIN — OXYBUTYNIN CHLORIDE 5 MILLIGRAM(S): 5 TABLET, FILM COATED, EXTENDED RELEASE ORAL at 05:10

## 2025-04-13 RX ADMIN — OXYBUTYNIN CHLORIDE 5 MILLIGRAM(S): 5 TABLET, FILM COATED, EXTENDED RELEASE ORAL at 18:28

## 2025-04-13 RX ADMIN — GABAPENTIN 300 MILLIGRAM(S): 400 CAPSULE ORAL at 18:28

## 2025-04-13 RX ADMIN — Medication 975 MILLIGRAM(S): at 14:47

## 2025-04-13 RX ADMIN — INSULIN LISPRO 2: 100 INJECTION, SOLUTION INTRAVENOUS; SUBCUTANEOUS at 12:56

## 2025-04-13 RX ADMIN — Medication 20 MILLIGRAM(S): at 09:05

## 2025-04-13 RX ADMIN — CARVEDILOL 6.25 MILLIGRAM(S): 3.12 TABLET, FILM COATED ORAL at 05:10

## 2025-04-13 RX ADMIN — EZETIMIBE 10 MILLIGRAM(S): 10 TABLET ORAL at 11:25

## 2025-04-13 RX ADMIN — ENOXAPARIN SODIUM 40 MILLIGRAM(S): 100 INJECTION SUBCUTANEOUS at 18:38

## 2025-04-13 RX ADMIN — CARVEDILOL 6.25 MILLIGRAM(S): 3.12 TABLET, FILM COATED ORAL at 18:28

## 2025-04-13 RX ADMIN — INSULIN LISPRO 4: 100 INJECTION, SOLUTION INTRAVENOUS; SUBCUTANEOUS at 18:27

## 2025-04-13 RX ADMIN — CITALOPRAM 10 MILLIGRAM(S): 20 TABLET ORAL at 11:26

## 2025-04-13 RX ADMIN — Medication 1 APPLICATION(S): at 18:27

## 2025-04-13 RX ADMIN — DEXAMETHASONE 4 MILLIGRAM(S): 0.5 TABLET ORAL at 05:10

## 2025-04-13 NOTE — PROGRESS NOTE ADULT - SUBJECTIVE AND OBJECTIVE BOX
Patient seen and examined at 7:30 am. Patient reports that difficulty swallowing has improved this am. Feels better. Gauze noted on anterior neck incision. At this time dressing is dry but patient reports he changed it right before my arrival. Denies acute sensory/motor changes.    Vital Signs Last 24 Hrs  T(C): 37.1 (13 Apr 2025 05:48), Max: 37.1 (13 Apr 2025 05:48)  T(F): 98.7 (13 Apr 2025 05:48), Max: 98.7 (13 Apr 2025 05:48)  HR: 75 (13 Apr 2025 07:10) (69 - 82)  BP: 125/60 (13 Apr 2025 07:10) (94/55 - 133/77)  BP(mean): --  RR: 20 (13 Apr 2025 07:10) (18 - 20)  SpO2: 94% (13 Apr 2025 07:10) (92% - 100%)    Parameters below as of 13 Apr 2025 07:10  Patient On (Oxygen Delivery Method): room air                            13.6   12.43 )-----------( 204      ( 12 Apr 2025 22:59 )             43.0     04-12    134[L]  |  97  |  20.2[H]  ----------------------------<  169[H]  3.9   |  23.0  |  1.29    Ca    8.7      12 Apr 2025 22:59    TPro  6.9  /  Alb  3.7  /  TBili  0.6  /  DBili  x   /  AST  13  /  ALT  25  /  AlkPhos  54  04-12      Alert and oriented NAD  Anterior neck incision healing well medially. small opening noted laterally. No visible active draining. New dressing placed.    Plan:  Q2 dressing changes to monitor output  HOB at 90 degrees. patient encouraged to ambulate and sit up.  d/w dr maurice    9:30 am dressing change: Mild/moderate serous draining noted. Dressing removed. No active draining noted. New dressing placed  will recheck at 11:30 am

## 2025-04-13 NOTE — ED CDU PROVIDER INITIAL DAY NOTE - PHYSICAL EXAMINATION
Constitutional: Awake, alert, in no acute distress  Eyes: no scleral icterus  HENT: normocephalic, atraumatic, moist oral mucosa, airway patent, no stridor  Neck: +swelling of anterior neck L > R with mild serous drainage from incision site, minimal erythema, no warmth/crepitus/purulent drainage.  CV: RRR, no murmur  Pulm: non-labored respirations, CTAB  Abdomen: soft, non-tender, non-distended  Extremities: no edema, no deformity  Skin: no rash, no jaundice  Neuro: AAOx3, moving all extremities equally

## 2025-04-13 NOTE — PATIENT PROFILE ADULT - NSPROGENOTHERPROVIDER_GEN_A_NUR
none Abdomen soft/No distension/No tenderness/Bowel sounds present and normal/No hernia(s)/No evidence of prior surgery

## 2025-04-13 NOTE — ED ADULT NURSE REASSESSMENT NOTE - NS ED NURSE REASSESS COMMENT FT1
left side neck dressing changed. piror 4x4 gauze saturated and greenish yellow secretions  at surgical site. swelling noted to throat, denies difficulty swallowing at this time. pt eating small diced food. nonproductive cough

## 2025-04-13 NOTE — PATIENT PROFILE ADULT - NSPROIMPLANTSMEDDEV_GEN_A_NUR
Please refer to the dietary recommendations for elevated blood pressure, I will send you home with a couple days of Lasix to help remove excess fluid to the lower extremities. Use take your blood pressure medication as prescribed and follow-up with your PCP for further guidance. Return to the ER with worsening of symptoms. Please call podiatry to schedule follow-up appointment for the heel spurs, take the naproxen 2 times a day for minimum of 7 to 10 days to help with pain.
None

## 2025-04-13 NOTE — CONSULT NOTE ADULT - ASSESSMENT
68 y/o male with Hx significant for MI, s/p PCI x 11, TIA, PFO s/p PFO closure, HTN, HLD, DM2, colitis, alzheimer's, and MILLICENT on CPAP, he was previous diagnosed in 2023 with cervical myelopathy and lumbar disc disease at which time he underwent a lumbar laminectomy at [facility], He states he noted an improvement in his neck pain after surgery but in the past couple of months has noted a progressive worsening in pain, he was having neck pain, he came in here for elective anterior cervical discectomy and fusion C5-C6, C5-C7 with Dr. Spencer on 4/2/25 s/p surgery, he was discharged after surgery, comes back with draining wound on his neck, likely going to OR for wound exploration/ I&D, medicine consulted for Medical Management.     Neck pain s/p anterior cervical discectomy and fusion C5-C6, C5-C7, now has wound infection:     -Plain of wound exploration/ I&D tomorrow   -obtain blood cultures   - obtain wound cultures   -hold off on antibiotics  -after surgery would start Zosyn and Vanc    -would call ID consult   - opiate induced constipation regimen   - encouraging incentive spirometry   -c/w local wound care per ortho   -DVT prophylaxis and Pain meds as per Ortho team   -PT/OT   -gabapentin 300 mg 2 times a day  -will monitor CBC for Leucocytosis.     CAD: continue with aspirin     Hx of HTN: will continue with Losartan 50 mg once a day and carvedilol 6.25 mg 2 times a day with holding parameters     Hx of Anxiety: will continue with	citalopram 10 mg once a day    GERD: will continue with Pantoprazole 20 mg once a day    HLD: will continue with Ezetimibe 10 mg once a day and Rosuvastatin 40 mg once a day    Hx of alzheimer's disease: will continue with memantine 10 mg 2 times a day    Hx of over active bladder, will continue with Fesoterodine 8 mg orally once a day (at bedtime) and Gemtesa 75 mg once a day    Hx of DM: FS monitoring and coverage insulin, will hold Jardiance 25 mg once a day, will resume after surgery, will resume Mounjaro upon discharge, post op if sugar levels are high might start low dose lantus     MILLICENT: will continue with home CPAP    Patient denies Hx of exertional dysnea or chest pain, ,no personal or family hx of easy bleeding, Patient's METS Score is >4  and RCRI is 2, patient labs reviewed, patient is at intermediate risk for perioperative cardiovascular complication and is optimized from the medicine point of view for planned procedure, as he tolerated inital procedure well two weeks ago and had cardiac clearance, would just check EKG before proceeding for procedure

## 2025-04-13 NOTE — ED CDU PROVIDER INITIAL DAY NOTE - NSTIMEPROVIDERCAREINITIATE_GEN_ER
s/p left inguinal hernia repair with mesh-1/5/2023 Called to check on patient doing well-pain minimal, no swelling. Urinating fine. No nausea or vomiting. Eating and drinking fine. Just taking Tylenol for pain. Incisions look good. Instructed patient to call with any issues or concerns.
12-Apr-2025 22:58

## 2025-04-13 NOTE — ED ADULT NURSE REASSESSMENT NOTE - NS ED NURSE REASSESS COMMENT FT1
Report given to MADY Bella. Pt moved to CDU 12Hall . Pt placed on telebox. Patient awake and alert, respirations even and unlabored, in no apparent distress.  Plan, abnormal labs, history of present illness, pending labs/tests explained, opportunity to answer questions provided.

## 2025-04-13 NOTE — ED CDU PROVIDER INITIAL DAY NOTE - CLINICAL SUMMARY MEDICAL DECISION MAKING FREE TEXT BOX
69y M presents for neck swelling and trouble swallowing after having recent C5-C7 ACDF on 4/2/25. Noted to have serous drainage from surgical incision area. Pt with intact airway, no respiratory symptoms. CT showing mild prevertebral edema and swelling in the left lower neck near the incision site likely residual postsurgical changes; no organized fluid collection. No clinical evidence of infection at this time. Ortho consulted -- advising giving decadron 4 mg IV x 2. Will keep in observation for continued monitoring and observation.

## 2025-04-13 NOTE — ED ADULT NURSE REASSESSMENT NOTE - NS ED NURSE REASSESS COMMENT FT1
Assumed care of pt from night shift RN. Aox4. Pt resting comfortably in stretcher. Pt reports pain 5/10; previously 10/10. Denies chest pain, SOB, abd pain, back pain, headaches, dizziness, lightheadedness, fevers, chills, nausea, vomiting, diarrhea, constipation and dysuria. Pt on cardiac monitor in NSR 78 HR and  95% RA. RR even and unlabored. Skin warm to touch. Pt in no apparent distress.  Plan, abnormal labs, history of present illness, pending labs/tests reviewed.

## 2025-04-13 NOTE — H&P ADULT - HISTORY OF PRESENT ILLNESS
JAYME DE JESUS  [id number]    SUBJECTIVE: Patient is a 69y Male s/p C5-C7 ACDF 4/2/25 presenting to the ED with complaints of increased drainage from incision and difficulty swallowing. Patient was seen last night with reported difficulty swallowing. THis am patient reports that swallowing has improved with decadron. Dressings this am was changed q2 hours x 3 times. each time dressing was saturated with serous draining. Patient denies acute sensory/motor. Refer to consult note for initial presentation and history. At this time, orthopedics will admit to monitor draining and possibly go to OR tomorrow 4/14 for incision and drainage. Patient reports he does not take plavix but has taken the aspirin 325 QD that was prescribed post op.       OBJECTIVE:   Vital Signs Last 24 Hrs  T(C): 37.1 (13 Apr 2025 05:48), Max: 37.1 (13 Apr 2025 05:48)  T(F): 98.7 (13 Apr 2025 05:48), Max: 98.7 (13 Apr 2025 05:48)  HR: 95 (13 Apr 2025 11:18) (69 - 95)  BP: 147/81 (13 Apr 2025 11:18) (94/55 - 147/81)  BP(mean): --  RR: 20 (13 Apr 2025 11:18) (18 - 20)  SpO2: 97% (13 Apr 2025 11:18) (92% - 100%)    Parameters below as of 13 Apr 2025 07:10  Patient On (Oxygen Delivery Method): room air        PHYSICAL EXAM:  Constitutional: Alert, awake  Spine:          Incision: saturated with serous draining.          Motor exam:          Upper extremity                        Bi(c5)  WE(c6)  EE(c7)   FF(c8)                                                R         5/5        5/5        5/5       5/5                                               L          5/5        5/5        5/5       5/5         Lower extremity                      HF(l2)   KE(l3)    TA(l4)   EHL(l5)  GS(s1)                                                 R        5/5        5/5        5/5       5/5         5/5                                               L         5/5        5/5       5/5       5/5          5/5                                                B/L LE: warm well perfused; capillary refill <3 seconds. BCR. Calves soft NT  Sensation grossly intact to bilateral upper and lower extremities     A/P:  69y Male s/p C5-C7 ACDF 4/2/25 presenting to ED with difficulty swallowing and drainage from incision; here to rule out CSF leak  -   Case discussed with Dr. Maurice who gave plan   -   Patient to remain upright in bed at 90 degrees   -   will monitor dressing.  -  Admit to ortho  - NPO after midnight  - OR with dr. maurice 4/14 for incision and drainage with wound closure  - preop abx  - medicine consulted for clearance

## 2025-04-13 NOTE — ED CDU PROVIDER INITIAL DAY NOTE - OBJECTIVE STATEMENT
69y Male hx of HTN, COPD, DM, CAD, MI, s/p C5-C7 ACDF 4/2/25 presenting to the ED with complaints of increased drainage from incision and difficulty swallowing due to swelling sensation in his neck. Pt has not been tolerating solids, only tolerates jello and water WITH discomfort.   No CP, SOB, bleeding, fever and chills.

## 2025-04-13 NOTE — H&P ADULT - NS ATTEND AMEND GEN_ALL_CORE FT
Orthopaedic Spine/Trauma Addendum:    I have personally reviewed the patients chart, imaging and lab results. I have reviewed the physician assistant note and agree with the history, exam, and plan of care, except as noted.    Remi is having persistent drainage from his incision site serous fluid questionable purulence   given the IntraOp dural tear I believe this is likely CSF fluid   plan is for return to the OR for exploration and durotomy repair  today 4/14/25      Alessio Bass DO  Orthopaedic Spine/Trauma Surgeon  [facility] [place]

## 2025-04-13 NOTE — CHART NOTE - NSCHARTNOTEFT_GEN_A_CORE
pt refused cpap last night, benefit and discussed with the pt. pt remains stable on room air, no respiratory distress noted at any time. bipap on standby in the room.
Patient placed on bipap due to MILLICENT via V60 for the night. Allevyn placed on bridge of nose. Alarms checked and functioning. Patient resting comfortably with no sign of distress. Will continue to monitor.
Pt is on 3LPM NC, SpO2 96%.

## 2025-04-14 ENCOUNTER — TRANSCRIPTION ENCOUNTER (OUTPATIENT)
Age: 70
End: 2025-04-14

## 2025-04-14 DIAGNOSIS — Z98.890 OTHER SPECIFIED POSTPROCEDURAL STATES: ICD-10-CM

## 2025-04-14 LAB
ALBUMIN SERPL ELPH-MCNC: 3.7 G/DL — SIGNIFICANT CHANGE UP (ref 3.3–5.2)
ALP SERPL-CCNC: 58 U/L — SIGNIFICANT CHANGE UP (ref 40–120)
ALT FLD-CCNC: 24 U/L — SIGNIFICANT CHANGE UP
ANION GAP SERPL CALC-SCNC: 15 MMOL/L — SIGNIFICANT CHANGE UP (ref 5–17)
AST SERPL-CCNC: 16 U/L — SIGNIFICANT CHANGE UP
BILIRUB SERPL-MCNC: 0.3 MG/DL — LOW (ref 0.4–2)
BLD GP AB SCN SERPL QL: SIGNIFICANT CHANGE UP
BUN SERPL-MCNC: 19.7 MG/DL — SIGNIFICANT CHANGE UP (ref 8–20)
CALCIUM SERPL-MCNC: 8.8 MG/DL — SIGNIFICANT CHANGE UP (ref 8.4–10.5)
CHLORIDE SERPL-SCNC: 101 MMOL/L — SIGNIFICANT CHANGE UP (ref 96–108)
CO2 SERPL-SCNC: 22 MMOL/L — SIGNIFICANT CHANGE UP (ref 22–29)
CREAT SERPL-MCNC: 0.9 MG/DL — SIGNIFICANT CHANGE UP (ref 0.5–1.3)
EGFR: 92 ML/MIN/1.73M2 — SIGNIFICANT CHANGE UP
EGFR: 92 ML/MIN/1.73M2 — SIGNIFICANT CHANGE UP
GLUCOSE BLDC GLUCOMTR-MCNC: 115 MG/DL — HIGH (ref 70–99)
GLUCOSE BLDC GLUCOMTR-MCNC: 129 MG/DL — HIGH (ref 70–99)
GLUCOSE BLDC GLUCOMTR-MCNC: 138 MG/DL — HIGH (ref 70–99)
GLUCOSE SERPL-MCNC: 144 MG/DL — HIGH (ref 70–99)
HCT VFR BLD CALC: 42.5 % — SIGNIFICANT CHANGE UP (ref 39–50)
HGB BLD-MCNC: 13.7 G/DL — SIGNIFICANT CHANGE UP (ref 13–17)
MCHC RBC-ENTMCNC: 27.7 PG — SIGNIFICANT CHANGE UP (ref 27–34)
MCHC RBC-ENTMCNC: 32.2 G/DL — SIGNIFICANT CHANGE UP (ref 32–36)
MCV RBC AUTO: 86 FL — SIGNIFICANT CHANGE UP (ref 80–100)
NRBC # BLD AUTO: 0 K/UL — SIGNIFICANT CHANGE UP (ref 0–0)
NRBC # FLD: 0 K/UL — SIGNIFICANT CHANGE UP (ref 0–0)
NRBC BLD AUTO-RTO: 0 /100 WBCS — SIGNIFICANT CHANGE UP (ref 0–0)
PLATELET # BLD AUTO: 234 K/UL — SIGNIFICANT CHANGE UP (ref 150–400)
PMV BLD: 8.8 FL — SIGNIFICANT CHANGE UP (ref 7–13)
POTASSIUM SERPL-MCNC: 4.2 MMOL/L — SIGNIFICANT CHANGE UP (ref 3.5–5.3)
POTASSIUM SERPL-SCNC: 4.2 MMOL/L — SIGNIFICANT CHANGE UP (ref 3.5–5.3)
PROT SERPL-MCNC: 6.9 G/DL — SIGNIFICANT CHANGE UP (ref 6.6–8.7)
RBC # BLD: 4.94 M/UL — SIGNIFICANT CHANGE UP (ref 4.2–5.8)
RBC # FLD: 14.5 % — SIGNIFICANT CHANGE UP (ref 10.3–14.5)
SODIUM SERPL-SCNC: 138 MMOL/L — SIGNIFICANT CHANGE UP (ref 135–145)
WBC # BLD: 14.83 K/UL — HIGH (ref 3.8–10.5)
WBC # FLD AUTO: 14.83 K/UL — HIGH (ref 3.8–10.5)

## 2025-04-14 PROCEDURE — 99232 SBSQ HOSP IP/OBS MODERATE 35: CPT | Mod: FS

## 2025-04-14 DEVICE — DISTRACTION PIN 14MM (YELLOW): Type: IMPLANTABLE DEVICE | Status: FUNCTIONAL

## 2025-04-14 DEVICE — IMPLANTABLE DEVICE: Type: IMPLANTABLE DEVICE | Status: FUNCTIONAL

## 2025-04-14 DEVICE — GRAFT DURAL MATRX 1 X 3IN DURGN: Type: IMPLANTABLE DEVICE | Status: FUNCTIONAL

## 2025-04-14 DEVICE — SURGIFOAM 8 X 12.5CM X 10MM (100): Type: IMPLANTABLE DEVICE | Status: FUNCTIONAL

## 2025-04-14 DEVICE — SURGIFLO MATRIX WITH THROMBIN KIT: Type: IMPLANTABLE DEVICE | Status: FUNCTIONAL

## 2025-04-14 DEVICE — DISTRACTION PIN 12MM (BLUE): Type: IMPLANTABLE DEVICE | Status: FUNCTIONAL

## 2025-04-14 DEVICE — DURASEAL SEALANT 5ML: Type: IMPLANTABLE DEVICE | Status: FUNCTIONAL

## 2025-04-14 RX ORDER — INSULIN LISPRO 100 U/ML
INJECTION, SOLUTION INTRAVENOUS; SUBCUTANEOUS AT BEDTIME
Refills: 0 | Status: DISCONTINUED | OUTPATIENT
Start: 2025-04-14 | End: 2025-04-15

## 2025-04-14 RX ORDER — SODIUM CHLORIDE 9 G/1000ML
1000 INJECTION, SOLUTION INTRAVENOUS
Refills: 0 | Status: DISCONTINUED | OUTPATIENT
Start: 2025-04-14 | End: 2025-04-14

## 2025-04-14 RX ORDER — ONDANSETRON HCL/PF 4 MG/2 ML
4 VIAL (ML) INJECTION ONCE
Refills: 0 | Status: DISCONTINUED | OUTPATIENT
Start: 2025-04-14 | End: 2025-04-14

## 2025-04-14 RX ORDER — OXYBUTYNIN CHLORIDE 5 MG/1
5 TABLET, FILM COATED, EXTENDED RELEASE ORAL
Refills: 0 | Status: DISCONTINUED | OUTPATIENT
Start: 2025-04-14 | End: 2025-04-15

## 2025-04-14 RX ORDER — PIPERACILLIN-TAZO-DEXTROSE,ISO 3.375G/5
3.38 IV SOLUTION, PIGGYBACK PREMIX FROZEN(ML) INTRAVENOUS EVERY 8 HOURS
Refills: 0 | Status: DISCONTINUED | OUTPATIENT
Start: 2025-04-14 | End: 2025-04-15

## 2025-04-14 RX ORDER — ROSUVASTATIN CALCIUM 5 MG/1
40 TABLET, FILM COATED ORAL AT BEDTIME
Refills: 0 | Status: DISCONTINUED | OUTPATIENT
Start: 2025-04-14 | End: 2025-04-15

## 2025-04-14 RX ORDER — ONDANSETRON HCL/PF 4 MG/2 ML
4 VIAL (ML) INJECTION EVERY 6 HOURS
Refills: 0 | Status: DISCONTINUED | OUTPATIENT
Start: 2025-04-14 | End: 2025-04-15

## 2025-04-14 RX ORDER — METHOCARBAMOL 500 MG/1
500 TABLET, FILM COATED ORAL EVERY 8 HOURS
Refills: 0 | Status: DISCONTINUED | OUTPATIENT
Start: 2025-04-14 | End: 2025-04-15

## 2025-04-14 RX ORDER — DEXTROSE 50 % IN WATER 50 %
15 SYRINGE (ML) INTRAVENOUS ONCE
Refills: 0 | Status: DISCONTINUED | OUTPATIENT
Start: 2025-04-14 | End: 2025-04-15

## 2025-04-14 RX ORDER — CEFAZOLIN SODIUM IN 0.9 % NACL 3 G/100 ML
2000 INTRAVENOUS SOLUTION, PIGGYBACK (ML) INTRAVENOUS
Refills: 0 | Status: DISCONTINUED | OUTPATIENT
Start: 2025-04-14 | End: 2025-04-14

## 2025-04-14 RX ORDER — ACETAMINOPHEN 500 MG/5ML
975 LIQUID (ML) ORAL EVERY 8 HOURS
Refills: 0 | Status: DISCONTINUED | OUTPATIENT
Start: 2025-04-14 | End: 2025-04-15

## 2025-04-14 RX ORDER — LOSARTAN POTASSIUM 100 MG/1
50 TABLET, FILM COATED ORAL DAILY
Refills: 0 | Status: DISCONTINUED | OUTPATIENT
Start: 2025-04-16 | End: 2025-04-15

## 2025-04-14 RX ORDER — GABAPENTIN 400 MG/1
300 CAPSULE ORAL
Refills: 0 | Status: DISCONTINUED | OUTPATIENT
Start: 2025-04-14 | End: 2025-04-15

## 2025-04-14 RX ORDER — SODIUM CHLORIDE 9 G/1000ML
1000 INJECTION, SOLUTION INTRAVENOUS
Refills: 0 | Status: DISCONTINUED | OUTPATIENT
Start: 2025-04-14 | End: 2025-04-15

## 2025-04-14 RX ORDER — DEXTROSE 50 % IN WATER 50 %
25 SYRINGE (ML) INTRAVENOUS ONCE
Refills: 0 | Status: DISCONTINUED | OUTPATIENT
Start: 2025-04-14 | End: 2025-04-15

## 2025-04-14 RX ORDER — CEFAZOLIN SODIUM IN 0.9 % NACL 3 G/100 ML
2000 INTRAVENOUS SOLUTION, PIGGYBACK (ML) INTRAVENOUS
Refills: 0 | Status: COMPLETED | OUTPATIENT
Start: 2025-04-14 | End: 2025-04-15

## 2025-04-14 RX ORDER — MEMANTINE HYDROCHLORIDE 21 MG/1
10 CAPSULE, EXTENDED RELEASE ORAL
Refills: 0 | Status: DISCONTINUED | OUTPATIENT
Start: 2025-04-14 | End: 2025-04-15

## 2025-04-14 RX ORDER — CARVEDILOL 3.12 MG/1
6.25 TABLET, FILM COATED ORAL EVERY 12 HOURS
Refills: 0 | Status: DISCONTINUED | OUTPATIENT
Start: 2025-04-14 | End: 2025-04-15

## 2025-04-14 RX ORDER — CITALOPRAM 20 MG/1
10 TABLET ORAL DAILY
Refills: 0 | Status: DISCONTINUED | OUTPATIENT
Start: 2025-04-14 | End: 2025-04-15

## 2025-04-14 RX ORDER — PIPERACILLIN-TAZO-DEXTROSE,ISO 3.375G/5
3.38 IV SOLUTION, PIGGYBACK PREMIX FROZEN(ML) INTRAVENOUS EVERY 8 HOURS
Refills: 0 | Status: DISCONTINUED | OUTPATIENT
Start: 2025-04-14 | End: 2025-04-14

## 2025-04-14 RX ORDER — VANCOMYCIN HCL IN 5 % DEXTROSE 1.5G/250ML
1500 PLASTIC BAG, INJECTION (ML) INTRAVENOUS EVERY 12 HOURS
Refills: 0 | Status: DISCONTINUED | OUTPATIENT
Start: 2025-04-15 | End: 2025-04-15

## 2025-04-14 RX ORDER — OXYCODONE HYDROCHLORIDE 30 MG/1
5 TABLET ORAL
Refills: 0 | Status: DISCONTINUED | OUTPATIENT
Start: 2025-04-14 | End: 2025-04-15

## 2025-04-14 RX ORDER — CELECOXIB 50 MG/1
200 CAPSULE ORAL ONCE
Refills: 0 | Status: COMPLETED | OUTPATIENT
Start: 2025-04-14 | End: 2025-04-14

## 2025-04-14 RX ORDER — PIPERACILLIN-TAZO-DEXTROSE,ISO 3.375G/5
3.38 IV SOLUTION, PIGGYBACK PREMIX FROZEN(ML) INTRAVENOUS ONCE
Refills: 0 | Status: DISCONTINUED | OUTPATIENT
Start: 2025-04-14 | End: 2025-04-14

## 2025-04-14 RX ORDER — MELATONIN 5 MG
3 TABLET ORAL AT BEDTIME
Refills: 0 | Status: DISCONTINUED | OUTPATIENT
Start: 2025-04-14 | End: 2025-04-15

## 2025-04-14 RX ORDER — MAGNESIUM HYDROXIDE 400 MG/5ML
30 SUSPENSION ORAL EVERY 12 HOURS
Refills: 0 | Status: DISCONTINUED | OUTPATIENT
Start: 2025-04-14 | End: 2025-04-15

## 2025-04-14 RX ORDER — GLUCAGON 3 MG/1
1 POWDER NASAL ONCE
Refills: 0 | Status: DISCONTINUED | OUTPATIENT
Start: 2025-04-14 | End: 2025-04-15

## 2025-04-14 RX ORDER — VANCOMYCIN HCL IN 5 % DEXTROSE 1.5G/250ML
1500 PLASTIC BAG, INJECTION (ML) INTRAVENOUS ONCE
Refills: 0 | Status: COMPLETED | OUTPATIENT
Start: 2025-04-14 | End: 2025-04-15

## 2025-04-14 RX ORDER — DEXTROSE 50 % IN WATER 50 %
12.5 SYRINGE (ML) INTRAVENOUS ONCE
Refills: 0 | Status: DISCONTINUED | OUTPATIENT
Start: 2025-04-14 | End: 2025-04-15

## 2025-04-14 RX ORDER — ASPIRIN 325 MG
81 TABLET ORAL DAILY
Refills: 0 | Status: DISCONTINUED | OUTPATIENT
Start: 2025-04-15 | End: 2025-04-15

## 2025-04-14 RX ORDER — OXYCODONE HYDROCHLORIDE 30 MG/1
10 TABLET ORAL
Refills: 0 | Status: DISCONTINUED | OUTPATIENT
Start: 2025-04-14 | End: 2025-04-15

## 2025-04-14 RX ORDER — MAGNESIUM, ALUMINUM HYDROXIDE 200-200 MG
30 TABLET,CHEWABLE ORAL EVERY 12 HOURS
Refills: 0 | Status: DISCONTINUED | OUTPATIENT
Start: 2025-04-14 | End: 2025-04-15

## 2025-04-14 RX ORDER — SENNA 187 MG
2 TABLET ORAL AT BEDTIME
Refills: 0 | Status: DISCONTINUED | OUTPATIENT
Start: 2025-04-14 | End: 2025-04-15

## 2025-04-14 RX ORDER — ACETAMINOPHEN 500 MG/5ML
975 LIQUID (ML) ORAL ONCE
Refills: 0 | Status: COMPLETED | OUTPATIENT
Start: 2025-04-14 | End: 2025-04-14

## 2025-04-14 RX ORDER — EZETIMIBE 10 MG/1
10 TABLET ORAL DAILY
Refills: 0 | Status: DISCONTINUED | OUTPATIENT
Start: 2025-04-14 | End: 2025-04-15

## 2025-04-14 RX ORDER — APREPITANT 40 MG/1
40 CAPSULE ORAL ONCE
Refills: 0 | Status: COMPLETED | OUTPATIENT
Start: 2025-04-14 | End: 2025-04-14

## 2025-04-14 RX ORDER — INSULIN LISPRO 100 U/ML
INJECTION, SOLUTION INTRAVENOUS; SUBCUTANEOUS
Refills: 0 | Status: DISCONTINUED | OUTPATIENT
Start: 2025-04-14 | End: 2025-04-15

## 2025-04-14 RX ORDER — HYDROMORPHONE/SOD CHLOR,ISO/PF 2 MG/10 ML
0.5 SYRINGE (ML) INJECTION
Refills: 0 | Status: DISCONTINUED | OUTPATIENT
Start: 2025-04-14 | End: 2025-04-14

## 2025-04-14 RX ORDER — TAMSULOSIN HYDROCHLORIDE 0.4 MG/1
0.4 CAPSULE ORAL AT BEDTIME
Refills: 0 | Status: DISCONTINUED | OUTPATIENT
Start: 2025-04-14 | End: 2025-04-15

## 2025-04-14 RX ORDER — VANCOMYCIN HCL IN 5 % DEXTROSE 1.5G/250ML
1500 PLASTIC BAG, INJECTION (ML) INTRAVENOUS
Refills: 0 | Status: COMPLETED | OUTPATIENT
Start: 2025-04-14 | End: 2025-04-15

## 2025-04-14 RX ORDER — CELECOXIB 50 MG/1
200 CAPSULE ORAL EVERY 12 HOURS
Refills: 0 | Status: DISCONTINUED | OUTPATIENT
Start: 2025-04-14 | End: 2025-04-15

## 2025-04-14 RX ADMIN — LOSARTAN POTASSIUM 50 MILLIGRAM(S): 100 TABLET, FILM COATED ORAL at 05:07

## 2025-04-14 RX ADMIN — Medication 0.5 MILLIGRAM(S): at 20:31

## 2025-04-14 RX ADMIN — GABAPENTIN 300 MILLIGRAM(S): 400 CAPSULE ORAL at 05:07

## 2025-04-14 RX ADMIN — APREPITANT 40 MILLIGRAM(S): 40 CAPSULE ORAL at 13:24

## 2025-04-14 RX ADMIN — Medication 1 APPLICATION(S): at 16:18

## 2025-04-14 RX ADMIN — CITALOPRAM 10 MILLIGRAM(S): 20 TABLET ORAL at 13:21

## 2025-04-14 RX ADMIN — MUPIROCIN CALCIUM 1 APPLICATION(S): 20 CREAM TOPICAL at 05:07

## 2025-04-14 RX ADMIN — CELECOXIB 200 MILLIGRAM(S): 50 CAPSULE ORAL at 14:10

## 2025-04-14 RX ADMIN — Medication 20 MILLIGRAM(S): at 05:08

## 2025-04-14 RX ADMIN — Medication 25 GRAM(S): at 21:23

## 2025-04-14 RX ADMIN — CELECOXIB 200 MILLIGRAM(S): 50 CAPSULE ORAL at 13:20

## 2025-04-14 RX ADMIN — Medication 975 MILLIGRAM(S): at 13:20

## 2025-04-14 RX ADMIN — CARVEDILOL 6.25 MILLIGRAM(S): 3.12 TABLET, FILM COATED ORAL at 05:07

## 2025-04-14 RX ADMIN — EZETIMIBE 10 MILLIGRAM(S): 10 TABLET ORAL at 13:21

## 2025-04-14 RX ADMIN — TAMSULOSIN HYDROCHLORIDE 0.4 MILLIGRAM(S): 0.4 CAPSULE ORAL at 22:30

## 2025-04-14 RX ADMIN — SODIUM CHLORIDE 75 MILLILITER(S): 9 INJECTION, SOLUTION INTRAVENOUS at 22:36

## 2025-04-14 RX ADMIN — ROSUVASTATIN CALCIUM 40 MILLIGRAM(S): 5 TABLET, FILM COATED ORAL at 21:24

## 2025-04-14 RX ADMIN — OXYBUTYNIN CHLORIDE 5 MILLIGRAM(S): 5 TABLET, FILM COATED, EXTENDED RELEASE ORAL at 05:07

## 2025-04-14 RX ADMIN — Medication 1 APPLICATION(S): at 05:09

## 2025-04-14 RX ADMIN — Medication 975 MILLIGRAM(S): at 14:10

## 2025-04-14 RX ADMIN — MEMANTINE HYDROCHLORIDE 10 MILLIGRAM(S): 21 CAPSULE, EXTENDED RELEASE ORAL at 05:08

## 2025-04-14 RX ADMIN — Medication 125 MILLILITER(S): at 00:04

## 2025-04-14 RX ADMIN — Medication 0.5 MILLIGRAM(S): at 20:58

## 2025-04-14 NOTE — PROGRESS NOTE ADULT - SUBJECTIVE AND OBJECTIVE BOX
JAYME KIMZ  [id number]  69yMale    STATUS POST:  ACDF C5-C6, C6-C7 POD 12, S/P revision ACDF, repair of incidental durotomy, I and D of seroma today.  Localized swelling, mass and lump, neck has resolved.     Arthrodesis status-Z98.1    CARPAL TUNNEL SYNDROM,BILATERAL UPPER LIMBS    Other specified postprocedural states    Pain in right wrist-M25.531    Paresthesia of skin-R20.2    Radiculopathy, lumbar region-M54.16    FH: heart disease (Father)    Family history of CVA (Mother)    Handoff    MEWS Score    Myocardial infarction    PFO (patent foramen ovale)    Transient ischemic attack (TIA)    History of colitis    Disc disease with myelopathy, cervical    Radiculopathy, cervical    Obstructive sleep apnea on CPAP    HTN (hypertension)    Diabetes type 2    GERD (gastroesophageal reflux disease)    Alzheimer disease    Rectus diastasis    H/O cervical discectomy    H/O cervical discectomy    Neck swelling    H/O cervical discectomy    Revision of anterior cervical discectomy with fusion (ACDF)    H/O cardiac catheterization    History of surgical closure of patent foramen ovale (PFO)    S/P lumbar laminectomy    History of cholecystectomy    TROUBLE SWALLOWING    7    [id number]    Home Medications:  acetaminophen 325 mg oral tablet: 3 tab(s) orally every 8 hours (05 Apr 2025 09:28)  carvedilol 6.25 mg oral tablet: 1 tab(s) orally 2 times a day (02 Apr 2025 10:18)  citalopram 10 mg oral tablet: 1 tab(s) orally once a day (02 Apr 2025 10:18)  ezetimibe 10 mg oral tablet: 1 tab(s) orally once a day (02 Apr 2025 10:18)  fesoterodine 8 mg oral tablet, extended release: 1 tab(s) orally once a day (at bedtime) (02 Apr 2025 10:18)  gabapentin 300 mg oral tablet: orally 2 times a day (02 Apr 2025 10:18)  Gemtesa 75 mg oral tablet: 1 tab(s) orally once a day (02 Apr 2025 10:18)  Jardiance 25 mg oral tablet: 1 tab(s) orally once a day (02 Apr 2025 10:18)  losartan 50 mg oral tablet: 1 tab(s) orally once a day (02 Apr 2025 10:18)  memantine 10 mg oral tablet: 1 tab(s) orally 2 times a day (02 Apr 2025 10:18)  Mounjaro 7.5 mg/0.5 mL subcutaneous solution: 7.5 milligram(s) subcutaneously once a week sundays (02 Apr 2025 10:18)  multivitamin: once a day (02 Apr 2025 10:18)  pantoprazole 20 mg oral delayed release tablet: 1 tab(s) orally once a day (02 Apr 2025 10:18)  rosuvastatin 40 mg oral tablet: 1 tab(s) orally once a day (02 Apr 2025 10:18)  Systane preserved ophthalmic solution: 1 drop(s) in each eye 2 times a day (02 Apr 2025 10:18)  Toviaz 8 mg oral tablet, extended release: 1 tab(s) orally once a day (02 Apr 2025 10:18)      SUBJECTIVE: Patient seen and examined doing well  Pain controlled, positive posterior neck pain as expected     OBJECTIVE:   T(C): 36.2 (04-14-25 @ 19:55), Max: 36.7 (04-14-25 @ 04:05)  HR: 71 (04-14-25 @ 20:30) (64 - 76)  BP: 156/85 (04-14-25 @ 20:30) (145/84 - 173/81)  RR: 16 (04-14-25 @ 20:30) (14 - 20)  SpO2: 95% (04-14-25 @ 20:30) (92% - 100%)  Constitutional: Pleasant in no acute distress  Psych:A&Ox3  EENT: no dysphonia, no dyspnea.  mild dysphagia as expected  Abdominal: soft and supple non distended  Lymphatics: no pretibial pitting edema  Spine:          Dressing:  clean/dry/intact anterior cervical, no drain               Sensation:          Upper extremity          grossly intact manually,     distribution paresthesia on the    much improved          Lower extremity           grossly intact manually                               Motor:                   Lower and upper extremity grossly intact manually, positive posterior cervicalgia as expected            Vascular:[x] warm well perfused; capillary refill <3 seconds                A/P :69y MaleS/P revision ACDF, repair of incidental durotomy as above  POD#0  -    Pain control- multimodal approach.  Avoid NSAIDS x 6 weeks post op since they may deter fusion.  Focus on muscle relaxers, heat.   - Will start vanco and zosyn per medicine team recommendations, ortho team will call ID consult tomorrow morning.  Follow intraoperative culture results x 3.  ESR, C-RP ordered for tomorrow and in 48 hours.    -    DVT ppx: [ x]SCDs, early ambulation,  ASA 81 mg daily x 28 days post op  -    Periop abx:  Ancef [x ]  vanco.       -    Likely home 3-5 days  -      change dressing prior to discharge home and PRN   -    Resume home meds as appropriate  -PT /OT WBAT, balance and gait tomorrow.  HOB 90 degrees AT ALL TIMES,  -brace cervical collar with OOB is for comfort and not mandatory, never to be worn in bed, with eating or hygeine but should be worn when in a motor vehicle x 28 days post op.   -medical follow up Hospitalist team  - HTN- restart antihypertensives with parameters  - anxiety- continue citalapram  - Dementia- continue mematadine  - BPH- continue flomax  - HLD- continue statin, ezetimibe  - MILLICENT- , CPAP.  - patient should be reassured that it is normal to have dysphagia post operative, encourage soft diet, cutting food in small pieces.  cepacol losenges ordered.  Patient may advance from soft/bite size diet to regular consistency tomorrow/POD 1 lunch if he has no dysphagia

## 2025-04-14 NOTE — BRIEF OPERATIVE NOTE - NSICDXBRIEFPROCEDURE_GEN_ALL_CORE_FT
PROCEDURES:  Revision of anterior cervical discectomy with fusion (ACDF) 14-Apr-2025 19:10:51  Alessio Bass  
PROCEDURES:  Revision of anterior cervical discectomy with fusion (ACDF) 14-Apr-2025 19:10:51  Alessio Bass

## 2025-04-14 NOTE — ASU PREOP CHECKLIST - HAIR REMOVAL
HEMATOLOGY ONCOLOGY FOLLOWUP     CHIEF COMPLAINT: CML    HISTORY OF PRESENT ILLNESS:  Ms. Banks returns for ongoing management of CML.  She notes a small cough, intermittent, not worse with lying down.       She is caring for her mother.    She is tolerating dasatinib well.  No SOB or other systemic symptoms.  She notes no issues on dasatinib and her mild rash on her upper chest and arms is improved.    She feels well and notes no drenching sweats, bruising or bleeding. There is occasional flushing in her face.    She is exercising three times weekly.     PAST MEDICAL AND SURGICAL HISTORY:  Unchanged from prior.    HEMATOLOGIC HISTORY:  1. 07/28/2015, found to have leukocytosis with white blood cell count 20,700 per microliter, the majority of which were neutrophils with some early forms including metas and myelocytes as well as increased basophils.  No anemia, thrombocytopenia.  Leukocytosis, persistent on repeat laboratory testing.  2. 9/17/15:  Bone marrow biopsy revealed \"Markedly hypercellular bone marrow with granulocytic and megakaryocytic hyperplasia, and with a mild bone marrow basophilia without an increase in blasts, highly suggestive of chronic myelogenous leukemia, chronic phase.   - Peripheral blood with moderate to marked left-shifted neutrophilia and a mild basophilia, eosinophilia, monocytosis and lymphocytosis.   - BCR/ABL positive  3. 10/12/15:  Dasatinib  4. 01/10/17:  Bone marrow biopsy.  No morphologic or cytogenetic evidence of chronic myelogenous leukemia.  FISH negative for BCR/ABL.    ALLERGIES, MEDICATIONS, REVIEW OF SYSTEMS:  Documented in the MA notes and accepted.    PHYSICAL EXAMINATION:  /70 Comment: 141/65  Pulse 75   Temp 97.9 °F (36.6 °C) (Oral)   Wt 56.6 kg   BMI 23.56 kg/m² CONSTITUTIONAL:  Alert and oriented.  In no apparent distress.  Well nourished.  Well developed.   HEAD: Normocephalic; atraumatic.   EYES:  The sclerae are anicteric.  Pupils are  hair removal not indicated equal.  Hematologic/Lymphatic: No petechia or purpura. No cervical, supraclavicular, infraclavicular, occipital, submental, axillary adenopathy.  Respiratory: Lungs are clear to auscultation bilaterally, with no dullness to percussion or tenderness to the bony thorax.  Cardiovascular: Regular rate, with no murmurs, rubs or clicks. The precordium is quiet.  Abdomen: Abdomen is soft, nontender, nondistended, with normal bowel sounds and no hepatosplenomegaly or masses.  Extremities: No cyanosis, clubbing or edema.    PSYCHIATRIC:  Alert and oriented times three.  Coherent speech.  Verbalizes understanding of our discussions today.    LABORATORY DATA:  reviewed with normal CBC. CMP, LDH and BCR/ABL transcripts pending but previously with MMR.    ASSESSMENT:  Chronic phase chronic myelogenous leukemia: Based on the Americo and Hasford scores, she has intermediate risk disease. She is tolerating dasatinib well with a major molecular response.  She refused bone marrow biopsy for restaging and would be willing to reconsider this in the future if needed. She'll continue dasatinib for now. Labs and bone marrow biopsy previously showed a complete response.    Since she is on thyroid hormone replacement, she will need thyroid hormone levels measured regularly and I will recheck TSH in 3 months.    Thank you again for the opportunity to participate in her care.      cc: Damien Alba MD

## 2025-04-14 NOTE — BRIEF OPERATIVE NOTE - NSICDXBRIEFPREOP_GEN_ALL_CORE_FT
PRE-OP DIAGNOSIS:  H/O cervical discectomy 14-Apr-2025 19:12:12  Alessio Bass  
PRE-OP DIAGNOSIS:  H/O cervical discectomy 14-Apr-2025 19:12:12  Alessio Bass

## 2025-04-14 NOTE — BRIEF OPERATIVE NOTE - NSICDXBRIEFPOSTOP_GEN_ALL_CORE_FT
POST-OP DIAGNOSIS:  H/O cervical discectomy 14-Apr-2025 19:12:20  Alessio Bass  
POST-OP DIAGNOSIS:  H/O cervical discectomy 14-Apr-2025 19:12:20  Alessio Bass

## 2025-04-14 NOTE — BRIEF OPERATIVE NOTE - OPERATION/FINDINGS
Cervical I&D, removal of hardware, C5-C6 durotomy visualized, duragen and duraseal revision ACDF C5-C7
  I assisted all aspects of operative positioning including placing shoulder bolster, taping of shoulders in a slightly dependent position, maintenance of head and an extended position. I obtained fluoroscopic imaging confirming the appropriate visualization of levels.  I participated in operative time out.   I cleansed the operative area with Betadine and RN then prepped with DuraPrep. I participated in draping with blue drapes and ioban and then ensured that drapes were appropriately positioned.I assisted with surgical exposure to the pretracheal prevertebral fascia using toothless pickups and then Cloward retractors during which time I assisted with maintenance of hemostasis with Surgi-Jad, Ray tech and suction, intermittent irrigation. I assisted with sequential placement of [place] pins.  I assisted with removal of implant, irrigation, and repair of durotomy at C5-C6.  I assisted with I assisted with plate replacement over the anterior cervical spine by using Cloward retraction, intermittent irrigation, consistent suction.  I verified that screws were placed, tightened and torqued per protocol. Copious irrigation was then used, duraseal was again placed, final hemostasis was performed with FloSeal, Ray-Bennett, and bipolar cautery. Fluoroscopic imaging confirmed appropriate placement of implants. I confirmed with operating surgeon and neuro monitoring that final neuro monitoring was stable. Closure was performed platysma with 2-0 vicryl, superficial fascia with 3-0 vicryl and skin with 4-0 MONOCRYL.

## 2025-04-14 NOTE — PROGRESS NOTE ADULT - SUBJECTIVE AND OBJECTIVE BOX
CC:     HPI:  70 y/o male with Hx significant for MI, s/p PCI x 11, TIA, PFO s/p PFO closure, HTN, HLD, DM2, colitis, alzheimer's, and MILLICENT on CPAP, he was previous diagnosed in 2023 with cervical myelopathy and lumbar disc disease at which time he underwent a lumbar laminectomy at \[facility]\"". He stated he noted an improvement in his neck pain after surgery but in the past couple of months had noted a progressive worsening in pain, stated he was having neck pain, he came in here for elective anterior cervical discectomy and fusion C5-C6, C5-C7 with Dr. Spencer on 4/2/25 s/p surgery. He was discharged after surgery, now came back with draining wound on his neck, likely going to OR for wound exploration/ I&D, medicine consulted for Medical Management.     INTERVAL HPI/OVERNIGHT EVENTS: Patient seen and examined sitting up in the chair.  Patient denies any neck pain.  Patient states he is "hungry."  Patient denies any headache, dizziness, SOB, CP, abdominal pain, nausea, vomiting, dysuria.  Other ROS reviewed and are negative.     Vital Signs Last 24 Hrs  T(C): 36.4 (14 Apr 2025 08:57), Max: 38.7 (13 Apr 2025 14:29)  T(F): 97.5 (14 Apr 2025 08:57), Max: 101.6 (13 Apr 2025 14:29)  HR: 66 (14 Apr 2025 08:57) (66 - 105)  BP: 164/79 (14 Apr 2025 08:57) (147/81 - 176/78)  BP(mean): 102 (13 Apr 2025 14:29) (92 - 102)  RR: 18 (14 Apr 2025 08:57) (18 - 20)  SpO2: 96% (14 Apr 2025 08:57) (91% - 97%)    Parameters below as of 14 Apr 2025 08:57  Patient On (Oxygen Delivery Method): room air      I&O's Detail    13 Apr 2025 07:01  -  14 Apr 2025 07:00  --------------------------------------------------------  IN:    Oral Fluid: 230 mL    sodium chloride 0.9%: 875 mL  Total IN: 1105 mL    OUT:    Voided (mL): 700 mL  Total OUT: 700 mL    Total NET: 405 mL      PHYSICAL EXAM:  GENERAL: NAD  HEAD:  Atraumatic, Normocephalic  NECK: Supple, No JVD, Normal thyroid; Anterior neck with clean dressing  NERVOUS SYSTEM:  Alert & Oriented X3, Good concentration; Motor Strength 5/5 B/L upper and lower extremities  CHEST/LUNG: Clear to auscultation bilaterally  HEART: Regular rate and rhythm; No murmurs, rubs, or gallops  ABDOMEN: Soft, Nontender, Nondistended; Bowel sounds present  EXTREMITIES:  2+ Peripheral Pulses  SKIN: No rashes or lesions                                13.7   14.83 )-----------( 234      ( 14 Apr 2025 05:00 )             42.5     14 Apr 2025 05:00    138    |  101    |  19.7   ----------------------------<  144    4.2     |  22.0   |  0.90     Ca    8.8        14 Apr 2025 05:00    TPro  6.9    /  Alb  3.7    /  TBili  0.3    /  DBili  x      /  AST  16     /  ALT  24     /  AlkPhos  58     14 Apr 2025 05:00    PT/INR - ( 13 Apr 2025 12:29 )   PT: 13.2 sec;   INR: 1.14 ratio           CAPILLARY BLOOD GLUCOSE  POCT Blood Glucose.: 138 mg/dL (14 Apr 2025 07:38)  POCT Blood Glucose.: 170 mg/dL (13 Apr 2025 21:02)  POCT Blood Glucose.: 204 mg/dL (13 Apr 2025 17:55)  POCT Blood Glucose.: 212 mg/dL (13 Apr 2025 17:15)  POCT Blood Glucose.: 196 mg/dL (13 Apr 2025 12:52)    LIVER FUNCTIONS - ( 14 Apr 2025 05:00 )  Alb: 3.7 g/dL / Pro: 6.9 g/dL / ALK PHOS: 58 U/L / ALT: 24 U/L / AST: 16 U/L / GGT: x           Urinalysis Basic - ( 14 Apr 2025 05:00 )    Color: x / Appearance: x / SG: x / pH: x  Gluc: 144 mg/dL / Ketone: x  / Bili: x / Urobili: x   Blood: x / Protein: x / Nitrite: x   Leuk Esterase: x / RBC: x / WBC x   Sq Epi: x / Non Sq Epi: x / Bacteria: x        MEDICATIONS  (STANDING):  acetaminophen     Tablet .. 975 milliGRAM(s) Oral once  aprepitant 40 milliGRAM(s) Oral once  carvedilol 6.25 milliGRAM(s) Oral every 12 hours  celecoxib 200 milliGRAM(s) Oral once  citalopram 10 milliGRAM(s) Oral daily  dextrose 5%. 1000 milliLiter(s) (50 mL/Hr) IV Continuous <Continuous>  dextrose 5%. 1000 milliLiter(s) (100 mL/Hr) IV Continuous <Continuous>  dextrose 50% Injectable 25 Gram(s) IV Push once  dextrose 50% Injectable 12.5 Gram(s) IV Push once  dextrose 50% Injectable 25 Gram(s) IV Push once  ezetimibe 10 milliGRAM(s) Oral daily  gabapentin 300 milliGRAM(s) Oral two times a day  glucagon  Injectable 1 milliGRAM(s) IntraMuscular once  insulin lispro (ADMELOG) corrective regimen sliding scale   SubCutaneous three times a day before meals  losartan 50 milliGRAM(s) Oral daily  memantine 10 milliGRAM(s) Oral two times a day  mupirocin 2% Ointment 1 Application(s) Both Nostrils two times a day  oxybutynin 5 milliGRAM(s) Oral two times a day  pantoprazole    Tablet 20 milliGRAM(s) Oral before breakfast  povidone iodine 10% Nasal Swab 1 Application(s) Both Nostrils once  rosuvastatin 40 milliGRAM(s) Oral at bedtime  sodium chloride 0.9%. 1000 milliLiter(s) (125 mL/Hr) IV Continuous <Continuous>  tamsulosin 0.4 milliGRAM(s) Oral at bedtime    MEDICATIONS  (PRN):  acetaminophen     Tablet .. 975 milliGRAM(s) Oral every 8 hours PRN Temp greater or equal to 38C (100.4F), Mild Pain (1 - 3)  dextrose Oral Gel 15 Gram(s) Oral once PRN Blood Glucose LESS THAN 70 milliGRAM(s)/deciliter      RADIOLOGY & ADDITIONAL TESTS:  < from: CT Neck Soft Tissue No Cont (04.13.25 @ 01:00) >  ACC: [id number] EXAM:  CT NECK SOFT TISSUE   ORDERED BY: ARTHUR LORENZO     PROCEDURE DATE:  04/13/2025          INTERPRETATION:  INDICATIONS:  69y Male hx of HTN, COPD, DM, CAD, MI, s/p   C5-C7 ACDF 4/2/25 presenting to the ED with complaints of increased   drainage from incision and difficulty swallowing due to swelling   sensation in his neck. Pt has not been tolerating solids, only tolerates   jello and water WITH discomfort. No CP, SOB, bleeding, fever and chills.    TECHNIQUE: CT soft tissueneck without IV contrast.  Axial images were obtained following the intravenous administration of   contrast material. Sagittal and coronal reformatted images were obtained.    COMPARISON EXAMINATION:  None.    FINDINGS:  Suboptimal assessment without intravenous contrast.    AERODIGESTIVE TRACT:  The nasopharynx and oropharynx are symmetric. Small   amount of secretions within the bilateral vallecula. Epiglottis is not   thickened. The larynx is symmetric.  LYMPH NODES:  Asymmetric left level IVlymph node measuring 1.3 cm.  PAROTID GLANDS:  Unremarkable  SUBMANDIBULAR GLANDS:  Unremarkable  THYROID GLAND:  Asymmetric edema left thyroid bed, likely residual   postsurgical change.  VISUALIZED PARANASAL SINUSES:  Clear.  VISUALIZED TYMPANOMASTOID CAVITIES: Clear.  BONES:  Status post ACDF from C5 through C7 with intervertebral disc   spacers C5/C6 and C6/C7. Hardware is intact. Degenerative changes. No   acute fracture. No bony erosive changes.  MISCELLANEOUS:  Minimal prevertebral edema as would be expected for the   recent postoperative state after ACDF. No organized fluid collection in   the retropharynx or prevertebral space. Asymmetric edema in the left   strap muscles and subcutaneous fat stranding in the left neck at the   levelsurgical incision without organized fluid collection. Asymmetric   1.3 cm nodular opacity in the right lung apex (3/77).    IMPRESSION:  Status post ACDF C5-C7. Mild prevertebral edema and swelling in the left   lower neck near the incision site likely residual postsurgical changes.   No organized fluid collection. Clinical correlation for cellulitis   incision site is recommended.    1.3 cm right apical nodular opacity versus scarring. Correlation with   prior imaging, if available is recommended. Consider follow-up   nonemergent dedicated chest CT if no prior imaging available.    --- End of Report ---            GLO DOZIER MD; Attending Radiologist  This document has been electronically signed. Apr 13 2025  2:36AM    < end of copied text >

## 2025-04-15 ENCOUNTER — TRANSCRIPTION ENCOUNTER (OUTPATIENT)
Age: 70
End: 2025-04-15

## 2025-04-15 VITALS
HEART RATE: 71 BPM | TEMPERATURE: 98 F | DIASTOLIC BLOOD PRESSURE: 70 MMHG | SYSTOLIC BLOOD PRESSURE: 150 MMHG | RESPIRATION RATE: 17 BRPM | OXYGEN SATURATION: 91 %

## 2025-04-15 LAB
CRP SERPL-MCNC: 21 MG/L — HIGH
ERYTHROCYTE [SEDIMENTATION RATE] IN BLOOD: 12 MM/HR — SIGNIFICANT CHANGE UP (ref 0–15)
GLUCOSE BLDC GLUCOMTR-MCNC: 110 MG/DL — HIGH (ref 70–99)
GLUCOSE BLDC GLUCOMTR-MCNC: 144 MG/DL — HIGH (ref 70–99)
GLUCOSE BLDC GLUCOMTR-MCNC: 152 MG/DL — HIGH (ref 70–99)
GLUCOSE BLDC GLUCOMTR-MCNC: 184 MG/DL — HIGH (ref 70–99)

## 2025-04-15 PROCEDURE — 96376 TX/PRO/DX INJ SAME DRUG ADON: CPT

## 2025-04-15 PROCEDURE — G0545: CPT

## 2025-04-15 PROCEDURE — 87070 CULTURE OTHR SPECIMN AEROBIC: CPT

## 2025-04-15 PROCEDURE — 22551 ARTHRD ANT NTRBDY CERVICAL: CPT | Mod: 78

## 2025-04-15 PROCEDURE — 87640 STAPH A DNA AMP PROBE: CPT

## 2025-04-15 PROCEDURE — 93005 ELECTROCARDIOGRAM TRACING: CPT

## 2025-04-15 PROCEDURE — 82962 GLUCOSE BLOOD TEST: CPT

## 2025-04-15 PROCEDURE — 94660 CPAP INITIATION&MGMT: CPT

## 2025-04-15 PROCEDURE — 80048 BASIC METABOLIC PNL TOTAL CA: CPT

## 2025-04-15 PROCEDURE — C1713: CPT

## 2025-04-15 PROCEDURE — 22853 INSJ BIOMECHANICAL DEVICE: CPT | Mod: 78

## 2025-04-15 PROCEDURE — 22010 I&D P-SPINE C/T/CERV-THOR: CPT | Mod: 78

## 2025-04-15 PROCEDURE — 99285 EMERGENCY DEPT VISIT HI MDM: CPT

## 2025-04-15 PROCEDURE — 96372 THER/PROPH/DIAG INJ SC/IM: CPT | Mod: XU

## 2025-04-15 PROCEDURE — C1889: CPT

## 2025-04-15 PROCEDURE — 85610 PROTHROMBIN TIME: CPT

## 2025-04-15 PROCEDURE — 96374 THER/PROPH/DIAG INJ IV PUSH: CPT

## 2025-04-15 PROCEDURE — G0378: CPT

## 2025-04-15 PROCEDURE — C1763: CPT

## 2025-04-15 PROCEDURE — 99232 SBSQ HOSP IP/OBS MODERATE 35: CPT | Mod: FS

## 2025-04-15 PROCEDURE — 86900 BLOOD TYPING SEROLOGIC ABO: CPT

## 2025-04-15 PROCEDURE — 22855 REMOVAL ANTERIOR INSTRMJ: CPT | Mod: 78

## 2025-04-15 PROCEDURE — 99223 1ST HOSP IP/OBS HIGH 75: CPT

## 2025-04-15 PROCEDURE — 22845 INSERT SPINE FIXATION DEVICE: CPT | Mod: 78,59

## 2025-04-15 PROCEDURE — 86850 RBC ANTIBODY SCREEN: CPT

## 2025-04-15 PROCEDURE — 76000 FLUOROSCOPY <1 HR PHYS/QHP: CPT

## 2025-04-15 PROCEDURE — 87075 CULTR BACTERIA EXCEPT BLOOD: CPT

## 2025-04-15 PROCEDURE — 87040 BLOOD CULTURE FOR BACTERIA: CPT

## 2025-04-15 PROCEDURE — 87641 MR-STAPH DNA AMP PROBE: CPT

## 2025-04-15 PROCEDURE — 70490 CT SOFT TISSUE NECK W/O DYE: CPT | Mod: MC

## 2025-04-15 PROCEDURE — 80053 COMPREHEN METABOLIC PANEL: CPT

## 2025-04-15 PROCEDURE — 85652 RBC SED RATE AUTOMATED: CPT

## 2025-04-15 PROCEDURE — 85027 COMPLETE CBC AUTOMATED: CPT

## 2025-04-15 PROCEDURE — 87186 SC STD MICRODIL/AGAR DIL: CPT

## 2025-04-15 PROCEDURE — 87077 CULTURE AEROBIC IDENTIFY: CPT

## 2025-04-15 PROCEDURE — 36415 COLL VENOUS BLD VENIPUNCTURE: CPT

## 2025-04-15 PROCEDURE — 86140 C-REACTIVE PROTEIN: CPT

## 2025-04-15 PROCEDURE — 71045 X-RAY EXAM CHEST 1 VIEW: CPT

## 2025-04-15 PROCEDURE — 86901 BLOOD TYPING SEROLOGIC RH(D): CPT

## 2025-04-15 RX ORDER — OXYCODONE HYDROCHLORIDE 30 MG/1
1 TABLET ORAL
Qty: 20 | Refills: 0
Start: 2025-04-15 | End: 2025-04-19

## 2025-04-15 RX ORDER — SULFAMETHOXAZOLE/TRIMETHOPRIM 800-160 MG
1 TABLET ORAL
Qty: 14 | Refills: 0
Start: 2025-04-15 | End: 2025-04-21

## 2025-04-15 RX ORDER — SENNOSIDES, DOCUSATE SODIUM 8.6; 5 MG/1; MG/1
2 TABLET ORAL
Qty: 10 | Refills: 0
Start: 2025-04-15 | End: 2025-04-19

## 2025-04-15 RX ORDER — ACETAMINOPHEN 500 MG/5ML
3 LIQUID (ML) ORAL
Qty: 0 | Refills: 0 | DISCHARGE
Start: 2025-04-15

## 2025-04-15 RX ORDER — VANCOMYCIN HCL IN 5 % DEXTROSE 1.5G/250ML
1250 PLASTIC BAG, INJECTION (ML) INTRAVENOUS EVERY 12 HOURS
Refills: 0 | Status: DISCONTINUED | OUTPATIENT
Start: 2025-04-15 | End: 2025-04-15

## 2025-04-15 RX ORDER — NALOXONE HYDROCHLORIDE 0.4 MG/ML
1 INJECTION, SOLUTION INTRAMUSCULAR; INTRAVENOUS; SUBCUTANEOUS
Qty: 1 | Refills: 0
Start: 2025-04-15

## 2025-04-15 RX ORDER — ASPIRIN 325 MG
1 TABLET ORAL
Qty: 28 | Refills: 0
Start: 2025-04-15 | End: 2025-05-12

## 2025-04-15 RX ORDER — B1/B2/B3/B5/B6/B12/VIT C/FOLIC 500-0.5 MG
0 TABLET ORAL
Refills: 0 | DISCHARGE

## 2025-04-15 RX ORDER — FESOTERODINE FUMARATE 4 MG/1
1 TABLET, FILM COATED, EXTENDED RELEASE ORAL
Refills: 0 | DISCHARGE

## 2025-04-15 RX ADMIN — Medication 25 GRAM(S): at 05:20

## 2025-04-15 RX ADMIN — Medication 81 MILLIGRAM(S): at 11:27

## 2025-04-15 RX ADMIN — Medication 975 MILLIGRAM(S): at 13:30

## 2025-04-15 RX ADMIN — GABAPENTIN 300 MILLIGRAM(S): 400 CAPSULE ORAL at 05:19

## 2025-04-15 RX ADMIN — Medication 40 MILLIGRAM(S): at 05:20

## 2025-04-15 RX ADMIN — Medication 2000 MILLIGRAM(S): at 01:18

## 2025-04-15 RX ADMIN — Medication 975 MILLIGRAM(S): at 06:19

## 2025-04-15 RX ADMIN — MEMANTINE HYDROCHLORIDE 10 MILLIGRAM(S): 21 CAPSULE, EXTENDED RELEASE ORAL at 17:25

## 2025-04-15 RX ADMIN — Medication 975 MILLIGRAM(S): at 05:19

## 2025-04-15 RX ADMIN — METHOCARBAMOL 500 MILLIGRAM(S): 500 TABLET, FILM COATED ORAL at 13:30

## 2025-04-15 RX ADMIN — CELECOXIB 200 MILLIGRAM(S): 50 CAPSULE ORAL at 06:19

## 2025-04-15 RX ADMIN — CITALOPRAM 10 MILLIGRAM(S): 20 TABLET ORAL at 11:27

## 2025-04-15 RX ADMIN — INSULIN LISPRO 2: 100 INJECTION, SOLUTION INTRAVENOUS; SUBCUTANEOUS at 11:30

## 2025-04-15 RX ADMIN — GABAPENTIN 300 MILLIGRAM(S): 400 CAPSULE ORAL at 17:26

## 2025-04-15 RX ADMIN — CELECOXIB 200 MILLIGRAM(S): 50 CAPSULE ORAL at 17:25

## 2025-04-15 RX ADMIN — CARVEDILOL 6.25 MILLIGRAM(S): 3.12 TABLET, FILM COATED ORAL at 17:25

## 2025-04-15 RX ADMIN — CELECOXIB 200 MILLIGRAM(S): 50 CAPSULE ORAL at 05:19

## 2025-04-15 RX ADMIN — METHOCARBAMOL 500 MILLIGRAM(S): 500 TABLET, FILM COATED ORAL at 05:19

## 2025-04-15 RX ADMIN — Medication 2000 MILLIGRAM(S): at 09:34

## 2025-04-15 RX ADMIN — INSULIN LISPRO 2: 100 INJECTION, SOLUTION INTRAVENOUS; SUBCUTANEOUS at 17:26

## 2025-04-15 RX ADMIN — CELECOXIB 200 MILLIGRAM(S): 50 CAPSULE ORAL at 18:07

## 2025-04-15 RX ADMIN — EZETIMIBE 10 MILLIGRAM(S): 10 TABLET ORAL at 11:26

## 2025-04-15 RX ADMIN — OXYBUTYNIN CHLORIDE 5 MILLIGRAM(S): 5 TABLET, FILM COATED, EXTENDED RELEASE ORAL at 17:26

## 2025-04-15 RX ADMIN — Medication 25 GRAM(S): at 13:31

## 2025-04-15 RX ADMIN — Medication 300 MILLIGRAM(S): at 05:30

## 2025-04-15 RX ADMIN — MEMANTINE HYDROCHLORIDE 10 MILLIGRAM(S): 21 CAPSULE, EXTENDED RELEASE ORAL at 05:19

## 2025-04-15 RX ADMIN — Medication 975 MILLIGRAM(S): at 14:16

## 2025-04-15 RX ADMIN — OXYBUTYNIN CHLORIDE 5 MILLIGRAM(S): 5 TABLET, FILM COATED, EXTENDED RELEASE ORAL at 05:19

## 2025-04-15 RX ADMIN — CARVEDILOL 6.25 MILLIGRAM(S): 3.12 TABLET, FILM COATED ORAL at 05:19

## 2025-04-15 NOTE — PHYSICAL THERAPY INITIAL EVALUATION ADULT - PERTINENT HX OF CURRENT PROBLEM, REHAB EVAL
68 y/o male with Hx significant for MI, s/p PCI x 11, TIA, PFO s/p PFO closure, HTN, HLD, DM2, colitis, alzheimer's, and MILLICENT on CPAP, he was previous diagnosed in 2023 with cervical myelopathy and lumbar disc disease at which time he underwent a lumbar laminectomy at [facility]. Now s/p revision ACDF with durotomy repair 4/14/25

## 2025-04-15 NOTE — DISCHARGE NOTE PROVIDER - NSDCFUSCHEDAPPT_GEN_ALL_CORE_FT
Tyrell Spencer [facility]  ORTHOSURG [address]  Scheduled Appointment: 05/02/2025    Titus Light  [place] [facility]  ENDOCRIN [address]  Scheduled Appointment: 05/27/2025     Tyrell Spencer [facility]  ORTHOSURG [address]  Scheduled Appointment: 04/25/2025    Titus Light  [place] [facility]  ENDOCRIN [address]  Scheduled Appointment: 05/27/2025

## 2025-04-15 NOTE — DISCHARGE NOTE PROVIDER - HOSPITAL COURSE
Patient was admitted for revision anterior cervical discectomy and fusion on 04/14/2025. The post-operative course was uneventful.  The surgical dressing was changed and the drain was removed uneventfully. PT/OT worked with patient for acute rehabilitation process. The pain was adequately controlled and patient is able to go home as she can accomplish ADL with help from family member at this time.  The cervical collar was worn for comfort when out of bed. Patient was admitted for revision anterior cervical discectomy and fusion on 04/14/2025. The post-operative course was uneventful.  The surgical dressing was changed and the drain was removed uneventfully. PT worked with patient for acute rehabilitation process. The pain was adequately controlled and patient is able to go home as she can accomplish ADL with help from family member at this time.  The cervical collar was worn for comfort when out of bed.

## 2025-04-15 NOTE — OCCUPATIONAL THERAPY INITIAL EVALUATION ADULT - RANGE OF MOTION EXAMINATION, UPPER EXTREMITY
except bilat shoulders to 90 degrees due to cervical spine precautions/bilateral UE Active ROM was WFL  (within functional limits)

## 2025-04-15 NOTE — OCCUPATIONAL THERAPY INITIAL EVALUATION ADULT - ADDITIONAL COMMENTS
Pt has tub with curtain  Pt has shower stall with curtain  Pt has RW, cane, shower chair, raised toilet seat  Pt is right handed

## 2025-04-15 NOTE — PROGRESS NOTE ADULT - SUBJECTIVE AND OBJECTIVE BOX
JAYME DE JESUS    [id number]    History: 69y Male is status post revision ACDF C5-7. durotomy repair POD # 1.  Patient is doing well and is comfortable. The patient's pain is controlled using the prescribed pain medications. The patient is participating in physical therapy. Denies nausea, vomiting, chest pain, shortness of breath, abdominal pain or fever. No new complaints. Pre-op b/l UE pain/weakness is improved.    Vital Signs Last 24 Hrs  T(C): 36.4 (15 Apr 2025 04:05), Max: 36.7 (14 Apr 2025 22:25)  T(F): 97.5 (15 Apr 2025 04:05), Max: 98.1 (14 Apr 2025 22:25)  HR: 66 (15 Apr 2025 04:05) (61 - 76)  BP: 166/78 (15 Apr 2025 04:05) (145/84 - 173/81)  BP(mean): 109 (14 Apr 2025 22:00) (102 - 109)  RR: 17 (15 Apr 2025 04:05) (14 - 20)  SpO2: 95% (15 Apr 2025 04:05) (92% - 100%)    Parameters below as of 15 Apr 2025 04:05  Patient On (Oxygen Delivery Method): room air                              13.7   14.83 )-----------( 234      ( 14 Apr 2025 05:00 )             42.5     04-14    138  |  101  |  19.7  ----------------------------<  144[H]  4.2   |  22.0  |  0.90    Ca    8.8      14 Apr 2025 05:00    TPro  6.9  /  Alb  3.7  /  TBili  0.3[L]  /  DBili  x   /  AST  16  /  ALT  24  /  AlkPhos  58  04-14      MEDICATIONS  (STANDING):  acetaminophen     Tablet .. 975 milliGRAM(s) Oral every 8 hours  aspirin enteric coated 81 milliGRAM(s) Oral daily  carvedilol 6.25 milliGRAM(s) Oral every 12 hours  ceFAZolin  Injectable. 2000 milliGRAM(s) IV Push once  ceFAZolin  Injectable. 2000 milliGRAM(s) IV Push <User Schedule>  celecoxib 200 milliGRAM(s) Oral every 12 hours  citalopram 10 milliGRAM(s) Oral daily  dextrose 5%. 1000 milliLiter(s) (100 mL/Hr) IV Continuous <Continuous>  dextrose 5%. 1000 milliLiter(s) (50 mL/Hr) IV Continuous <Continuous>  dextrose 50% Injectable 25 Gram(s) IV Push once  dextrose 50% Injectable 12.5 Gram(s) IV Push once  dextrose 50% Injectable 25 Gram(s) IV Push once  ezetimibe 10 milliGRAM(s) Oral daily  gabapentin 300 milliGRAM(s) Oral two times a day  glucagon  Injectable 1 milliGRAM(s) IntraMuscular once  insulin lispro (ADMELOG) corrective regimen sliding scale   SubCutaneous three times a day before meals  insulin lispro (ADMELOG) corrective regimen sliding scale   SubCutaneous at bedtime  lactated ringers. 1000 milliLiter(s) (75 mL/Hr) IV Continuous <Continuous>  memantine 10 milliGRAM(s) Oral two times a day  methocarbamol 500 milliGRAM(s) Oral every 8 hours  oxybutynin 5 milliGRAM(s) Oral two times a day  pantoprazole    Tablet 40 milliGRAM(s) Oral before breakfast  piperacillin/tazobactam IVPB.. 3.375 Gram(s) IV Intermittent every 8 hours  rosuvastatin 40 milliGRAM(s) Oral at bedtime  senna 2 Tablet(s) Oral at bedtime  tamsulosin 0.4 milliGRAM(s) Oral at bedtime  vancomycin  IVPB 1500 milliGRAM(s) IV Intermittent every 12 hours    MEDICATIONS  (PRN):  aluminum hydroxide/magnesium hydroxide/simethicone Suspension 30 milliLiter(s) Oral every 12 hours PRN Indigestion  benzocaine/menthol Lozenge 1 Lozenge Oral every 2 hours PRN Sore Throat  dextrose Oral Gel 15 Gram(s) Oral once PRN Blood Glucose LESS THAN 70 milliGRAM(s)/deciliter  magnesium hydroxide Suspension 30 milliLiter(s) Oral every 12 hours PRN Constipation  melatonin 3 milliGRAM(s) Oral at bedtime PRN Insomnia  ondansetron Injectable 4 milliGRAM(s) IV Push every 6 hours PRN Nausea and/or Vomiting  ondansetron Injectable 4 milliGRAM(s) IV Push every 6 hours PRN Nausea and/or Vomiting  oxyCODONE    IR 10 milliGRAM(s) Oral every 3 hours PRN Severe Pain (7 - 10)  oxyCODONE    IR 5 milliGRAM(s) Oral every 3 hours PRN Moderate Pain (4 - 6)      Vital Signs Last 24 Hrs  T(C): 36.4 (15 Apr 2025 04:05), Max: 36.7 (14 Apr 2025 22:25)  T(F): 97.5 (15 Apr 2025 04:05), Max: 98.1 (14 Apr 2025 22:25)  HR: 66 (15 Apr 2025 04:05) (61 - 76)  BP: 166/78 (15 Apr 2025 04:05) (145/84 - 173/81)  BP(mean): 109 (14 Apr 2025 22:00) (102 - 109)  RR: 17 (15 Apr 2025 04:05) (14 - 20)  SpO2: 95% (15 Apr 2025 04:05) (92% - 100%)    Parameters below as of 15 Apr 2025 04:05  Patient On (Oxygen Delivery Method): room air      Daily Height in cm: 177.8 (14 Apr 2025 16:15)    Daily     Appearance: Alert, responsive, in no acute distress.    Skin: no rash on visible skin. Skin is clean, dry and intact. No bleeding. No abrasions. No ulcerations.    Musculoskeletal:         Cervical: Mepilex dressing clean, dry, intact.       Left Upper Extremity:  Sensation is grossly intact to light touch. 2+ distal pulses. Cap refill < 2 sec.        Right Upper Extremity: Sensation is grossly intact to light touch. 2+ distal pulses. Cap refill < 2 sec.             Motor exam: [  ]          [ ] Upper extremity              Bi(c5)  WE(c6)  EE(c7)   FF(c8)                                                R         5/5        5/5        5/5       5/5                                               L          5/5        5/5        5/5       5/5      Primary Orthopedic Assessment:  • 69y Male is status post revision ACDF C5-7. durotomy repair POD # 1.    Secondary  Orthopedic Assessment(s):   •     Secondary  Medical Assessment(s):   •     Plan:   • DVT prophylaxis as prescribed, including use of compression devices and ankle pumps  • Continue physical therapy  • Out of Bed as tolerated with assistance of a walker  • Incentive spirometry encouraged  • Pain control as clinically indicated  • Continue Vanco/Zosyn, f/u ID consult  • Discharge planning – anticipated discharge is Home

## 2025-04-15 NOTE — DISCHARGE NOTE NURSING/CASE MANAGEMENT/SOCIAL WORK - FINANCIAL ASSISTANCE
[facility] provides services at a reduced cost to those who are determined to be eligible through [facility]’s financial assistance program. Information regarding [facility]’s financial assistance program can be found by going to [email or web address] or by calling [phone].

## 2025-04-15 NOTE — CONSULT NOTE ADULT - SUBJECTIVE AND OBJECTIVE BOX
INFECTIOUS DISEASES AND INTERNAL MEDICINE at [place]  =======================================================  Adriano Veliz MD  Diplomates American Board of Internal Medicine and Infectious Diseases  Telephone [phone]  Fax            [phone]  =======================================================    JAYME DE [id number]      HPI:  JAYME DE JESUS  [id number]    SUBJECTIVE: Patient is a 69y Male s/p C5-C7 ACDF 4/2/25 presenting to the ED with complaints of increased drainage from incision and difficulty swallowing. Patient was seen last night with reported difficulty swallowing. THis am patient reports that swallowing has improved with decadron. Dressings this am was changed q2 hours x 3 times. each time dressing was saturated with serous draining. Patient denies acute sensory/motor. Refer to consult note for initial presentation and history. Orthopedics will admit to monitor draining  S/P  OR  4/14 for incision and drainage. Patient reports he does not take plavix but has taken the aspirin 325 QD that was prescribed post op.   AS ABOVE SP OR 4/2 NOW  WITH DRAINAGE  RETURNED TO OR 4/14  ASKED TO EVALUATE FROM ID STANDPOINT         OBJECTIVE:   Vital Signs Last 24 Hrs  T(C): 37.1 (13 Apr 2025 05:48), Max: 37.1 (13 Apr 2025 05:48)  T(F): 98.7 (13 Apr 2025 05:48), Max: 98.7 (13 Apr 2025 05:48)  HR: 95 (13 Apr 2025 11:18) (69 - 95)  BP: 147/81 (13 Apr 2025 11:18) (94/55 - 147/81)  BP(mean): --  RR: 20 (13 Apr 2025 11:18) (18 - 20)  SpO2: 97% (13 Apr 2025 11:18) (92% - 100%)    Parameters below as of 13 Apr 2025 07:10  Patient On (Oxygen Delivery Method): room air       c     PAST MEDICAL & SURGICAL HISTORY:  Myocardial infarction      PFO (patent foramen ovale)      Transient ischemic attack (TIA)      History of colitis      Disc disease with myelopathy, cervical      Radiculopathy, cervical      Obstructive sleep apnea on CPAP      HTN (hypertension)      Diabetes type 2      GERD (gastroesophageal reflux disease)      Alzheimer disease      Rectus diastasis      H/O cardiac catheterization      History of surgical closure of patent foramen ovale (PFO)      S/P lumbar laminectomy      History of cholecystectomy          ANTIBIOTICS  ceFAZolin  Injectable. 2000 milliGRAM(s) IV Push once  piperacillin/tazobactam IVPB.. 3.375 Gram(s) IV Intermittent every 8 hours  vancomycin  IVPB 1250 milliGRAM(s) IV Intermittent every 12 hours      Allergies    No Known Allergies    Intolerances        SOCIAL HISTORY:     FAMILY HX   FAMILY HISTORY:  FH: heart disease (Father)    Family history of CVA (Mother)        Vital Signs Last 24 Hrs  T(C): 36.7 (15 Apr 2025 08:44), Max: 36.7 (14 Apr 2025 22:25)  T(F): 98 (15 Apr 2025 08:44), Max: 98.1 (14 Apr 2025 22:25)  HR: 57 (15 Apr 2025 08:44) (57 - 76)  BP: 158/74 (15 Apr 2025 08:44) (145/84 - 173/81)  BP(mean): 109 (14 Apr 2025 22:00) (102 - 109)  RR: 18 (15 Apr 2025 08:44) (14 - 20)  SpO2: 93% (15 Apr 2025 08:44) (92% - 100%)    Parameters below as of 15 Apr 2025 08:44  Patient On (Oxygen Delivery Method): room air      Drug Dosing Weight  Height (cm): 177.8 (14 Apr 2025 16:15)  Weight (kg): 107.2 (14 Apr 2025 16:15)  BMI (kg/m2): 33.9 (14 Apr 2025 16:15)  BSA (m2): 2.24 (14 Apr 2025 16:15)      REVIEW OF SYSTEMS:    CONSTITUTIONAL:  As per HPI.    HEENT:  Eyes:  No diplopia or blurred vision. ENT :AS PER HPI .    CARDIOVASCULAR:  No pressure, squeezing, strangling, tightness, heaviness or aching about the chest, neck, axilla or epigastrium.    RESPIRATORY:  No cough, shortness of breath, PND or orthopnea.    GASTROINTESTINAL:  No nausea, vomiting or diarrhea.    GENITOURINARY:  No dysuria, frequency or urgency.    MUSCULOSKELETAL:  As per HPI.    SKIN:  No change in skin, hair or nails.    NEUROLOGIC:  No paresthesias, fasciculations, seizures or weakness.                  PHYSICAL EXAMINATION:    GENERAL: The patient is a _____in no apparent distress. ___     VITAL SIGNS: T(C): 36.7 (04-15-25 @ 08:44), Max: 36.7 (04-14-25 @ 22:25)  HR: 57 (04-15-25 @ 08:44) (57 - 76)  BP: 158/74 (04-15-25 @ 08:44) (145/84 - 173/81)  RR: 18 (04-15-25 @ 08:44) (14 - 20)  SpO2: 93% (04-15-25 @ 08:44) (92% - 100%)  Wt(kg): --    HEENT: Head is normocephalic and atraumatic.  ANICTERIC  NECK: Supple. No carotid bruits.  No lymphadenopathy or thyromegaly. operative dressing in place    LUNGS:COARSE BREATH SOUNDS    HEART: Regular rate and rhythm without murmur.    ABDOMEN: Soft, nontender, and nondistended.  Positive bowel sounds.  No hepatosplenomegaly was noted. NO REBOUND NO GUARDING    EXTREMITIES: NO EDEMA NO ERYTHEMA    NEUROLOGIC: NON FOCAL      SKIN: No ulceration or induration present. NO RASH        BLOOD CULTURES  Culture Results:   No growth at 24 hours (04-14 @ 05:00)       URINE CX          LABS:                        13.7   14.83 )-----------( 234      ( 14 Apr 2025 05:00 )             42.5     04-14    138  |  101  |  19.7  ----------------------------<  144[H]  4.2   |  22.0  |  0.90    Ca    8.8      14 Apr 2025 05:00    TPro  6.9  /  Alb  3.7  /  TBili  0.3[L]  /  DBili  x   /  AST  16  /  ALT  24  /  AlkPhos  58  04-14    PT/INR - ( 13 Apr 2025 12:29 )   PT: 13.2 sec;   INR: 1.14 ratio           Urinalysis Basic - ( 14 Apr 2025 05:00 )    Color: x / Appearance: x / SG: x / pH: x  Gluc: 144 mg/dL / Ketone: x  / Bili: x / Urobili: x   Blood: x / Protein: x / Nitrite: x   Leuk Esterase: x / RBC: x / WBC x   Sq Epi: x / Non Sq Epi: x / Bacteria: x        RADIOLOGY & ADDITIONAL STUDIES:      ASSESSMENT/PLAN     Patient is a 69y Male s/p C5-C7 ACDF 4/2/25 presenting to the ED with complaints of increased drainage from incision and difficulty swallowing. Patient was seen last night with reported difficulty swallowing. THis am patient reports that swallowing has improved with decadron. Dressings this am was changed q2 hours x 3 times. each time dressing was saturated with serous draining. Patient denies acute sensory/motor. Refer to consult note for initial presentation and history. Orthopedics will admit to monitor draining  S/P  OR  4/14 for incision and drainage. Patient reports he does not take plavix but has taken the aspirin 325 QD that was prescribed post op.   AS ABOVE SP OR 4/2 NOW  WITH DRAINAGE  RETURNED TO OR 4/14  BLOOD CX SO FAR NEGATIVE  WILL CHECK OPERATIVE CX STILL Pending  CURRENTLY ON  ZOSYN/VANC  WILL CONTINUE  FOR NOW  OVERALL NON TOXIC AMBUALTING  AROUND FLOOR                  ADITYA ALBERTO MD
70 y/o male with Hx significant for MI, s/p PCI x 11, TIA, PFO s/p PFO closure, HTN, HLD, DM2, colitis, alzheimer's, and MILLICENT on CPAP, he was previous diagnosed in 2023 with cervical myelopathy and lumbar disc disease at which time he underwent a lumbar laminectomy at [facility], He states he noted an improvement in his neck pain after surgery but in the past couple of months has noted a progressive worsening in pain, he was having neck pain, he came in here for elective anterior cervical discectomy and fusion C5-C6, C5-C7 with Dr. Spencer on 4/2/25 s/p surgery, he was discharged after surgery, comes back with draining wound on his neck, likely going to OR for wound exploration/ I&D, medicine consulted for Medical Management.       PAST MEDICAL & SURGICAL HISTORY:  Myocardial infarction      PFO (patent foramen ovale)      Transient ischemic attack (TIA)      History of colitis      Disc disease with myelopathy, cervical      Radiculopathy, cervical      Obstructive sleep apnea on CPAP      HTN (hypertension)      Diabetes type 2      GERD (gastroesophageal reflux disease)      Alzheimer disease      Rectus diastasis      H/O cardiac catheterization      History of surgical closure of patent foramen ovale (PFO)      S/P lumbar laminectomy      History of cholecystectomy    Home Medications:   · 	multivitamin: once a day  · 	gabapentin 300 mg oral tablet:  orally 2 times a day  · 	Systane preserved ophthalmic solution: 1 drop(s) in each eye 2 times a day  · 	losartan 50 mg oral tablet: 1 tab(s) orally once a day  · 	carvedilol 6.25 mg oral tablet: 1 tab(s) orally 2 times a day  · 	citalopram 10 mg oral tablet:  1 tab(s) orally once a day  · 	pantoprazole 20 mg oral delayed release tablet: 1 tab(s) orally once a day  · 	ezetimibe 10 mg oral tablet:, 1 tab(s) orally once a day  · 	rosuvastatin 40 mg oral tablet: 1 tab(s) orally once a day  · 	memantine 10 mg oral tablet: 1 tab(s) orally 2 times a day  · 	fesoterodine 8 mg oral tablet, extended release: 1 tab(s) orally once a day (at bedtime)  · 	Jardiance 25 mg oral tablet: 1 tab(s) orally once a day  · 	Gemtesa 75 mg oral tablet: 1 tab(s) orally once a day  · 	Mounjaro 7.5 mg/0.5 mL subcutaneous solution: 7.5 milligram(s) subcutaneously once a week sundays      Allergies    No Known Allergies    Intolerances        SOCIAL HISTORY:  Never a smoker  Social ETOH  Denies IVDA    FAMILY HISTORY:  FH: heart disease (Father)    Family history of CVA (Mother)    Vital Signs Last 24 Hrs  T(C): 37.1 (13 Apr 2025 05:48), Max: 37.1 (13 Apr 2025 05:48)  T(F): 98.7 (13 Apr 2025 05:48), Max: 98.7 (13 Apr 2025 05:48)  HR: 95 (13 Apr 2025 11:18) (69 - 95)  BP: 147/81 (13 Apr 2025 11:18) (94/55 - 147/81)  RR: 20 (13 Apr 2025 11:18) (18 - 20)  SpO2: 97% (13 Apr 2025 11:18) (92% - 100%)    Parameters below as of 13 Apr 2025 07:10  Patient On (Oxygen Delivery Method): room air        PHYSICAL EXAM:    GENERAL: Elderly male looking comfortable   HEENT: PERRL, +EOMI  NECK: Anterior aspect of the neck with dressings on  CHEST/LUNG: Clear to auscultate bilaterally; No wheezing  HEART: S1S2+, Regular rate and rhythm; No murmurs  ABDOMEN: Soft, Nontender, Nondistended; Bowel sounds present  EXTREMITIES:  1+ Peripheral Pulses, No edema  SKIN: No rashes or lesions  NEURO: AAOX3  PSYCH: normal mood          LABS:                        14.0   13.95 )-----------( 222      ( 13 Apr 2025 12:29 )             43.1     04-12    134[L]  |  97  |  20.2[H]  ----------------------------<  169[H]  3.9   |  23.0  |  1.29    Ca    8.7      12 Apr 2025 22:59    TPro  6.9  /  Alb  3.7  /  TBili  0.6  /  DBili  x   /  AST  13  /  ALT  25  /  AlkPhos  54  04-12    PT/INR - ( 13 Apr 2025 12:29 )   PT: 13.2 sec;   INR: 1.14 ratio            I&O's Summary      MEDICATIONS  (STANDING):  carvedilol 6.25 milliGRAM(s) Oral every 12 hours  chlorhexidine 2% Cloths 1 Application(s) Topical every 12 hours  citalopram 10 milliGRAM(s) Oral daily  dextrose 5%. 1000 milliLiter(s) (50 mL/Hr) IV Continuous <Continuous>  dextrose 5%. 1000 milliLiter(s) (100 mL/Hr) IV Continuous <Continuous>  dextrose 50% Injectable 25 Gram(s) IV Push once  dextrose 50% Injectable 12.5 Gram(s) IV Push once  dextrose 50% Injectable 25 Gram(s) IV Push once  ezetimibe 10 milliGRAM(s) Oral daily  gabapentin 300 milliGRAM(s) Oral two times a day  glucagon  Injectable 1 milliGRAM(s) IntraMuscular once  insulin lispro (ADMELOG) corrective regimen sliding scale   SubCutaneous three times a day before meals  losartan 50 milliGRAM(s) Oral daily  memantine 10 milliGRAM(s) Oral two times a day  mupirocin 2% Ointment 1 Application(s) Both Nostrils two times a day  oxybutynin 5 milliGRAM(s) Oral two times a day  pantoprazole    Tablet 20 milliGRAM(s) Oral before breakfast  povidone iodine 10% Nasal Swab 1 Application(s) Both Nostrils once  rosuvastatin 40 milliGRAM(s) Oral at bedtime  sodium chloride 0.9%. 1000 milliLiter(s) (125 mL/Hr) IV Continuous <Continuous>    MEDICATIONS  (PRN):  dextrose Oral Gel 15 Gram(s) Oral once PRN Blood Glucose LESS THAN 70 milliGRAM(s)/deciliter

## 2025-04-15 NOTE — DISCHARGE NOTE NURSING/CASE MANAGEMENT/SOCIAL WORK - PATIENT PORTAL LINK FT
You can access the FollowMyHealth Patient Portal offered by [facility] by registering at the following website: [email or web address]. By joining [employer]’s FollowMyHealth portal, you will also be able to view your health information using other applications (apps) compatible with our system.

## 2025-04-15 NOTE — PROGRESS NOTE ADULT - ASSESSMENT
70 y/o male with Hx significant for MI, s/p PCI x 11, TIA, PFO s/p PFO closure, HTN, HLD, DM2, colitis, alzheimer's, and MILLICENT on CPAP, he was previous diagnosed in 2023 with cervical myelopathy and lumbar disc disease at which time he underwent a lumbar laminectomy at [facility]. He stated he noted an improvement in his neck pain after surgery but in the past couple of months had noted a progressive worsening in pain, he was having neck pain. He came in here for elective anterior cervical discectomy and fusion C5-C6, C5-C7 with Dr. Spencer on 4/2/25 s/p surgery, he was discharged after surgery.  He came back with draining wound on his neck, likely going to OR for wound exploration/ I&D, medicine consulted for Medical Management.     Neck pain s/p prior anterior cervical discectomy and fusion C5-C6, C5-C7 4/2/25, now has wound infection:   s/p revision ACDF C5-7, durotomy repair POD # 1  -f/u blood cultures - negative to date   -f/u wound cultures   -continue Zosyn and Vanco    -ID consult   -opiate induced constipation regimen   -encouraging incentive spirometry   -c/w local wound care per ortho   -DVT prophylaxis and Pain meds as per Ortho team   -PT/OT   -gabapentin 300 mg 2 times a day  -Leukocytosis noted.    CAD: Continue ASA and statin.    Hx of HTN: will continue carvedilol 6.25 mg 2 times a day with holding parameters, resume Losartan 50 mg once a day on POD#2.    Hx of Anxiety: will continue with citalopram 10 mg once a day    GERD: will continue with Pantoprazole 20 mg once a day    HLD: will continue with Ezetimibe 10 mg once a day and Rosuvastatin 40 mg once a day    Hx of alzheimer's disease: will continue with memantine 10 mg 2 times a day    Hx of over active bladder, will continue with Fesoterodine 8 mg orally once a day (at bedtime) and Gemtesa 75 mg once a day    Hx of DM: FS monitoring and coverage insulin, will hold Jardiance 25 mg once a day, will resume after surgery, will resume Mounjaro upon discharge, post op if sugar levels are high might start low dose Lantus, currently stable.    MILLICENT: will continue with home CPAP    DVT prophylaxis: ASA as per Ortho.             68 y/o male with Hx significant for MI, s/p PCI x 11, TIA, PFO s/p PFO closure, HTN, HLD, DM2, colitis, alzheimer's, and MILLICENT on CPAP, he was previous diagnosed in 2023 with cervical myelopathy and lumbar disc disease at which time he underwent a lumbar laminectomy at [facility]. He stated he noted an improvement in his neck pain after surgery but in the past couple of months had noted a progressive worsening in pain, he was having neck pain. He came in here for elective anterior cervical discectomy and fusion C5-C6, C5-C7 with Dr. Spencer on 4/2/25 s/p surgery, he was discharged after surgery.  He came back with draining wound on his neck, likely going to OR for wound exploration/ I&D, medicine consulted for Medical Management.     Neck pain s/p prior anterior cervical discectomy and fusion C5-C6, C5-C7 4/2/25, now has wound infection:   s/p revision ACDF C5-7, durotomy repair POD # 1  -f/u blood cultures - negative to date   -f/u wound cultures   -continue Zosyn and Vanco    -ID consult pending   -opiate induced constipation regimen   -encouraging incentive spirometry   -c/w local wound care per ortho   -DVT prophylaxis and Pain meds as per Ortho team   -PT/OT   -gabapentin 300 mg 2 times a day  -Leukocytosis noted.    CAD: Continue ASA and statin.    Hx of HTN: will continue carvedilol 6.25 mg 2 times a day with holding parameters, resume Losartan 50 mg once a day on POD#2.    Hx of Anxiety: will continue with citalopram 10 mg once a day    GERD: will continue with Pantoprazole 20 mg once a day    HLD: will continue with Ezetimibe 10 mg once a day and Rosuvastatin 40 mg once a day    Hx of alzheimer's disease: will continue with memantine 10 mg 2 times a day    Hx of over active bladder, will continue with Fesoterodine 8 mg orally once a day (at bedtime) and Gemtesa 75 mg once a day    Hx of DM: FS monitoring and coverage insulin, will hold Jardiance 25 mg once a day, will resume after surgery, will resume Mounjaro upon discharge, post op if sugar levels are high might start low dose Lantus, currently stable.    MILLICENT: will continue with home CPAP    DVT prophylaxis: ASA as per Ortho.

## 2025-04-15 NOTE — DISCHARGE NOTE PROVIDER - NSDCFUADDINST_GEN_ALL_CORE_FT
Please make an appointment for follow up with your surgeon in 1-2 weeks. Call to schedule an appointment. Keep incision site clean and dry. No creams, lotions, or ointments to incision area. You are cleared to shower 3 days after surgery unless otherwise instructed. Place new dry dressing after shower.    Take pain medication as prescribed after surgery for pain control. Contact your surgeon's office if pain increases while taking prescribed pain medications or if related concerns develop. If you are taking narcotic pain medications, take a stool softener with it to prevent narcotic associated constipation. Additionally, increase water intake (drink at least 8 glasses daily) and add fiber to your diet by eating fruits, vegetables and foods that are rich in grains.     Do NOT take NSAIDs, including ibuprofen, Advil, Aleve, and Motrin for at least 3 months after your surgery as these medications can impact your healing.    NO heavy lifting, strenuous activity, twisting, bending, driving, or working until cleared by your surgeon  Contact your surgeon's office immediately for any of the following: fever, bleeding, new onset numbness/tingling/weakness, nausea and/or vomiting, urinary and/or fecal incontinence or retention. If an emergency occurs (chest pain, shortness of breath, new confusion, seizure, altered mental status, or other), call 911 and go to the emergency department for evaluation.

## 2025-04-15 NOTE — OCCUPATIONAL THERAPY INITIAL EVALUATION ADULT - ADAPTIVE EQUIPMENT NEEDED
----- Message from Amanda Flores sent at 12/8/2017  3:56 PM CST -----  Contact: [phone]  Kim Her (Relative/dnl)  Patient dnl kim is returning nurse's phone call, in regards to getting patient a rolling walker.  Please call patient dnl back at [phone].   
----- Message from Bernie Berger sent at 12/7/2017  9:59 AM CST -----  Contact: daughter in law,  Kim Her  Patient's daughter in law,  Kim Her called asking for advice about getting a order for a Rollator walker.  Please call patient's daughter in law at [phone]. Thanks!    
----- Message from Tash Rock sent at 12/11/2017  2:32 PM CST -----  Contact: Kim  Patient's daughter/granddaughter is calling to state at [facility] now and they want to sent to [facility] due to brain bleed and brain has shifted four inches. Thanks!  
AKTELYNNVM for pt family to call back.   Ja script copied and faxed to [facility] with ins info.    
Daughter in law called and reports pt has had a midline shift from an brain bleed family reports that she is being sent to [facility] now  
Done  May call this in as they usually send more extensive paperwork  
Just Fyi  
Please advise   
Pls check with family  I am so sorry to hear this happened  How is she doing today?  
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  Pt in [facility] in [place], Neurology fourth floor, She said if Dr Rivas needed to call to speak with     
Spoke with daughter  
no

## 2025-04-15 NOTE — DISCHARGE NOTE PROVIDER - NSDCMRMEDTOKEN_GEN_ALL_CORE_FT
acetaminophen 325 mg oral tablet: 3 tab(s) orally every 8 hours  aspirin 325 mg oral delayed release tablet: 1 tab(s) orally once a day x 28 days  carvedilol 6.25 mg oral tablet: 1 tab(s) orally 2 times a day  citalopram 10 mg oral tablet: 1 tab(s) orally once a day  ezetimibe 10 mg oral tablet: 1 tab(s) orally once a day  fesoterodine 8 mg oral tablet, extended release: 1 tab(s) orally once a day (at bedtime)  gabapentin 300 mg oral tablet: orally 2 times a day  Gemtesa 75 mg oral tablet: 1 tab(s) orally once a day  Jardiance 25 mg oral tablet: 1 tab(s) orally once a day  losartan 50 mg oral tablet: 1 tab(s) orally once a day  memantine 10 mg oral tablet: 1 tab(s) orally 2 times a day  methocarbamol 750 mg oral tablet: 1 tab(s) orally every 8 hours as needed for  muscle spasm MDD: 3  Mounjaro 7.5 mg/0.5 mL subcutaneous solution: 7.5 milligram(s) subcutaneously once a week sundays  multivitamin: once a day  Narcan 4 mg/0.1 mL nasal spray: 4 milligram(s) intranasally once as needed for narcotic overdose Follow instructions in kit  oxyCODONE 5 mg oral tablet: 1 tab(s) orally every 6 hours as needed for  severe pain MDD: 4  pantoprazole 20 mg oral delayed release tablet: 1 tab(s) orally once a day  rosuvastatin 40 mg oral tablet: 1 tab(s) orally once a day  Senna S 50 mg-8.6 mg oral tablet: 2 tab(s) orally once a day (at bedtime)  Systane preserved ophthalmic solution: 1 drop(s) in each eye 2 times a day  Toviaz 8 mg oral tablet, extended release: 1 tab(s) orally once a day   acetaminophen 325 mg oral tablet: 3 tab(s) orally every 8 hours  aspirin 81 mg oral delayed release tablet: 1 tab(s) orally once a day x 28 days for DVT ppx  Bactrim  mg-160 mg oral tablet: 1 tab(s) orally every 12 hours  carvedilol 6.25 mg oral tablet: 1 tab(s) orally 2 times a day  citalopram 10 mg oral tablet: 1 tab(s) orally once a day  ezetimibe 10 mg oral tablet: 1 tab(s) orally once a day  gabapentin 300 mg oral tablet: orally 2 times a day  Gemtesa 75 mg oral tablet: 1 tab(s) orally once a day  Jardiance 25 mg oral tablet: 1 tab(s) orally once a day  losartan 50 mg oral tablet: 1 tab(s) orally once a day  memantine 10 mg oral tablet: 1 tab(s) orally 2 times a day  Mounjaro 7.5 mg/0.5 mL subcutaneous solution: 7.5 milligram(s) subcutaneously once a week sundays  naloxone 4 mg/0.1 mL nasal spray: 1 spray(s) intranasally once a day as needed for  narcotic overdose  oxyCODONE 5 mg oral tablet: 1 tab(s) orally every 6 hours as needed for  pain MDD: 4  pantoprazole 40 mg oral delayed release tablet: 1 tab(s) orally once a day (before a meal)  rosuvastatin 40 mg oral tablet: 1 tab(s) orally once a day  Senna S 50 mg-8.6 mg oral tablet: 2 tab(s) orally once a day (at bedtime)  Systane preserved ophthalmic solution: 1 drop(s) in each eye 2 times a day  Toviaz 8 mg oral tablet, extended release: 1 tab(s) orally once a day

## 2025-04-15 NOTE — DISCHARGE NOTE PROVIDER - NSDCCPTREATMENT_GEN_ALL_CORE_FT
PRINCIPAL PROCEDURE  Procedure: Revision of anterior cervical discectomy with fusion (ACDF)  Findings and Treatment:

## 2025-04-15 NOTE — PROGRESS NOTE ADULT - NS ATTEND AMEND GEN_ALL_CORE FT
Orthopaedic Spine/Trauma Addendum:    I have personally reviewed the patients chart, imaging and lab results. I have reviewed the physician assistant note and agree with the history, exam, and plan of care, except as noted.    Doing well, no issue with swallowing or breathing  No radicular symptoms  No drainage from wound  NO hA's, nausea    No signs of infection intraop, clear CSF leak was cause of drainage  Stable for DC home  outpatient F/u  C collar at all times except when showering  UPright 90 deg for 2 more days then can lay flat  f/U in office in 1 week for wound check     Alessio Bass DO  Orthopaedic Spine/Trauma Surgeon  [facility] [place]

## 2025-04-15 NOTE — DISCHARGE NOTE PROVIDER - CARE PROVIDER_API CALL
Alessio Bass  Spine Surgery  [address] [place] [id number]  Phone: [phone]  Fax: [phone]  Follow Up Time:

## 2025-04-15 NOTE — PROGRESS NOTE ADULT - SUBJECTIVE AND OBJECTIVE BOX
CC: seroma of anterior cervical area (14 Apr 2025 20:44)    HPI:  68 y/o male with Hx significant for MI, s/p PCI x 11, TIA, PFO s/p PFO closure, HTN, HLD, DM2, colitis, alzheimer's, and MILLICENT on CPAP, he was previous diagnosed in 2023 with cervical myelopathy and lumbar disc disease at which time he underwent a lumbar laminectomy at [facility]. He stated he noted an improvement in his neck pain after surgery but in the past couple of months had noted a progressive worsening in pain, stated he was having neck pain, he came in here for elective anterior cervical discectomy and fusion C5-C6, C5-C7 with Dr. Spencer on 4/2/25 s/p surgery. He was discharged after surgery, now came back with draining wound on his neck, likely going to OR for wound exploration/ I&D, medicine consulted for Medical Management.     INTERVAL HPI/OVERNIGHT EVENTS:  Patient seen and examined sitting up in the bed.  Patient reports pain controlled.  Patient denies any headache, dizziness, SOB, CP, abdominal pain, nausea, vomiting, dysuria.  Other ROS reviewed and are negative.    Vital Signs Last 24 Hrs  T(C): 36.7 (15 Apr 2025 08:44), Max: 36.7 (14 Apr 2025 22:25)  T(F): 98 (15 Apr 2025 08:44), Max: 98.1 (14 Apr 2025 22:25)  HR: 57 (15 Apr 2025 08:44) (57 - 76)  BP: 158/74 (15 Apr 2025 08:44) (145/84 - 173/81)  BP(mean): 109 (14 Apr 2025 22:00) (102 - 109)  RR: 18 (15 Apr 2025 08:44) (14 - 20)  SpO2: 93% (15 Apr 2025 08:44) (92% - 100%)    Parameters below as of 15 Apr 2025 08:44  Patient On (Oxygen Delivery Method): room air      I&O's Detail    14 Apr 2025 07:01  -  15 Apr 2025 07:00  --------------------------------------------------------  IN:    IV PiggyBack: 50 mL    Lactated Ringers: 825 mL    Oral Fluid: 230 mL  Total IN: 1105 mL    OUT:    Voided (mL): 1600 mL  Total OUT: 1600 mL    Total NET: -495 mL      PHYSICAL EXAM:  GENERAL: NAD  HEAD:  Atraumatic, Normocephalic  NECK: Supple, No JVD, Normal thyroid, anterior neck with clean dressing  NERVOUS SYSTEM:  Alert & Oriented X3, Good concentration; Motor Strength 5/5 B/L upper and lower extremities  CHEST/LUNG: Clear to auscultation bilaterally  HEART: Regular rate and rhythm; No murmurs, rubs, or gallops  ABDOMEN: Soft, Nontender, Nondistended; Bowel sounds present  EXTREMITIES:  2+ Peripheral Pulses  SKIN: No rashes or lesions                                13.7   14.83 )-----------( 234      ( 14 Apr 2025 05:00 )             42.5     14 Apr 2025 05:00    138    |  101    |  19.7   ----------------------------<  144    4.2     |  22.0   |  0.90     Ca    8.8        14 Apr 2025 05:00    TPro  6.9    /  Alb  3.7    /  TBili  0.3    /  DBili  x      /  AST  16     /  ALT  24     /  AlkPhos  58     14 Apr 2025 05:00    PT/INR - ( 13 Apr 2025 12:29 )   PT: 13.2 sec;   INR: 1.14 ratio           CAPILLARY BLOOD GLUCOSE  POCT Blood Glucose.: 144 mg/dL (15 Apr 2025 07:42)  POCT Blood Glucose.: 129 mg/dL (14 Apr 2025 20:01)  POCT Blood Glucose.: 110 mg/dL (14 Apr 2025 16:24)  POCT Blood Glucose.: 115 mg/dL (14 Apr 2025 11:44)    LIVER FUNCTIONS - ( 14 Apr 2025 05:00 )  Alb: 3.7 g/dL / Pro: 6.9 g/dL / ALK PHOS: 58 U/L / ALT: 24 U/L / AST: 16 U/L / GGT: x           Urinalysis Basic - ( 14 Apr 2025 05:00 )    Color: x / Appearance: x / SG: x / pH: x  Gluc: 144 mg/dL / Ketone: x  / Bili: x / Urobili: x   Blood: x / Protein: x / Nitrite: x   Leuk Esterase: x / RBC: x / WBC x   Sq Epi: x / Non Sq Epi: x / Bacteria: x        MEDICATIONS  (STANDING):  acetaminophen     Tablet .. 975 milliGRAM(s) Oral every 8 hours  aspirin enteric coated 81 milliGRAM(s) Oral daily  carvedilol 6.25 milliGRAM(s) Oral every 12 hours  ceFAZolin  Injectable. 2000 milliGRAM(s) IV Push once  celecoxib 200 milliGRAM(s) Oral every 12 hours  citalopram 10 milliGRAM(s) Oral daily  dextrose 5%. 1000 milliLiter(s) (100 mL/Hr) IV Continuous <Continuous>  dextrose 5%. 1000 milliLiter(s) (50 mL/Hr) IV Continuous <Continuous>  dextrose 50% Injectable 25 Gram(s) IV Push once  dextrose 50% Injectable 12.5 Gram(s) IV Push once  dextrose 50% Injectable 25 Gram(s) IV Push once  ezetimibe 10 milliGRAM(s) Oral daily  gabapentin 300 milliGRAM(s) Oral two times a day  glucagon  Injectable 1 milliGRAM(s) IntraMuscular once  insulin lispro (ADMELOG) corrective regimen sliding scale   SubCutaneous three times a day before meals  insulin lispro (ADMELOG) corrective regimen sliding scale   SubCutaneous at bedtime  lactated ringers. 1000 milliLiter(s) (75 mL/Hr) IV Continuous <Continuous>  memantine 10 milliGRAM(s) Oral two times a day  methocarbamol 500 milliGRAM(s) Oral every 8 hours  oxybutynin 5 milliGRAM(s) Oral two times a day  pantoprazole    Tablet 40 milliGRAM(s) Oral before breakfast  piperacillin/tazobactam IVPB.. 3.375 Gram(s) IV Intermittent every 8 hours  rosuvastatin 40 milliGRAM(s) Oral at bedtime  senna 2 Tablet(s) Oral at bedtime  tamsulosin 0.4 milliGRAM(s) Oral at bedtime  vancomycin  IVPB 1500 milliGRAM(s) IV Intermittent every 12 hours    MEDICATIONS  (PRN):  aluminum hydroxide/magnesium hydroxide/simethicone Suspension 30 milliLiter(s) Oral every 12 hours PRN Indigestion  benzocaine/menthol Lozenge 1 Lozenge Oral every 2 hours PRN Sore Throat  dextrose Oral Gel 15 Gram(s) Oral once PRN Blood Glucose LESS THAN 70 milliGRAM(s)/deciliter  magnesium hydroxide Suspension 30 milliLiter(s) Oral every 12 hours PRN Constipation  melatonin 3 milliGRAM(s) Oral at bedtime PRN Insomnia  ondansetron Injectable 4 milliGRAM(s) IV Push every 6 hours PRN Nausea and/or Vomiting  ondansetron Injectable 4 milliGRAM(s) IV Push every 6 hours PRN Nausea and/or Vomiting  oxyCODONE    IR 10 milliGRAM(s) Oral every 3 hours PRN Severe Pain (7 - 10)  oxyCODONE    IR 5 milliGRAM(s) Oral every 3 hours PRN Moderate Pain (4 - 6)

## 2025-04-15 NOTE — PHYSICAL THERAPY INITIAL EVALUATION ADULT - ADDITIONAL COMMENTS
Pt lives with spouse in private home, 3 TERENCE with two rails, no steps inside. Pt was independent PTA, owns Rolling Walker, [employer].

## 2025-04-17 ENCOUNTER — INPATIENT (INPATIENT)
Facility: HOSPITAL | Age: 70
LOS: 3 days | Discharge: HOME | DRG: 863 | End: 2025-04-21
Attending: STUDENT IN AN ORGANIZED HEALTH CARE EDUCATION/TRAINING PROGRAM | Admitting: STUDENT IN AN ORGANIZED HEALTH CARE EDUCATION/TRAINING PROGRAM
Payer: MEDICARE

## 2025-04-17 VITALS
DIASTOLIC BLOOD PRESSURE: 76 MMHG | WEIGHT: 233.69 LBS | HEART RATE: 95 BPM | OXYGEN SATURATION: 98 % | TEMPERATURE: 98 F | SYSTOLIC BLOOD PRESSURE: 134 MMHG | RESPIRATION RATE: 20 BRPM | HEIGHT: 70 IN

## 2025-04-17 DIAGNOSIS — Z87.74 PERSONAL HISTORY OF (CORRECTED) CONGENITAL MALFORMATIONS OF HEART AND CIRCULATORY SYSTEM: Chronic | ICD-10-CM

## 2025-04-17 DIAGNOSIS — Z98.890 OTHER SPECIFIED POSTPROCEDURAL STATES: Chronic | ICD-10-CM

## 2025-04-17 DIAGNOSIS — T81.9XXA UNSPECIFIED COMPLICATION OF PROCEDURE, INITIAL ENCOUNTER: ICD-10-CM

## 2025-04-17 LAB
-  AMOXICILLIN/CLAVULANIC ACID: SIGNIFICANT CHANGE UP
-  AMOXICILLIN/CLAVULANIC ACID: SIGNIFICANT CHANGE UP
-  AMPICILLIN/SULBACTAM: SIGNIFICANT CHANGE UP
-  AMPICILLIN/SULBACTAM: SIGNIFICANT CHANGE UP
-  AMPICILLIN: SIGNIFICANT CHANGE UP
-  AMPICILLIN: SIGNIFICANT CHANGE UP
-  AZTREONAM: SIGNIFICANT CHANGE UP
-  AZTREONAM: SIGNIFICANT CHANGE UP
-  CEFAZOLIN: SIGNIFICANT CHANGE UP
-  CEFAZOLIN: SIGNIFICANT CHANGE UP
-  CEFEPIME: SIGNIFICANT CHANGE UP
-  CEFEPIME: SIGNIFICANT CHANGE UP
-  CEFOXITIN: SIGNIFICANT CHANGE UP
-  CEFOXITIN: SIGNIFICANT CHANGE UP
-  CEFTRIAXONE: SIGNIFICANT CHANGE UP
-  CEFTRIAXONE: SIGNIFICANT CHANGE UP
-  CIPROFLOXACIN: SIGNIFICANT CHANGE UP
-  CIPROFLOXACIN: SIGNIFICANT CHANGE UP
-  ERTAPENEM: SIGNIFICANT CHANGE UP
-  ERTAPENEM: SIGNIFICANT CHANGE UP
-  GENTAMICIN: SIGNIFICANT CHANGE UP
-  GENTAMICIN: SIGNIFICANT CHANGE UP
-  IMIPENEM: SIGNIFICANT CHANGE UP
-  IMIPENEM: SIGNIFICANT CHANGE UP
-  LEVOFLOXACIN: SIGNIFICANT CHANGE UP
-  LEVOFLOXACIN: SIGNIFICANT CHANGE UP
-  MEROPENEM: SIGNIFICANT CHANGE UP
-  MEROPENEM: SIGNIFICANT CHANGE UP
-  PIPERACILLIN/TAZOBACTAM: SIGNIFICANT CHANGE UP
-  PIPERACILLIN/TAZOBACTAM: SIGNIFICANT CHANGE UP
-  TOBRAMYCIN: SIGNIFICANT CHANGE UP
-  TOBRAMYCIN: SIGNIFICANT CHANGE UP
-  TRIMETHOPRIM/SULFAMETHOXAZOLE: SIGNIFICANT CHANGE UP
-  TRIMETHOPRIM/SULFAMETHOXAZOLE: SIGNIFICANT CHANGE UP
ALBUMIN SERPL ELPH-MCNC: 3.8 G/DL — SIGNIFICANT CHANGE UP (ref 3.3–5.2)
ALP SERPL-CCNC: 66 U/L — SIGNIFICANT CHANGE UP (ref 40–120)
ALT FLD-CCNC: 22 U/L — SIGNIFICANT CHANGE UP
ANION GAP SERPL CALC-SCNC: 12 MMOL/L — SIGNIFICANT CHANGE UP (ref 5–17)
APTT BLD: 32.4 SEC — SIGNIFICANT CHANGE UP (ref 24.5–35.6)
AST SERPL-CCNC: 19 U/L — SIGNIFICANT CHANGE UP
BILIRUB SERPL-MCNC: 0.2 MG/DL — LOW (ref 0.4–2)
BUN SERPL-MCNC: 13.5 MG/DL — SIGNIFICANT CHANGE UP (ref 8–20)
CALCIUM SERPL-MCNC: 9.6 MG/DL — SIGNIFICANT CHANGE UP (ref 8.4–10.5)
CHLORIDE SERPL-SCNC: 100 MMOL/L — SIGNIFICANT CHANGE UP (ref 96–108)
CO2 SERPL-SCNC: 26 MMOL/L — SIGNIFICANT CHANGE UP (ref 22–29)
CREAT SERPL-MCNC: 0.9 MG/DL — SIGNIFICANT CHANGE UP (ref 0.5–1.3)
CRP SERPL-MCNC: 13 MG/L — HIGH
EGFR: 92 ML/MIN/1.73M2 — SIGNIFICANT CHANGE UP
EGFR: 92 ML/MIN/1.73M2 — SIGNIFICANT CHANGE UP
ERYTHROCYTE [SEDIMENTATION RATE] IN BLOOD: 16 MM/HR — HIGH (ref 0–15)
GLUCOSE SERPL-MCNC: 129 MG/DL — HIGH (ref 70–99)
HCT VFR BLD CALC: 41.8 % — SIGNIFICANT CHANGE UP (ref 39–50)
HGB BLD-MCNC: 13.5 G/DL — SIGNIFICANT CHANGE UP (ref 13–17)
INR BLD: 1.03 RATIO — SIGNIFICANT CHANGE UP (ref 0.85–1.16)
MCHC RBC-ENTMCNC: 28 PG — SIGNIFICANT CHANGE UP (ref 27–34)
MCHC RBC-ENTMCNC: 32.3 G/DL — SIGNIFICANT CHANGE UP (ref 32–36)
MCV RBC AUTO: 86.5 FL — SIGNIFICANT CHANGE UP (ref 80–100)
METHOD TYPE: SIGNIFICANT CHANGE UP
METHOD TYPE: SIGNIFICANT CHANGE UP
NRBC # BLD AUTO: 0 K/UL — SIGNIFICANT CHANGE UP (ref 0–0)
NRBC # FLD: 0 K/UL — SIGNIFICANT CHANGE UP (ref 0–0)
NRBC BLD AUTO-RTO: 0 /100 WBCS — SIGNIFICANT CHANGE UP (ref 0–0)
PLATELET # BLD AUTO: 225 K/UL — SIGNIFICANT CHANGE UP (ref 150–400)
PMV BLD: 8.6 FL — SIGNIFICANT CHANGE UP (ref 7–13)
POTASSIUM SERPL-MCNC: 4.2 MMOL/L — SIGNIFICANT CHANGE UP (ref 3.5–5.3)
POTASSIUM SERPL-SCNC: 4.2 MMOL/L — SIGNIFICANT CHANGE UP (ref 3.5–5.3)
PROT SERPL-MCNC: 6.6 G/DL — SIGNIFICANT CHANGE UP (ref 6.6–8.7)
PROTHROM AB SERPL-ACNC: 11.9 SEC — SIGNIFICANT CHANGE UP (ref 9.9–13.4)
RBC # BLD: 4.83 M/UL — SIGNIFICANT CHANGE UP (ref 4.2–5.8)
RBC # FLD: 14.3 % — SIGNIFICANT CHANGE UP (ref 10.3–14.5)
SODIUM SERPL-SCNC: 138 MMOL/L — SIGNIFICANT CHANGE UP (ref 135–145)
WBC # BLD: 8.92 K/UL — SIGNIFICANT CHANGE UP (ref 3.8–10.5)
WBC # FLD AUTO: 8.92 K/UL — SIGNIFICANT CHANGE UP (ref 3.8–10.5)

## 2025-04-17 PROCEDURE — 99285 EMERGENCY DEPT VISIT HI MDM: CPT | Mod: GC

## 2025-04-17 PROCEDURE — G0545: CPT

## 2025-04-17 PROCEDURE — 99223 1ST HOSP IP/OBS HIGH 75: CPT

## 2025-04-17 RX ORDER — CARVEDILOL 3.12 MG/1
6.25 TABLET, FILM COATED ORAL EVERY 12 HOURS
Refills: 0 | Status: DISCONTINUED | OUTPATIENT
Start: 2025-04-17 | End: 2025-04-21

## 2025-04-17 RX ORDER — MEROPENEM 1 G/30ML
1000 INJECTION INTRAVENOUS EVERY 8 HOURS
Refills: 0 | Status: DISCONTINUED | OUTPATIENT
Start: 2025-04-17 | End: 2025-04-21

## 2025-04-17 RX ORDER — HYPROMELLOSE 0.4 %
1 DROPS OPHTHALMIC (EYE) DAILY
Refills: 0 | Status: DISCONTINUED | OUTPATIENT
Start: 2025-04-17 | End: 2025-04-21

## 2025-04-17 RX ORDER — MEROPENEM 1 G/30ML
1000 INJECTION INTRAVENOUS EVERY 8 HOURS
Refills: 0 | Status: DISCONTINUED | OUTPATIENT
Start: 2025-04-17 | End: 2025-04-17

## 2025-04-17 RX ORDER — OXYBUTYNIN CHLORIDE 5 MG/1
10 TABLET, FILM COATED, EXTENDED RELEASE ORAL
Refills: 0 | Status: DISCONTINUED | OUTPATIENT
Start: 2025-04-17 | End: 2025-04-21

## 2025-04-17 RX ORDER — GABAPENTIN 400 MG/1
300 CAPSULE ORAL
Refills: 0 | Status: DISCONTINUED | OUTPATIENT
Start: 2025-04-17 | End: 2025-04-21

## 2025-04-17 RX ORDER — MEMANTINE HYDROCHLORIDE 21 MG/1
10 CAPSULE, EXTENDED RELEASE ORAL
Refills: 0 | Status: DISCONTINUED | OUTPATIENT
Start: 2025-04-17 | End: 2025-04-21

## 2025-04-17 RX ORDER — ERTAPENEM SODIUM 1 G/1
1 INJECTION, POWDER, LYOPHILIZED, FOR SOLUTION INTRAMUSCULAR; INTRAVENOUS
Qty: 1 | Refills: 0
Start: 2025-04-17

## 2025-04-17 RX ORDER — ACETAMINOPHEN 500 MG/5ML
975 LIQUID (ML) ORAL EVERY 8 HOURS
Refills: 0 | Status: DISCONTINUED | OUTPATIENT
Start: 2025-04-17 | End: 2025-04-21

## 2025-04-17 RX ORDER — TAMSULOSIN HYDROCHLORIDE 0.4 MG/1
0.4 CAPSULE ORAL AT BEDTIME
Refills: 0 | Status: DISCONTINUED | OUTPATIENT
Start: 2025-04-17 | End: 2025-04-21

## 2025-04-17 RX ORDER — ASPIRIN 325 MG
81 TABLET ORAL DAILY
Refills: 0 | Status: DISCONTINUED | OUTPATIENT
Start: 2025-04-17 | End: 2025-04-21

## 2025-04-17 RX ORDER — ROSUVASTATIN CALCIUM 20 MG/1
40 TABLET, FILM COATED ORAL AT BEDTIME
Refills: 0 | Status: DISCONTINUED | OUTPATIENT
Start: 2025-04-17 | End: 2025-04-21

## 2025-04-17 RX ORDER — SODIUM CHLORIDE 9 G/1000ML
1000 INJECTION, SOLUTION INTRAVENOUS
Refills: 0 | Status: DISCONTINUED | OUTPATIENT
Start: 2025-04-17 | End: 2025-04-21

## 2025-04-17 RX ORDER — LOSARTAN POTASSIUM 100 MG/1
50 TABLET, FILM COATED ORAL DAILY
Refills: 0 | Status: DISCONTINUED | OUTPATIENT
Start: 2025-04-17 | End: 2025-04-21

## 2025-04-17 RX ORDER — CITALOPRAM 20 MG/1
10 TABLET ORAL DAILY
Refills: 0 | Status: DISCONTINUED | OUTPATIENT
Start: 2025-04-17 | End: 2025-04-21

## 2025-04-17 RX ORDER — EZETIMIBE 10 MG/1
10 TABLET ORAL DAILY
Refills: 0 | Status: DISCONTINUED | OUTPATIENT
Start: 2025-04-17 | End: 2025-04-21

## 2025-04-17 RX ADMIN — CARVEDILOL 6.25 MILLIGRAM(S): 3.12 TABLET, FILM COATED ORAL at 21:40

## 2025-04-17 RX ADMIN — Medication 975 MILLIGRAM(S): at 22:41

## 2025-04-17 RX ADMIN — GABAPENTIN 300 MILLIGRAM(S): 400 CAPSULE ORAL at 21:39

## 2025-04-17 RX ADMIN — MEMANTINE HYDROCHLORIDE 10 MILLIGRAM(S): 21 CAPSULE, EXTENDED RELEASE ORAL at 17:30

## 2025-04-17 RX ADMIN — SODIUM CHLORIDE 75 MILLILITER(S): 9 INJECTION, SOLUTION INTRAVENOUS at 17:32

## 2025-04-17 RX ADMIN — Medication 975 MILLIGRAM(S): at 21:41

## 2025-04-17 RX ADMIN — OXYBUTYNIN CHLORIDE 10 MILLIGRAM(S): 5 TABLET, FILM COATED, EXTENDED RELEASE ORAL at 21:39

## 2025-04-17 RX ADMIN — MEROPENEM 1000 MILLIGRAM(S): 1 INJECTION INTRAVENOUS at 17:30

## 2025-04-17 RX ADMIN — CITALOPRAM 10 MILLIGRAM(S): 20 TABLET ORAL at 21:40

## 2025-04-17 RX ADMIN — ROSUVASTATIN CALCIUM 40 MILLIGRAM(S): 20 TABLET, FILM COATED ORAL at 21:39

## 2025-04-17 RX ADMIN — TAMSULOSIN HYDROCHLORIDE 0.4 MILLIGRAM(S): 0.4 CAPSULE ORAL at 22:14

## 2025-04-17 RX ADMIN — MEROPENEM 1000 MILLIGRAM(S): 1 INJECTION INTRAVENOUS at 21:37

## 2025-04-17 NOTE — ED ADULT TRIAGE NOTE - CHIEF COMPLAINT QUOTE
Patient presents to ED for medical evaluation, patient advised to come to ED due to possible infection on surgical site, patient had cervical SX on 4/13, denies chills/fever.

## 2025-04-17 NOTE — ED ADULT NURSE NOTE - NS ED NURSE DISCH DISPOSITION
-Decrease protonix to 40mg twice per day.  Higher dose is unlikely to improve your symptoms     -Complete EGD with EndoFlip  EGD is an endoscopic procedure that allows your doctor to examine your esophagus, stomach and duodenum (part of your small intestine). EGD is an outpatient procedure, meaning you can go home that same day. It takes approximately 30 to 60 minutes to perform.  EndoFLIP is a technology that simultaneously measures the area across the inside of a gastrointestinal organ (for example, the esophagus) and the pressure inside that organ. The ratio of the two measurements is called distensibility (stiffness).  -Complete colonoscopy   A colonoscopy  is an exam used to detect changes or abnormalities in the large intestine (colon) and rectum. During a colonoscopy, a long, flexible tube (colonoscope) is inserted into the rectum. A tiny video camera at the tip of the tube allows the doctor to view the inside of the entire colon.    -Drink high protein shakes three to four times per day     Cannabinoid hyperemesis syndrome (CHS) and Cyclic vomiting Syndrome   -Stop using marijuana products   -Try sumatriptan to help prevent progression of cyclic vomiting episode. This is a treatment for migraines that sometimes helps with vomiting.    -Try sumatriptan 25mg during prodrome (when you feel that cyclic vomiting episode is about to start) You can take another 25mg 2 hours later if the symptoms do not get better.  Do not take more than 50 mg per day the first time.   -This medication should be used during prodrome only (when you start to feel that you will develop an episode of cyclic vomiting syndrome). You should see a response to this medicine within hours.  The medication may preven the episode from progressing to severe nasue and vomiting or it may make the symptoms milder).  This medication should not be uses every day.    -Increase Elavil to 25mg with your psychologist     Rumination Syndrome   You  "manometry study shows rumination syndrome.  I am including some inforamation about this disorder.     We treat this condition with belly breathing and most patients are able to control it with this breathing technique.     Please practice diaphragmatic breathing three times per day.  Follow the instructions in this video:   [email or web address]/[email or web address]    It is usually helpful to work with a counselor to become very good at this breathing technique.  If you already have a counselor, please discuss this with him or her at your next appointment.  If you do not have one, I will be happy to refer you to one or you can see someone recommended by your primary care provider.   Your counselor can review recently published work by Sanam Sargent, PhD and Hina Jurado MD about behavioral therapy of gastrointestinal disorders.  I highly recommend a recent book "Psychogastroenterology for Adults" by  Dr. Sanam Sargent.           I will set you up to see our GI dietitian in 2 and 6 weeks to work on this technique as well.  Please bring food to your appointment.    Rumination Syndrome  Rumination syndrome is a condition in which people repeatedly and unintentionally bring up (regurgitate) undigested or partially digested food from the stomach.  Sometimes they may rechew it, and then either reswallow the food or spit it out.     The food usually hasn't been digested, so people with rumination syndrome often report that the food tastes normal, not acidic like vomit. Rumination typically occurs every day, and at every meal, usually within 30 minutes of eating.    The precise cause of rumination syndrome is unknown, but it's clear that rumination is a subconscious behavior, not a conscious decision. Rumination syndrome is frequently confused with bulimia nervosa, gastroesophageal reflux disease (GERD) and gastroparesis. Some people have rumination syndrome and constipation caused by a rectal evacuation " disorder.    The condition has long been known to occur in infants and people with developmental disabilities, which may be related to an unvoiced desire to reject food. But it can also occur in other children, adolescents and adults. It's not clear how many people have this disorder.    Treatment depends on the exclusion of other disorders, as well as on the person's age and cognitive ability.     Behavior therapy  Specialists typically use habit reversal behavior therapy to treat people without developmental disabilities who have rumination syndrome. People learn to recognize when rumination occurs, and to breathe in and out with the abdominal muscles (diaphragmatic breathing) during those times. Diaphragmatic breathing prevents abdominal contractions and regurgitation.  For people who have mental or developmental disabilities, such behavioral treatment may not be possible. Treatment may involve mild aversive training -- associating rumination with negative consequences -- or other behavioral techniques.    Medication  If frequent rumination is damaging the esophagus, proton pump inhibitors may be prescribed. These medications can protect the lining of the esophagus until behavior therapy reduces the frequency and severity of regurgitation.  Some people with rumination syndrome may benefit from treatment with medication that helps relax the stomach in the period after eating.  Untreated, rumination syndrome can damage the tube between your mouth and stomach (esophagus) and cause unhealthy weight loss.    REFERENCES:  1. Justine ERICKSON, et al. Current diagnosis and management of the rumination syndrome. Journal of Clinical Gastroenterology. 2014;48:478.  2. Feeding and eating disorders. In: Diagnostic and Statistical Manual of Mental Disorders DSM-5. 5th ed. [place], [place].: American Psychiatric Association; 2013. http://www.psychiatryonline.org. Accessed July 27, 2015.  3. Nelly PETIT, et al. Rumination syndrome:  Clinical features rather than manometric diagnosis. Gastroenterology. 1995;108:1024.  4. Mousa HM, et al. Adolescent rumination syndrome. Current Gastroenterology Report. 2014;16:398.  5. Baird NJ. Rumination syndrome. Gastroenterology & Hepatology. 2011;7:117.  6. Jake P, et al. Novel association of rectal evacuation disorder and rumination syndrome: Diagnosis, co morbidities and treatment. United European Gastroenterology Journal. 2014;2:38.  7. Melvin GUPTA (expert opinion). [facility]. Aug. 3, 2015.  8. Jose HJ, et al. Rumination syndrome in children and adolescents: Diagnosis, treatment and prognosis. Pediatrics 2003;111:158.  9. Kong FRAIRE. [employer]scripts EPSi. [facility]. July 30, 2015.  10. Pati EARL, et al. Rumination syndrome: A review of current concepts and treatments. American Journal of Medical Science. 2014;348:324.  11. Ally PAGAN, et al., eds. Developmental disorders of attachment, feeding, elimination, & sleeping. In: Current Diagnosis & Treatment: Psychiatry. 2nd ed. [place], [place] [employer]; 2008. http://www.Storyful. [employer]. Accessed July 27, 2015.                Admitted

## 2025-04-17 NOTE — ED PROVIDER NOTE - NSICDXPASTMEDICALHX_GEN_ALL_CORE_FT
PAST MEDICAL HISTORY:  Alzheimer disease     Diabetes type 2     Disc disease with myelopathy, cervical     GERD (gastroesophageal reflux disease)     History of colitis     HTN (hypertension)     Myocardial infarction     Obstructive sleep apnea on CPAP     PFO (patent foramen ovale)     Radiculopathy, cervical     Rectus diastasis     Transient ischemic attack (TIA)

## 2025-04-17 NOTE — H&P ADULT - HISTORY OF PRESENT ILLNESS
Pt Name: JAYME DE JESUS  MRN: [id number]    Patient is a 69M s/p cervical I&D, ENRIQUE, C5-C6, duraseal revision ACDF C5-C7 on 04/14/2025 with Dr. Bass presenting to the emergency department with a with drainage at incision site. Patient was instructed to return to the ED after OR culture found growing Enterobacter Cloacae. Patient reports difficulty swallowing initially but admits is now improved. Incision has remained dry until he showered today. Pt reports dressing fell off and he noticed some discharge--patient applied a new dressing. Patient was discharged on bactrim and has been continuing as prescribed. Admits minor throat discomfort. Denies fever, chills, numbness, paresthesia, or acute motor changes.      REVIEW OF SYSTEMS    General: Alert, responsive, in NAD  Skin/Breast: No rashes, no pruritis   Ophthalmologic: No visual changes. No redness. 	  ENMT:	No discharge. No swelling.  Respiratory and Thorax: No difficulty breathing. No cough.   Cardiovascular:	No chest pain. No palpitations.  Gastrointestinal:	 No abdominal pain. No diarrhea.   Genitourinary: No dysuria. No bleeding.  Musculoskeletal: SEE HPI.  Neurological: No sensory or motor changes.   Psychiatric: No anxiety or depression.  Hematology/Lymphatics: No swelling.  Endocrine: No Hx of diabetes.    PAST MEDICAL & SURGICAL HISTORY:  Myocardial infarction  PFO (patent foramen ovale)  Transient ischemic attack (TIA)  History of colitis  Disc disease with myelopathy, cervical  Radiculopathy, cervical  Obstructive sleep apnea on CPAP  HTN (hypertension)  Diabetes type 2  GERD (gastroesophageal reflux disease)  Alzheimer disease  Rectus diastasis  H/O cardiac catheterization  History of surgical closure of patent foramen ovale (PFO)  S/P lumbar laminectomy  History of cholecystectomy    Allergies: No Known Allergies    Medications:     FAMILY HISTORY:  FH: heart disease (Father)    Family history of CVA (Mother)    : non-contributory    Social History:                           13.5   8.92  )-----------( 225      ( 17 Apr 2025 15:32 )             41.8     04-17    138  |  100  |  13.5  ----------------------------<  129[H]  4.2   |  26.0  |  0.90    Ca    9.6      17 Apr 2025 15:32    TPro  6.6  /  Alb  3.8  /  TBili  0.2[L]  /  DBili  x   /  AST  19  /  ALT  22  /  AlkPhos  66  04-17      PHYSICAL EXAM:    Vital Signs Last 24 Hrs  T(C): 36.6 (17 Apr 2025 13:05), Max: 36.6 (17 Apr 2025 13:05)  T(F): 97.9 (17 Apr 2025 13:05), Max: 97.9 (17 Apr 2025 13:05)  HR: 95 (17 Apr 2025 13:05) (95 - 95)  BP: 134/76 (17 Apr 2025 13:05) (134/76 - 134/76)  BP(mean): --  RR: 20 (17 Apr 2025 13:05) (20 - 20)  SpO2: 98% (17 Apr 2025 13:05) (98% - 98%)    Parameters below as of 17 Apr 2025 13:05  Patient On (Oxygen Delivery Method): room air      Daily Height in cm: 177.8 (17 Apr 2025 13:05)    Daily     Appearance: Alert, responsive, NAD  Skin: No rash on visible skin. Skin is clean, dry and intact. No bleeding. No abrasions. No ulcerations.  Neck: surgical dressing saturated on exam. Skin mildly erythematous about incision, no active drainage noted.  Scat serosanguinous fluid noted. New gauze dressing placed  Vascular: 2+ distal pulses. Cap refill < 2 sec. No signs of venous  insufficiency  or stasis. No extremity ulcerations. No cyanosis.  Musculoskeletal:       Left Upper Extremity: Atraumatic with normal alignment NROM. No crepitus. No bony tenderness. Gross motor intact     Right Upper Extremity: Atraumatic with normal alignment NROM. No crepitus. No bony tenderness. Gross motor intact     Left Lower Extremity: Atraumatic with normal alignment NROM. No crepitus. No bony tenderness.      Right Lower Extremity: Atraumatic with normal alignment NROM. No crepitus. No bony tenderness.   Neuro: Sensation is grossly intact to light touch. No focal deficits or weaknesses found.      A/P: Pt is a 69M w/ positive OR culture s/p cervical I&D, ENRIQUE, C5-C6, duraseal revision ACDF C5-C7  * Case d/w Dr. Bass  * Admit to orthopedics  * Pain control as clinically indicated  * Consult GI to r/o esophogeal perf  * PICC and IV meropenem per ID  * F/u definitive Kali plan   Pt Name: JAYME DE JESUS  MRN: [id number]    Patient is a 69M s/p anterior cervical discectomy and fusion (ACDF) at 2 levels on 04/02/2025 followed by revision of anterior cervical discectomy with fusion (ACDF) on 4/14/2025 with Dr. Bass now presenting to the emergency department with a with drainage at incision site. Patient was instructed to return to the ED after OR culture found growing Enterobacter Cloacae. Patient reports difficulty swallowing initially but admits is now improved. Incision has remained dry until he showered today. Pt reports dressing fell off and he noticed some discharge--patient applied a new dressing. Patient was discharged on bactrim and has been continuing as prescribed. Admits minor throat discomfort. Denies fever, chills, numbness, paresthesia, or acute motor changes.      REVIEW OF SYSTEMS    General: Alert, responsive, in NAD  Skin/Breast: No rashes, no pruritis   Ophthalmologic: No visual changes. No redness. 	  ENMT:	No discharge. No swelling.  Respiratory and Thorax: No difficulty breathing. No cough.   Cardiovascular:	No chest pain. No palpitations.  Gastrointestinal:	 No abdominal pain. No diarrhea.   Genitourinary: No dysuria. No bleeding.  Musculoskeletal: SEE HPI.  Neurological: No sensory or motor changes.   Psychiatric: No anxiety or depression.  Hematology/Lymphatics: No swelling.  Endocrine: No Hx of diabetes.    PAST MEDICAL & SURGICAL HISTORY:  Myocardial infarction  PFO (patent foramen ovale)  Transient ischemic attack (TIA)  History of colitis  Disc disease with myelopathy, cervical  Radiculopathy, cervical  Obstructive sleep apnea on CPAP  HTN (hypertension)  Diabetes type 2  GERD (gastroesophageal reflux disease)  Alzheimer disease  Rectus diastasis  H/O cardiac catheterization  History of surgical closure of patent foramen ovale (PFO)  S/P lumbar laminectomy  History of cholecystectomy    Allergies: No Known Allergies    Medications:     FAMILY HISTORY:  FH: heart disease (Father)    Family history of CVA (Mother)    : non-contributory    Social History:                           13.5   8.92  )-----------( 225      ( 17 Apr 2025 15:32 )             41.8     04-17    138  |  100  |  13.5  ----------------------------<  129[H]  4.2   |  26.0  |  0.90    Ca    9.6      17 Apr 2025 15:32    TPro  6.6  /  Alb  3.8  /  TBili  0.2[L]  /  DBili  x   /  AST  19  /  ALT  22  /  AlkPhos  66  04-17      PHYSICAL EXAM:    Vital Signs Last 24 Hrs  T(C): 36.6 (17 Apr 2025 13:05), Max: 36.6 (17 Apr 2025 13:05)  T(F): 97.9 (17 Apr 2025 13:05), Max: 97.9 (17 Apr 2025 13:05)  HR: 95 (17 Apr 2025 13:05) (95 - 95)  BP: 134/76 (17 Apr 2025 13:05) (134/76 - 134/76)  BP(mean): --  RR: 20 (17 Apr 2025 13:05) (20 - 20)  SpO2: 98% (17 Apr 2025 13:05) (98% - 98%)    Parameters below as of 17 Apr 2025 13:05  Patient On (Oxygen Delivery Method): room air      Daily Height in cm: 177.8 (17 Apr 2025 13:05)    Daily     Appearance: Alert, responsive, NAD  Skin: No rash on visible skin. Skin is clean, dry and intact. No bleeding. No abrasions. No ulcerations.  Neck: surgical dressing saturated on exam. Skin mildly erythematous about incision, no active drainage noted.  Scat serosanguinous fluid noted. New gauze dressing placed  Vascular: 2+ distal pulses. Cap refill < 2 sec. No signs of venous  insufficiency  or stasis. No extremity ulcerations. No cyanosis.  Musculoskeletal:       Left Upper Extremity: Atraumatic with normal alignment NROM. No crepitus. No bony tenderness. Gross motor intact     Right Upper Extremity: Atraumatic with normal alignment NROM. No crepitus. No bony tenderness. Gross motor intact     Left Lower Extremity: Atraumatic with normal alignment NROM. No crepitus. No bony tenderness.      Right Lower Extremity: Atraumatic with normal alignment NROM. No crepitus. No bony tenderness.   Neuro: Sensation is grossly intact to light touch. No focal deficits or weaknesses found.      A/P: Pt is a 69M w/ positive OR culture s/p revision of anterior cervical discectomy with fusion (ACDF) on 4/14/2025 with Dr. Bass  * Case d/w Dr. Bass  * Admit to orthopedics  * Pain control as clinically indicated  * Consult GI to r/o esophogeal perf  * PICC and IV meropenem per ID  * F/u definitive Kali plan

## 2025-04-17 NOTE — ED ADULT TRIAGE NOTE - HEIGHT IN FEET
5 Implemented All Universal Safety Interventions:  [place] to call system. Call bell, personal items and telephone within reach. Instruct patient to call for assistance. Room bathroom lighting operational. Non-slip footwear when patient is off stretcher. Physically safe environment: no spills, clutter or unnecessary equipment. Stretcher in lowest position, wheels locked, appropriate side rails in place.

## 2025-04-17 NOTE — ED PROVIDER NOTE - PHYSICAL EXAMINATION
General: Awake, alert, lying in bed in NAD  HEENT: Normocephalic, atraumatic. No scleral icterus or conjunctival injection. EOMI. Moist mucous membranes. Oropharynx clear.   Neck:. Soft and supple. Left anterior neck incision clean with minimal surrounding redness. Serous drainage from site  Cardiac: RRR, Peripheral pulses 2+ and symmetric. No LE edema.  Resp: Lungs CTAB. No accessory muscle use  Abd: Soft, non-tender, non-distended. No guarding, rebound, or rigidity.  Back: Spine midline and non-tender.   Skin: No rashes, abrasions, or lacerations.  Neuro: AO x 4. Moves all extremities symmetrically. Motor strength and sensation grossly intact.  Psych: Appropriate mood and affect

## 2025-04-17 NOTE — ED PROVIDER NOTE - ATTENDING CONTRIBUTION TO CARE
PT with recent ACDF and revision. Pt with clear drainage from wound and erythema. pt with enterobacter from wound.      physical - wound with drainage. rrr. ctab. abd - soft, nt.     plan - labs reviewed. ortho to admit for IV Abx and picc line placement.

## 2025-04-17 NOTE — H&P ADULT - NS ATTEND AMEND GEN_ALL_CORE FT
Patient seen in the emergency room.  Remi was told to come back to the emergency room for positive intraoperative cultures indicative of surgical site infection.  He denies any fevers or chills denies any lethargy states that he has no difficulty swallowing at this time that has improved.  But does state he has a sore throat.  He had some increased drainage over the past day otherwise he denies any lightheadedness nausea vomiting or headaches with positional changes.    Incision demonstrates mild serosanguineous drainage  Otherwise exam is benign and stable throughout bilateral upper and lower extremities    Plan is for admission PICC line and ID consult for IV antibiotics  We will obtain a GI consult to evaluate for possible esophageal perforation given the dysphagia upon prior admission and infection postoperatively is rare in the anterior cervical spine  We will continue to monitor for drainage

## 2025-04-17 NOTE — ED PROVIDER NOTE - OBJECTIVE STATEMENT
69M hx HTN, HLD, DM, MI, recent anterior cervical discectomy and fusion (4/2/25 with Brandenstein), s/p revision on 4/14/25 with Kali presents for wound cultures growing enterobacter. Pt reports noticing increased clear red drainage from the site. Denies fever, pain, painful swallowing.

## 2025-04-17 NOTE — ED ADULT NURSE NOTE - CHIEF COMPLAINT QUOTE
Atopic Dermatitis   Information for Patients and Families      What is atopic dermatitis?  Atopic dermatitis, or eczema, is a common skin disorder that affects 10-20% of children. It results in a rash and skin that is: (1) dry, (2) itchy, (3) inflamed/irritated, and (4) infected.    What causes atopic dermatitis?  Atopic dermatitis is caused by problems with the skin barrier leading to dry skin right from birth. In fact, certain genetic factors have been linked to poor skin barrier function including a special skin protein called  filaggrin.  An impaired skin barrier leads to more water loss from the skin so it becomes dry and itchy. Without this strong barrier, the skin also has trouble keeping out bacteria and other irritants. This leads to more skin irritation and skin infection/colonization with bacteria.    How can atopic dermatitis be treated?  Atopic dermatitis is a long-lasting condition, so there is no cure. However, you can control the symptoms of atopic dermatitis with good skin care. This includes regular bathing and application of moisturizers to the skin. This also included trying to decrease bacterial colonization on the skin by occasionally bathing in a diluted Clorox bath. (see below)    During times of  flares,  when the skin has patches that are red and itchy, you can help your child s skin heal faster by following the instructions below. It is important to treat all of the four skin problems at the same time: dryness, itchiness, inflammation, and infection.      Skin care instructions:  Take a 10-minute bath in lukewarm water every day.   No soap is needed, but if necessary use the gentle non-soap cleanser you and your dermatologist decided on for armpits, groin, hands, and feet.    After bath/bleach bath pat skin dry. Within 3 minutes, apply the following topical anti-inflammatory medications:  To rashes on the body, apply Triamcinolone twice daily as needed.    Follow with a thick  moisturizer. Use this moisturizer on top of the medications twice a day, even if no bath is taken. Avoid lotions.    When can I stop treatment?  Once your child no longer has an itchy, red, or scaly rash, you can start to decrease your use of the topical steroids and antihistamines. However, since atopic dermatitis is a long-lasting disorder, it is important to CONTINUE regular bathing and moisturizing as well as occasional dilute bleach baths. This will help prevent your child s atopic dermatitis from getting worse and hopefully prevent outbreaks.      Willing to provide feedback on your visit today?   As part of Leandro Mcclendon DO's training, his residency program collects feedback on how your visit went today. If you have a few minutes to complete this survey sharing what went well and what he could improve on, it would be so appreciated. Thanks in advance!    Scan the QR code below or head to [email or web address]            Patient Education    BRIGHT FUTURES HANDOUT- PARENT  3 YEAR VISIT  Here are some suggestions from [employer] experts that may be of value to your family.     HOW YOUR FAMILY IS DOING  Take time for yourself and to be with your partner.  Stay connected to friends, their personal interests, and work.  Have regular playtimes and mealtimes together as a family.  Give your child hugs. Show your child how much you love him.  Show your child how to handle anger well--time alone, respectful talk, or being active. Stop hitting, biting, and fighting right away.  Give your child the chance to make choices.  Don t smoke or use e-cigarettes. Keep your home and car smoke-free. Tobacco-free spaces keep children healthy.  Don t use alcohol or drugs.  If you are worried about your living or food situation, talk with us. Community agencies and programs such as WIC and SNAP can also provide information and assistance.    EATING HEALTHY AND BEING ACTIVE  Give your child 16 to 24 oz of milk every day.  Limit  juice. It is not necessary. If you choose to serve juice, give no more than 4 oz a day of 100% juice and always serve it with a meal.  Let your child have cool water when she is thirsty.  Offer a variety of healthy foods and snacks, especially vegetables, fruits, and lean protein.  Let your child decide how much to eat.  Be sure your child is active at home and in  or .  Apart from sleeping, children should not be inactive for longer than 1 hour at a time.  Be active together as a family.  Limit TV, tablet, or smartphone use to no more than 1 hour of high-quality programs each day.  Be aware of what your child is watching.  Don t put a TV, computer, tablet, or smartphone in your child s bedroom.  Consider making a family media plan. It helps you make rules for media use and balance screen time with other activities, including exercise.    PLAYING WITH OTHERS  Give your child a variety of toys for dressing up, make-believe, and imitation.  Make sure your child has the chance to play with other preschoolers often. Playing with children who are the same age helps get your child ready for school.  Help your child learn to take turns while playing games with other children.    READING AND TALKING WITH YOUR CHILD  Read books, sing songs, and play rhyming games with your child each day.  Use books as a way to talk together. Reading together and talking about a book s story and pictures helps your child learn how to read.  Look for ways to practice reading everywhere you go, such as stop signs, or labels and signs in the store.  Ask your child questions about the story or pictures in books. Ask him to tell a part of the story.  Ask your child specific questions about his day, friends, and activities.    SAFETY  Continue to use a car safety seat that is installed correctly in the back seat. The safest seat is one with a 5-point harness, not a booster seat.  Prevent choking. Cut food into small  pieces.  Supervise all outdoor play, especially near streets and driveways.  Never leave your child alone in the car, house, or yard.  Keep your child within arm s reach when she is near or in water. She should always wear a life jacket when on a boat.  Teach your child to ask if it is OK to pet a dog or another animal before touching it.  If it is necessary to keep a gun in your home, store it unloaded and locked with the ammunition locked separately.  Ask if there are guns in homes where your child plays. If so, make sure they are stored safely.    WHAT TO EXPECT AT YOUR CHILD S 4 YEAR VISIT  We will talk about  Caring for your child, your family, and yourself  Getting ready for school  Eating healthy  Promoting physical activity and limiting TV time  Keeping your child safe at home, outside, and in the car      Helpful Resources: Smoking Quit Line: 484.982.6171  Family Media Use Plan: www.healthychildren.org/MediaUsePlan  Poison Help Line:  419.489.1773  Information About Car Safety Seats: www.safercar.gov/parents  Toll-free Auto Safety Hotline: 590.792.2397  Consistent with Bright Futures: Guidelines for Health Supervision of Infants, Children, and Adolescents, 4th Edition  For more information, go to https://brightfutures.aap.org.            Patient presents to ED for medical evaluation, patient advised to come to ED due to possible infection on surgical site, patient had cervical SX on 4/13, denies chills/fever.

## 2025-04-17 NOTE — ED ADULT NURSE NOTE - OBJECTIVE STATEMENT
Received pt AOx4 referred to the ED for abnormal labs. Pt states he had a anterior cervical discectomy 4/16, noted drainage and went to his MD who did blood work and cultured the site. Pt denies f/c, n/v, SOB, chest pain. Respirations even & unlabored. NAD. Pt made aware of plan of care and verbalized understanding.

## 2025-04-17 NOTE — PATIENT PROFILE ADULT - FALL HARM RISK - UNIVERSAL INTERVENTIONS
Bed in lowest position, wheels locked, appropriate side rails in place/Call bell, personal items and telephone in reach/Instruct patient to call for assistance before getting out of bed or chair/Non-slip footwear when patient is out of bed/[place] to call system/Physically safe environment - no spills, clutter or unnecessary equipment/Purposeful Proactive Rounding/Room/bathroom lighting operational, light cord in reach

## 2025-04-17 NOTE — ED PROVIDER NOTE - CLINICAL SUMMARY MEDICAL DECISION MAKING FREE TEXT BOX
69M hx HTN, HLD, DM, MI, recent anterior cervical discectomy and fusion (4/2/25 with Brandenstein), s/p revision on 4/14/25 with Kali presents for wound cultures growing enterobacter and increased drainage. Discussed with ortho - plan for admission for PICC line placement

## 2025-04-17 NOTE — PROGRESS NOTE ADULT - SUBJECTIVE AND OBJECTIVE BOX
Patient seen at bedside and updated him on plan for tomorrow   Patient to remain NPO until GI rules out esophogeal tear   Patient will go for scope tomorrow with GI team   He expressed understanding

## 2025-04-18 LAB
GLUCOSE BLDC GLUCOMTR-MCNC: 120 MG/DL — HIGH (ref 70–99)
GLUCOSE BLDC GLUCOMTR-MCNC: 133 MG/DL — HIGH (ref 70–99)
GLUCOSE BLDC GLUCOMTR-MCNC: 99 MG/DL — SIGNIFICANT CHANGE UP (ref 70–99)

## 2025-04-18 PROCEDURE — G0545: CPT

## 2025-04-18 PROCEDURE — 99222 1ST HOSP IP/OBS MODERATE 55: CPT | Mod: FS

## 2025-04-18 PROCEDURE — 99233 SBSQ HOSP IP/OBS HIGH 50: CPT

## 2025-04-18 PROCEDURE — 99223 1ST HOSP IP/OBS HIGH 75: CPT | Mod: FS

## 2025-04-18 PROCEDURE — 71250 CT THORAX DX C-: CPT | Mod: 26

## 2025-04-18 RX ORDER — GLUCAGON 3 MG/1
1 POWDER NASAL ONCE
Refills: 0 | Status: DISCONTINUED | OUTPATIENT
Start: 2025-04-18 | End: 2025-04-21

## 2025-04-18 RX ORDER — DEXTROSE 50 % IN WATER 50 %
12.5 SYRINGE (ML) INTRAVENOUS ONCE
Refills: 0 | Status: DISCONTINUED | OUTPATIENT
Start: 2025-04-18 | End: 2025-04-21

## 2025-04-18 RX ORDER — INSULIN LISPRO 100 U/ML
INJECTION, SOLUTION INTRAVENOUS; SUBCUTANEOUS AT BEDTIME
Refills: 0 | Status: DISCONTINUED | OUTPATIENT
Start: 2025-04-18 | End: 2025-04-21

## 2025-04-18 RX ORDER — SODIUM CHLORIDE 9 G/1000ML
1000 INJECTION, SOLUTION INTRAVENOUS
Refills: 0 | Status: DISCONTINUED | OUTPATIENT
Start: 2025-04-18 | End: 2025-04-21

## 2025-04-18 RX ORDER — DEXTROSE 50 % IN WATER 50 %
25 SYRINGE (ML) INTRAVENOUS ONCE
Refills: 0 | Status: DISCONTINUED | OUTPATIENT
Start: 2025-04-18 | End: 2025-04-21

## 2025-04-18 RX ORDER — INSULIN LISPRO 100 U/ML
INJECTION, SOLUTION INTRAVENOUS; SUBCUTANEOUS
Refills: 0 | Status: DISCONTINUED | OUTPATIENT
Start: 2025-04-18 | End: 2025-04-21

## 2025-04-18 RX ORDER — DEXTROSE 50 % IN WATER 50 %
15 SYRINGE (ML) INTRAVENOUS ONCE
Refills: 0 | Status: DISCONTINUED | OUTPATIENT
Start: 2025-04-18 | End: 2025-04-21

## 2025-04-18 RX ADMIN — MEMANTINE HYDROCHLORIDE 10 MILLIGRAM(S): 21 CAPSULE, EXTENDED RELEASE ORAL at 17:02

## 2025-04-18 RX ADMIN — CITALOPRAM 10 MILLIGRAM(S): 20 TABLET ORAL at 13:14

## 2025-04-18 RX ADMIN — CARVEDILOL 6.25 MILLIGRAM(S): 3.12 TABLET, FILM COATED ORAL at 17:03

## 2025-04-18 RX ADMIN — Medication 40 MILLIGRAM(S): at 05:25

## 2025-04-18 RX ADMIN — Medication 975 MILLIGRAM(S): at 14:13

## 2025-04-18 RX ADMIN — EZETIMIBE 10 MILLIGRAM(S): 10 TABLET ORAL at 13:14

## 2025-04-18 RX ADMIN — GABAPENTIN 300 MILLIGRAM(S): 400 CAPSULE ORAL at 05:25

## 2025-04-18 RX ADMIN — Medication 975 MILLIGRAM(S): at 06:25

## 2025-04-18 RX ADMIN — LOSARTAN POTASSIUM 50 MILLIGRAM(S): 100 TABLET, FILM COATED ORAL at 05:25

## 2025-04-18 RX ADMIN — OXYBUTYNIN CHLORIDE 10 MILLIGRAM(S): 5 TABLET, FILM COATED, EXTENDED RELEASE ORAL at 17:01

## 2025-04-18 RX ADMIN — Medication 975 MILLIGRAM(S): at 21:13

## 2025-04-18 RX ADMIN — SODIUM CHLORIDE 75 MILLILITER(S): 9 INJECTION, SOLUTION INTRAVENOUS at 05:27

## 2025-04-18 RX ADMIN — Medication 81 MILLIGRAM(S): at 13:14

## 2025-04-18 RX ADMIN — MEMANTINE HYDROCHLORIDE 10 MILLIGRAM(S): 21 CAPSULE, EXTENDED RELEASE ORAL at 05:25

## 2025-04-18 RX ADMIN — MEROPENEM 1000 MILLIGRAM(S): 1 INJECTION INTRAVENOUS at 13:14

## 2025-04-18 RX ADMIN — MEROPENEM 1000 MILLIGRAM(S): 1 INJECTION INTRAVENOUS at 05:23

## 2025-04-18 RX ADMIN — TAMSULOSIN HYDROCHLORIDE 0.4 MILLIGRAM(S): 0.4 CAPSULE ORAL at 21:12

## 2025-04-18 RX ADMIN — MEROPENEM 1000 MILLIGRAM(S): 1 INJECTION INTRAVENOUS at 21:13

## 2025-04-18 RX ADMIN — GABAPENTIN 300 MILLIGRAM(S): 400 CAPSULE ORAL at 17:02

## 2025-04-18 RX ADMIN — Medication 975 MILLIGRAM(S): at 13:13

## 2025-04-18 RX ADMIN — ROSUVASTATIN CALCIUM 40 MILLIGRAM(S): 20 TABLET, FILM COATED ORAL at 21:13

## 2025-04-18 RX ADMIN — Medication 975 MILLIGRAM(S): at 05:25

## 2025-04-18 RX ADMIN — Medication 975 MILLIGRAM(S): at 22:13

## 2025-04-18 NOTE — CONSULT NOTE ADULT - ASSESSMENT
Patient is a 69M s/p anterior cervical discectomy and fusion (ACDF) at 2 levels on 04/02/2025 followed by revision of anterior cervical discectomy with fusion (ACDF) on 4/14/2025 with Dr. Bass now presenting to the emergency department with a with drainage at incision site. Patient was instructed to return to the ED after OR culture found growing Enterobacter Cloacae. GI asked to consult to r/o esophageal injury.    #s/p ACDF and revision (4/15/25)   #r/o esophageal injury   #bacteremia  low suspicion for esophageal injury, pt has been tolerating diet without issue   blood cultures + Enterobacter Cloacae  -Trend CBC and BMP daily   -ID following, continue with Abx per ID recommendations   -please check CT chest with PO contrast   -Please keep NPO for now   -No plans for urgent or emergent endoscopic procedures at this time   -Will determine need for EGD based on imaging   D/w pt, daughter and wife, all in agreement with plan  _________________________________________________________________  Assessment and recommendations are final when note is signed by the attending physician. 
68 y/o male with Hx significant for MI, s/p PCI x 11, TIA, PFO s/p PFO closure, HTN, HLD, DM2, colitis, alzheimer's, and MILLICENT on CPAP, he was previous diagnosed in 2023 with cervical myelopathy and lumbar disc disease at which time he underwent a lumbar laminectomy at \[facility]", He states he noted an improvement in his neck pain after surgery but in the past couple of months has noted a progressive worsening in pain, he was having neck pain, he came in here for elective anterior cervical discectomy and fusion C5-C6, C5-C7 with Dr. Spencer on 4/2/25 s/p surgery, he was discharged after surgery, comes back with draining wound on his neck.   Patient underwent revision of anterior cervical discectomy with fusion (ACDF) on 4/14/2025 with Dr. Bass now presenting to the emergency department with drainage at incision site. Patient was instructed to return to the ED after OR culture found growing Enterobacter Cloacae. Patient reported difficulty swallowing initially but admits is now improved. Patient was discharged on bactrim and has been continuing as prescribed. Medicine consult requested for co-management.    Cervical Myelopathy with Cervical surgical site infection with concern for esophageal tear  S/p ACDF C5-C7 4/2/25.  S/p Revision ACDF C5-7, durotomy repair 4/14/25  OR cultures grew Enterobacter cloacae.  Blood cultures from 4/14/25 negative.  ID following.  Continue Meropenem, likely transition to Invanz on discharge for 6 weeks.  PICC line ordered.  GI consulted.  Recommended CT chest with oral contrast.  EGD depending on results of CT.  Patient remains NPO.  Would consider IVF while NPO.    CAD  Continue ASA and statin.    TIA  Continue ASA and statin.    HTN  Continue Coreg and Losartan.    HLD  Continue Zetia and statin.    DM2  Would check accuchecks qAC and qHS with sliding scale coverage.  A1c 6.9.  ADA diet when diet advanced.  Hold Jardiance and Mounjaro while in hospital, resume on discharge.    Alzheimer's   Continue Namenda    MILLICENT  CPAP at night.  Nocturnal .    DVT prophylaxis  SCDs.

## 2025-04-18 NOTE — PROGRESS NOTE ADULT - SUBJECTIVE AND OBJECTIVE BOX
INFECTIOUS DISEASES AND INTERNAL MEDICINE at [place]  =======================================================  Adriano Veliz MD  Diplomates [employer] Board of Internal Medicine and Infectious Diseases  Telephone [phone]  Fax            [phone]  =======================================================    JAYME DE JESUS [id number]    Follow up:  [facility]    Allergies:  No Known Allergies      Medications:  acetaminophen     Tablet .. 975 milliGRAM(s) Oral every 8 hours  artificial  tears Solution 1 Drop(s) Both EYES daily  aspirin enteric coated 81 milliGRAM(s) Oral daily  carvedilol 6.25 milliGRAM(s) Oral every 12 hours  citalopram 10 milliGRAM(s) Oral daily  ezetimibe 10 milliGRAM(s) Oral daily  gabapentin 300 milliGRAM(s) Oral two times a day  lactated ringers. 1000 milliLiter(s) IV Continuous <Continuous>  losartan 50 milliGRAM(s) Oral daily  memantine 10 milliGRAM(s) Oral two times a day  meropenem Injectable 1000 milliGRAM(s) IV Push every 8 hours  oxybutynin 10 milliGRAM(s) Oral two times a day  pantoprazole    Tablet 40 milliGRAM(s) Oral before breakfast  rosuvastatin 40 milliGRAM(s) Oral at bedtime  tamsulosin 0.4 milliGRAM(s) Oral at bedtime    SOCIAL       FAMILY   FAMILY HISTORY:  FH: heart disease (Father)    Family history of CVA (Mother)      REVIEW OF SYSTEMS:  CONSTITUTIONAL:  No Fever or chills  HEENT:   No diplopia or blurred vision.  No earache, sore throat or runny nose.  CARDIOVASCULAR:  No pressure, squeezing, strangling, tightness, heaviness or aching about the chest, neck, axilla or epigastrium.  RESPIRATORY:  No cough, shortness of breath, PND or orthopnea.  GASTROINTESTINAL:  No nausea, vomiting or diarrhea.  GENITOURINARY:  No dysuria, frequency or urgency. No Blood in urine  MUSCULOSKELETAL:   moves all joints  SKIN:  No change in skin, hair or nails.  NEUROLOGIC:  No paresthesias, fasciculations, seizures or weakness.  PSYCHIATRIC:  No disorder of thought or mood.  ENDOCRINE:  No heat or cold intolerance, polyuria or polydipsia.  HEMATOLOGICAL:  No easy bruising or bleeding.            Physical Exam:  ICU Vital Signs Last 24 Hrs  T(C): 36.7 (18 Apr 2025 04:00), Max: 36.8 (17 Apr 2025 20:00)  T(F): 98 (18 Apr 2025 04:00), Max: 98.2 (17 Apr 2025 20:00)  HR: 69 (18 Apr 2025 04:00) (69 - 95)  BP: 156/75 (18 Apr 2025 04:00) (134/76 - 168/91)  BP(mean): --  ABP: --  ABP(mean): --  RR: 18 (18 Apr 2025 04:00) (17 - 20)  SpO2: 91% (18 Apr 2025 04:00) (91% - 98%)    O2 Parameters below as of 18 Apr 2025 04:00  Patient On (Oxygen Delivery Method): room air          GEN: NAD,   HEENT: normocephalic and atraumatic. EOMI. JUSTICE.    NECK: Supple. No carotid bruits.  No lymphadenopathy or thyromegaly. DRESSING IN PLACE  LUNGS: Clear to auscultation.  HEART: Regular rate and rhythm without murmur.  ABDOMEN: Soft, nontender, and nondistended.  Positive bowel sounds.    : No CVA tenderness  EXTREMITIES: Without any cyanosis, clubbing, rash, lesions or edema.  MSK: no joint swelling  NEUROLOGIC: Cranial nerves II through XII are grossly intact.  PSYCHIATRIC: Appropriate affect .  SKIN: No ulceration or induration present.        Labs:  Vitals:  ============  T(F): 98 (18 Apr 2025 04:00), Max: 98.2 (17 Apr 2025 20:00)  HR: 69 (18 Apr 2025 04:00)  BP: 156/75 (18 Apr 2025 04:00)  RR: 18 (18 Apr 2025 04:00)  SpO2: 91% (18 Apr 2025 04:00) (91% - 98%)  temp max in last 48H T(F): , Max: 98.2 (04-17-25 @ 20:00)    =======================================================  Current Antibiotics:  meropenem Injectable 1000 milliGRAM(s) IV Push every 8 hours    Other medications:  acetaminophen     Tablet .. 975 milliGRAM(s) Oral every 8 hours  artificial  tears Solution 1 Drop(s) Both EYES daily  aspirin enteric coated 81 milliGRAM(s) Oral daily  carvedilol 6.25 milliGRAM(s) Oral every 12 hours  citalopram 10 milliGRAM(s) Oral daily  ezetimibe 10 milliGRAM(s) Oral daily  gabapentin 300 milliGRAM(s) Oral two times a day  lactated ringers. 1000 milliLiter(s) IV Continuous <Continuous>  losartan 50 milliGRAM(s) Oral daily  memantine 10 milliGRAM(s) Oral two times a day  oxybutynin 10 milliGRAM(s) Oral two times a day  pantoprazole    Tablet 40 milliGRAM(s) Oral before breakfast  rosuvastatin 40 milliGRAM(s) Oral at bedtime  tamsulosin 0.4 milliGRAM(s) Oral at bedtime      =======================================================  Labs:                        13.5   8.92  )-----------( 225      ( 17 Apr 2025 15:32 )             41.8     04-17    138  |  100  |  13.5  ----------------------------<  129[H]  4.2   |  26.0  |  0.90    Ca    9.6      17 Apr 2025 15:32    TPro  6.6  /  Alb  3.8  /  TBili  0.2[L]  /  DBili  x   /  AST  19  /  ALT  22  /  AlkPhos  66  04-17      Culture - Wound Aerobic/Anaerobic (collected 04-14-25 @ 17:57)  Source: Surgical Swab cervical deep  Preliminary Report (04-17-25 @ 08:11):    Rare Enterobacter cloacae complex    See previous culture [id number] for susceptibility    Culture - Wound Aerobic/Anaerobic (collected 04-14-25 @ 17:56)  Source: Surgical Swab cervical superficial #2  Preliminary Report (04-16-25 @ 13:12):    Rare Enterobacter cloacae complex  Organism: Enterobacter cloacae complex (04-17-25 @ 08:12)  Organism: Enterobacter cloacae complex (04-17-25 @ 08:12)    Sensitivities:      -  Levofloxacin: S <=0.5      -  Tobramycin: S <=2      -  Aztreonam: S <=4      -  Gentamicin: S <=2      -  Cefazolin: R >16      -  Cefepime: S <=2      -  Piperacillin/Tazobactam: S <=8 Treatment with Pipercillin/Tazobactam is not recommended in severe infections casued by Klebsiella aerogenes, Enterobacter cloacae complex, and Citrobacter freundii complex.      -  Ciprofloxacin: S <=0.25      -  Imipenem: S <=1      -  Ceftriaxone: S <=1 Enterobacter cloacae, Klebsiella aerogenes, and Citrobacter freundii may develop resistance during prolonged therapy.      -  Ampicillin: R 16 These ampicillin results predict results for amoxicillin      Method Type: LUCIANA      -  Meropenem: S <=1      -  Ampicillin/Sulbactam: R 8/4      -  Cefoxitin: R >16      -  Amoxicillin/Clavulanic Acid: R >16/8      -  Trimethoprim/Sulfamethoxazole: S <=0.5/9.5      -  Ertapenem: S <=0.5    Culture - Wound Aerobic/Anaerobic (collected 04-14-25 @ 17:55)  Source: Surgical Swab cervical superficial #1  Preliminary Report (04-17-25 @ 08:16):    Rare Enterobacter cloacae complex  Organism: Enterobacter cloacae complex (04-17-25 @ 08:15)  Organism: Enterobacter cloacae complex (04-17-25 @ 08:15)    Sensitivities:      -  Levofloxacin: S <=0.5      -  Tobramycin: S <=2      -  Aztreonam: S <=4      -  Gentamicin: S <=2      -  Cefazolin: R >16      -  Cefepime: S <=2      -  Piperacillin/Tazobactam: S <=8 Treatment with Pipercillin/Tazobactam is not recommended in severe infections casued by Klebsiella aerogenes, Enterobacter cloacae complex, and Citrobacter freundii complex.      -  Ciprofloxacin: S <=0.25      -  Imipenem: S <=1      -  Ceftriaxone: S <=1 Enterobacter cloacae, Klebsiella aerogenes, and Citrobacter freundii may develop resistance during prolonged therapy.      -  Ampicillin: R 16 These ampicillin results predict results for amoxicillin      Method Type: LUCIANA      -  Meropenem: S <=1      -  Ampicillin/Sulbactam: R 8/4      -  Cefoxitin: R >16      -  Amoxicillin/Clavulanic Acid: R >16/8      -  Trimethoprim/Sulfamethoxazole: S <=0.5/9.5      -  Ertapenem: S <=0.5    Culture - Blood (collected 04-14-25 @ 05:00)  Source: Blood Blood-Peripheral  Preliminary Report (04-17-25 @ 09:01):    No growth at 72 Hours      Creatinine: 0.90 mg/dL (04-17-25 @ 15:32)  Creatinine: 0.90 mg/dL (04-14-25 @ 05:00)  Creatinine: 0.98 mg/dL (04-13-25 @ 12:29)          C-Reactive Protein: 13 mg/L (04-17-25 @ 15:32)  C-Reactive Protein: 21 mg/L (04-15-25 @ 06:46)    WBC Count: 8.92 K/uL (04-17-25 @ 15:32)  WBC Count: 14.83 K/uL (04-14-25 @ 05:00)  WBC Count: 13.95 K/uL (04-13-25 @ 12:29)        Alkaline Phosphatase: 66 U/L (04-17-25 @ 15:32)  Alanine Aminotransferase (ALT/SGPT): 22 U/L (04-17-25 @ 15:32)  Aspartate Aminotransferase (AST/SGOT): 19 U/L (04-17-25 @ 15:32)  Bilirubin Total: 0.2 mg/dL (04-17-25 @ 15:32)

## 2025-04-18 NOTE — PROGRESS NOTE ADULT - ASSESSMENT
Patient is a 69M s/p anterior cervical discectomy and fusion (ACDF) at 2 levels on 04/02/2025 followed by revision of anterior cervical discectomy with fusion (ACDF) on 4/14/2025 with Dr. Bass now presenting to the emergency department with a with drainage at incision site. Patient was instructed to return to the ED after OR culture found growing Enterobacter Cloacae. Patient reports difficulty swallowing initially but admits is now improved. Incision has remained dry until he showered today. Pt reports dressing fell off and he noticed some discharge--patient applied a new dressing. Patient was discharged on bactrim and has been continuing as prescribed. Admits minor throat discomfort. Denies fever, chills, numbness, paresthesia, or acute motor changes.  AS ABOVE PT NOW WITH POSITIVE ENTEROBACTER CLOACAE IN OPERATIVE CX CALLED BACK  FOR Admission  PT NON TOXIC DENIES FEVERS   PT WITH HARDWARE IN PLACE ALTHOUGH NO GROSS PUS SEE IN OR  WILL PLAN FOR 6 WEEKS IV ABX FOF PRESUMED DISC ISSUE     ON MERREM NOW   ORDERED PICC LINE   WILL LIKELY SEND OUT ON INVANZ ONCE  A DAY FOR CONVENIENCE  WILL CHANGE TO INVANZ 1GM Q 24 THROUGH 5/29/25  WEEKLY CBC CMP SED RATE CRP  SPOKE TO WIFE AT BEDSIDE   WILL FOLLOW  WITH FURTHER RECOMMENDATIONS       Patient is a 69M s/p anterior cervical discectomy and fusion (ACDF) at 2 levels on 04/02/2025 followed by revision of anterior cervical discectomy with fusion (ACDF) on 4/14/2025 with Dr. Bass now presenting to the emergency department with a with drainage at incision site. Patient was instructed to return to the ED after OR culture found growing Enterobacter Cloacae. Patient reports difficulty swallowing initially but admits is now improved. Incision has remained dry until he showered today. Pt reports dressing fell off and he noticed some discharge--patient applied a new dressing. Patient was discharged on bactrim and has been continuing as prescribed. Admits minor throat discomfort. Denies fever, chills, numbness, paresthesia, or acute motor changes.  AS ABOVE PT NOW WITH POSITIVE ENTEROBACTER CLOACAE IN OPERATIVE CX CALLED BACK  FOR Admission  PT NON TOXIC DENIES FEVERS   PT WITH HARDWARE IN PLACE ALTHOUGH NO GROSS PUS SEE IN OR  WILL PLAN FOR 6 WEEKS IV ABX FOF PRESUMED DISC ISSUE   PT FOR GI EVAL ? EGD    ON MERREM NOW   ORDERED PICC LINE   WILL LIKELY SEND OUT ON INVANZ ONCE  A DAY FOR CONVENIENCE  WILL CHANGE TO INVANZ 1GM Q 24 THROUGH 5/29/25  WEEKLY CBC CMP SED RATE CRP  SPOKE TO WIFE AT BEDSIDE   WILL FOLLOW  WITH FURTHER RECOMMENDATIONS

## 2025-04-18 NOTE — CONSULT NOTE ADULT - NS ATTEND AMEND GEN_ALL_CORE FT
Mr. Moore is a 69 year old gentleman with history of anterior cervical discectomy and fusion on 4/2/2025 followed by revision on 4/14/2025 with Dr. Bass presenting with increased incisional drainage, CT with contrast without evidence of esophageal leak. Patient without clinical stigmata concerning for esophageal perforation. There is a fluid collection within the surgical bed anterior to the left SCM measuring 4.8 x 2.3 x 4.0 cm with a few foci of gas, which may be postsurgical or a developing abscess. Low suspicion for esophageal perforation / disruption. Would defer endoscopy at this time. Coughing with PO intake, would consider speech pathology consultation to rule out oropharyngeal dysphagia. Discussed in detail with patient and family at bedside. Please contact GI service for any additional questions or concerns.

## 2025-04-18 NOTE — PROGRESS NOTE ADULT - SUBJECTIVE AND OBJECTIVE BOX
ORTHO-SPINE PROGRESS NOTE:    Pt Name: JAYME DE JESUS    MRN: [id number]      Patient seen and examined at bedside, he is doing well. Plan is to go for scope today with GI to rule out possible esophageal tear.   No new orthopedic complaints.    PAST MEDICAL & SURGICAL HISTORY:  PAST MEDICAL & SURGICAL HISTORY:  Myocardial infarction      PFO (patent foramen ovale)      Transient ischemic attack (TIA)      History of colitis      Disc disease with myelopathy, cervical      Radiculopathy, cervical      Obstructive sleep apnea on CPAP      HTN (hypertension)      Diabetes type 2      GERD (gastroesophageal reflux disease)      Alzheimer disease      Rectus diastasis      H/O cardiac catheterization      History of surgical closure of patent foramen ovale (PFO)      S/P lumbar laminectomy      History of cholecystectomy          Allergies: No Known Allergies                                13.5   8.92  )-----------( 225      ( 17 Apr 2025 15:32 )             41.8     04-17    138  |  100  |  13.5  ----------------------------<  129[H]  4.2   |  26.0  |  0.90    Ca    9.6      17 Apr 2025 15:32    TPro  6.6  /  Alb  3.8  /  TBili  0.2[L]  /  DBili  x   /  AST  19  /  ALT  22  /  AlkPhos  66  04-17      PHYSICAL EXAM:    Vital Signs Last 24 Hrs  T(C): 36.7 (18 Apr 2025 04:00), Max: 36.8 (17 Apr 2025 20:00)  T(F): 98 (18 Apr 2025 04:00), Max: 98.2 (17 Apr 2025 20:00)  HR: 69 (18 Apr 2025 04:00) (69 - 95)  BP: 156/75 (18 Apr 2025 04:00) (134/76 - 168/91)  BP(mean): --  RR: 18 (18 Apr 2025 04:00) (17 - 20)  SpO2: 91% (18 Apr 2025 04:00) (91% - 98%)    Parameters below as of 18 Apr 2025 04:00  Patient On (Oxygen Delivery Method): room air      Daily Height in cm: 177.8 (17 Apr 2025 13:05)    Daily     Appearance: Alert, responsive, in no acute distress.      Skin: no rash on visible skin. Skin is clean, dry and intact. No bleeding. No abrasions. No ulcerations.   crevical spine dressing remains intact, some drainage present. Removed and changed today.              Sensation:          Upper extremity            light touch sensation intact C5-T1 bilaterally          Lower extremity             light touch sensation intact T12-S1 bilaterally            Motor exam:          Lower extremity                          IS        Quad   TA          EHL        GS                                               R        5/5        5/5       5/5       5/5         5/5                                               L         5/5        5/5 5/5       5/5          5/5                         A/P:  Pt is a  69y Male status postACDF C5-C6, C6-C7 POD 12 on 4/2, S/P revision ACDF, repair of incidental durotomy, I and D of seroma 4/14.       Plan:   - continue ABX per ID  - will remain NPO, plan is to go for scope today with GI to r/o esophagal tear      ORTHO-SPINE PROGRESS NOTE:    Pt Name: JAYME DE JESUS    MRN: [id number]      Patient is a 69 year old male status post ACDF C5-C6, C6-C7 POD 12 on 4/2, S/P revision ACDF, repair of incidental durotomy, I and D of seroma 4/14.   Patient seen and examined at bedside, he is doing well. Patient readmitted due to positive cultures. Plan is to go for scope today with GI to rule out possible esophageal tear.   No new orthopedic complaints.    PAST MEDICAL & SURGICAL HISTORY:  PAST MEDICAL & SURGICAL HISTORY:  Myocardial infarction      PFO (patent foramen ovale)      Transient ischemic attack (TIA)      History of colitis      Disc disease with myelopathy, cervical      Radiculopathy, cervical      Obstructive sleep apnea on CPAP      HTN (hypertension)      Diabetes type 2      GERD (gastroesophageal reflux disease)      Alzheimer disease      Rectus diastasis      H/O cardiac catheterization      History of surgical closure of patent foramen ovale (PFO)      S/P lumbar laminectomy      History of cholecystectomy          Allergies: No Known Allergies                                13.5   8.92  )-----------( 225      ( 17 Apr 2025 15:32 )             41.8     04-17    138  |  100  |  13.5  ----------------------------<  129[H]  4.2   |  26.0  |  0.90    Ca    9.6      17 Apr 2025 15:32    TPro  6.6  /  Alb  3.8  /  TBili  0.2[L]  /  DBili  x   /  AST  19  /  ALT  22  /  AlkPhos  66  04-17      PHYSICAL EXAM:    Vital Signs Last 24 Hrs  T(C): 36.7 (18 Apr 2025 04:00), Max: 36.8 (17 Apr 2025 20:00)  T(F): 98 (18 Apr 2025 04:00), Max: 98.2 (17 Apr 2025 20:00)  HR: 69 (18 Apr 2025 04:00) (69 - 95)  BP: 156/75 (18 Apr 2025 04:00) (134/76 - 168/91)  BP(mean): --  RR: 18 (18 Apr 2025 04:00) (17 - 20)  SpO2: 91% (18 Apr 2025 04:00) (91% - 98%)    Parameters below as of 18 Apr 2025 04:00  Patient On (Oxygen Delivery Method): room air      Daily Height in cm: 177.8 (17 Apr 2025 13:05)    Daily     Appearance: Alert, responsive, in no acute distress.      Skin: no rash on visible skin. Skin is clean, dry and intact. No bleeding. No abrasions. No ulcerations.   cervical spine dressing remains intact, some drainage present. Removed and changed today.              Sensation:          Upper extremity            light touch sensation intact C5-T1 bilaterally          Lower extremity             light touch sensation intact T12-S1 bilaterally            Motor exam:          Lower extremity                          IS        Quad   TA          EHL        GS                                               R        5/5        5/5       5/5       5/5         5/5                                               L         5/5        5/5       5/5       5/5          5/5                         A/P:  Pt is a  69y Male status postACDF C5-C6, C6-C7 POD 12 on 4/2, S/P revision ACDF, repair of incidental durotomy, I and D of seroma 4/14.       Plan:   - continue ABX per ID  - will remain NPO, plan is to go for scope today with GI to r/o esophagal tear      ORTHO-SPINE PROGRESS NOTE:    Pt Name: JAYME DE JESUS    MRN: [id number]      Patient is a 69 year old male status post ACDF C5-C6, C6-C7 POD 12 on 4/2, S/P revision ACDF, repair of incidental durotomy, I and D of seroma 4/14.   Patient seen and examined at bedside, he is doing well. Patient readmitted due to positive cultures. Plan is for patient to go for CT chest with PO contrast to rule out esophageal perforation.   No new orthopedic complaints.    PAST MEDICAL & SURGICAL HISTORY:  PAST MEDICAL & SURGICAL HISTORY:  Myocardial infarction      PFO (patent foramen ovale)      Transient ischemic attack (TIA)      History of colitis      Disc disease with myelopathy, cervical      Radiculopathy, cervical      Obstructive sleep apnea on CPAP      HTN (hypertension)      Diabetes type 2      GERD (gastroesophageal reflux disease)      Alzheimer disease      Rectus diastasis      H/O cardiac catheterization      History of surgical closure of patent foramen ovale (PFO)      S/P lumbar laminectomy      History of cholecystectomy          Allergies: No Known Allergies                                13.5   8.92  )-----------( 225      ( 17 Apr 2025 15:32 )             41.8     04-17    138  |  100  |  13.5  ----------------------------<  129[H]  4.2   |  26.0  |  0.90    Ca    9.6      17 Apr 2025 15:32    TPro  6.6  /  Alb  3.8  /  TBili  0.2[L]  /  DBili  x   /  AST  19  /  ALT  22  /  AlkPhos  66  04-17      PHYSICAL EXAM:    Vital Signs Last 24 Hrs  T(C): 36.7 (18 Apr 2025 04:00), Max: 36.8 (17 Apr 2025 20:00)  T(F): 98 (18 Apr 2025 04:00), Max: 98.2 (17 Apr 2025 20:00)  HR: 69 (18 Apr 2025 04:00) (69 - 95)  BP: 156/75 (18 Apr 2025 04:00) (134/76 - 168/91)  BP(mean): --  RR: 18 (18 Apr 2025 04:00) (17 - 20)  SpO2: 91% (18 Apr 2025 04:00) (91% - 98%)    Parameters below as of 18 Apr 2025 04:00  Patient On (Oxygen Delivery Method): room air      Daily Height in cm: 177.8 (17 Apr 2025 13:05)    Daily     Appearance: Alert, responsive, in no acute distress.      Skin: no rash on visible skin. Skin is clean, dry and intact. No bleeding. No abrasions. No ulcerations.   cervical spine dressing remains intact, some drainage present. Removed and changed today.              Sensation:          Upper extremity            light touch sensation intact C5-T1 bilaterally          Lower extremity             light touch sensation intact T12-S1 bilaterally            Motor exam:          Lower extremity                          IS        Quad   TA          EHL        GS                                               R        5/5        5/5       5/5       5/5         5/5                                               L         5/5        5/5       5/5       5/5          5/5                     Culture Results:   Rare Enterobacter cloacae complex  See previous culture [id number] for susceptibility (04.14.25 @ 17:57)  Specimen Source: Surgical Swab cervical deep (04.14.25 @ 17:57)        A/P:  Pt is a  69y Male status post ACDF C5-C6, C6-C7 POD 12 on 4/2, S/P revision ACDF, repair of incidental durotomy, I and D of seroma 4/14.       Plan:   - continue ABX per ID  - will remain NPO, plan is for patient to go for CT chest with PO contrast to r/o esophageal perforation - need for EGD will be determined based off imaging   - follow -up CT chest

## 2025-04-19 LAB
CULTURE RESULTS: SIGNIFICANT CHANGE UP
GLUCOSE BLDC GLUCOMTR-MCNC: 118 MG/DL — HIGH (ref 70–99)
GLUCOSE BLDC GLUCOMTR-MCNC: 127 MG/DL — HIGH (ref 70–99)
GLUCOSE BLDC GLUCOMTR-MCNC: 128 MG/DL — HIGH (ref 70–99)
GLUCOSE BLDC GLUCOMTR-MCNC: 133 MG/DL — HIGH (ref 70–99)
SPECIMEN SOURCE: SIGNIFICANT CHANGE UP

## 2025-04-19 PROCEDURE — 74018 RADEX ABDOMEN 1 VIEW: CPT | Mod: 26

## 2025-04-19 PROCEDURE — 99233 SBSQ HOSP IP/OBS HIGH 50: CPT

## 2025-04-19 RX ADMIN — OXYBUTYNIN CHLORIDE 10 MILLIGRAM(S): 5 TABLET, FILM COATED, EXTENDED RELEASE ORAL at 18:06

## 2025-04-19 RX ADMIN — MEMANTINE HYDROCHLORIDE 10 MILLIGRAM(S): 21 CAPSULE, EXTENDED RELEASE ORAL at 18:06

## 2025-04-19 RX ADMIN — Medication 975 MILLIGRAM(S): at 13:30

## 2025-04-19 RX ADMIN — Medication 975 MILLIGRAM(S): at 22:18

## 2025-04-19 RX ADMIN — GABAPENTIN 300 MILLIGRAM(S): 400 CAPSULE ORAL at 05:19

## 2025-04-19 RX ADMIN — GABAPENTIN 300 MILLIGRAM(S): 400 CAPSULE ORAL at 18:06

## 2025-04-19 RX ADMIN — MEMANTINE HYDROCHLORIDE 10 MILLIGRAM(S): 21 CAPSULE, EXTENDED RELEASE ORAL at 05:20

## 2025-04-19 RX ADMIN — MEROPENEM 1000 MILLIGRAM(S): 1 INJECTION INTRAVENOUS at 05:21

## 2025-04-19 RX ADMIN — MEROPENEM 1000 MILLIGRAM(S): 1 INJECTION INTRAVENOUS at 21:18

## 2025-04-19 RX ADMIN — Medication 975 MILLIGRAM(S): at 21:18

## 2025-04-19 RX ADMIN — OXYBUTYNIN CHLORIDE 10 MILLIGRAM(S): 5 TABLET, FILM COATED, EXTENDED RELEASE ORAL at 05:20

## 2025-04-19 RX ADMIN — TAMSULOSIN HYDROCHLORIDE 0.4 MILLIGRAM(S): 0.4 CAPSULE ORAL at 21:18

## 2025-04-19 RX ADMIN — ROSUVASTATIN CALCIUM 40 MILLIGRAM(S): 20 TABLET, FILM COATED ORAL at 21:18

## 2025-04-19 RX ADMIN — CARVEDILOL 6.25 MILLIGRAM(S): 3.12 TABLET, FILM COATED ORAL at 05:21

## 2025-04-19 RX ADMIN — Medication 975 MILLIGRAM(S): at 06:20

## 2025-04-19 RX ADMIN — EZETIMIBE 10 MILLIGRAM(S): 10 TABLET ORAL at 12:15

## 2025-04-19 RX ADMIN — MEROPENEM 1000 MILLIGRAM(S): 1 INJECTION INTRAVENOUS at 13:30

## 2025-04-19 RX ADMIN — Medication 40 MILLIGRAM(S): at 05:21

## 2025-04-19 RX ADMIN — Medication 81 MILLIGRAM(S): at 12:15

## 2025-04-19 RX ADMIN — LOSARTAN POTASSIUM 50 MILLIGRAM(S): 100 TABLET, FILM COATED ORAL at 05:20

## 2025-04-19 RX ADMIN — CARVEDILOL 6.25 MILLIGRAM(S): 3.12 TABLET, FILM COATED ORAL at 18:06

## 2025-04-19 RX ADMIN — Medication 975 MILLIGRAM(S): at 14:00

## 2025-04-19 RX ADMIN — Medication 1 DROP(S): at 12:14

## 2025-04-19 RX ADMIN — Medication 975 MILLIGRAM(S): at 05:20

## 2025-04-19 RX ADMIN — CITALOPRAM 10 MILLIGRAM(S): 20 TABLET ORAL at 12:15

## 2025-04-19 NOTE — PROGRESS NOTE ADULT - SUBJECTIVE AND OBJECTIVE BOX
Hospitalist Progress Note    Chief Complaint:  Neck Drainage    SUBJECTIVE / OVERNIGHT EVENTS:  No events overnight, patient seen at bedside, in NAD, complaining of loose stools and lower abd discomfort. Patient denies chest pain, SOB, N/V, fever, chills, dysuria or any other complaints. All remainder ROS negative.     MEDICATIONS  (STANDING):  acetaminophen     Tablet .. 975 milliGRAM(s) Oral every 8 hours  artificial  tears Solution 1 Drop(s) Both EYES daily  aspirin enteric coated 81 milliGRAM(s) Oral daily  carvedilol 6.25 milliGRAM(s) Oral every 12 hours  citalopram 10 milliGRAM(s) Oral daily  dextrose 5%. 1000 milliLiter(s) (50 mL/Hr) IV Continuous <Continuous>  dextrose 5%. 1000 milliLiter(s) (100 mL/Hr) IV Continuous <Continuous>  dextrose 50% Injectable 25 Gram(s) IV Push once  dextrose 50% Injectable 12.5 Gram(s) IV Push once  dextrose 50% Injectable 25 Gram(s) IV Push once  ezetimibe 10 milliGRAM(s) Oral daily  gabapentin 300 milliGRAM(s) Oral two times a day  glucagon  Injectable 1 milliGRAM(s) IntraMuscular once  insulin lispro (ADMELOG) corrective regimen sliding scale   SubCutaneous three times a day before meals  insulin lispro (ADMELOG) corrective regimen sliding scale   SubCutaneous at bedtime  lactated ringers. 1000 milliLiter(s) (75 mL/Hr) IV Continuous <Continuous>  losartan 50 milliGRAM(s) Oral daily  memantine 10 milliGRAM(s) Oral two times a day  meropenem Injectable 1000 milliGRAM(s) IV Push every 8 hours  oxybutynin 10 milliGRAM(s) Oral two times a day  pantoprazole    Tablet 40 milliGRAM(s) Oral before breakfast  rosuvastatin 40 milliGRAM(s) Oral at bedtime  tamsulosin 0.4 milliGRAM(s) Oral at bedtime    MEDICATIONS  (PRN):  dextrose Oral Gel 15 Gram(s) Oral once PRN Blood Glucose LESS THAN 70 milliGRAM(s)/deciliter        I&O's Summary    18 Apr 2025 07:01  -  19 Apr 2025 07:00  --------------------------------------------------------  IN: 60 mL / OUT: 1125 mL / NET: -1065 mL        PHYSICAL EXAM:  Vital Signs Last 24 Hrs  T(C): 37 (19 Apr 2025 08:30), Max: 37 (19 Apr 2025 08:30)  T(F): 98.6 (19 Apr 2025 08:30), Max: 98.6 (19 Apr 2025 08:30)  HR: 82 (19 Apr 2025 08:30) (67 - 82)  BP: 151/79 (19 Apr 2025 08:30) (143/77 - 156/72)  BP(mean): --  RR: 18 (19 Apr 2025 08:30) (17 - 18)  SpO2: 95% (19 Apr 2025 08:30) (92% - 95%)    Parameters below as of 19 Apr 2025 08:30  Patient On (Oxygen Delivery Method): room air        GENERAL: NAD  HEAD:  Atraumatic, Normocephalic  NECK: Supple, No JVD, Normal thyroid; anterior neck with clean dressing  NERVOUS SYSTEM:  Alert & Oriented X3, Good concentration; Motor Strength 5/5 B/L upper and lower extremities  CHEST/LUNG: Clear to auscultation bilaterally  HEART: Regular rate and rhythm; No murmurs, rubs, or gallops  ABDOMEN: Soft, mild lower abd tenderness, Nondistended; Bowel sounds present  EXTREMITIES:  2+ Peripheral Pulses  SKIN: No rashes or lesions    LABS:                        13.5   8.92  )-----------( 225      ( 17 Apr 2025 15:32 )             41.8     04-17    138  |  100  |  13.5  ----------------------------<  129[H]  4.2   |  26.0  |  0.90    Ca    9.6      17 Apr 2025 15:32    TPro  6.6  /  Alb  3.8  /  TBili  0.2[L]  /  DBili  x   /  AST  19  /  ALT  22  /  AlkPhos  66  04-17    PT/INR - ( 17 Apr 2025 15:32 )   PT: 11.9 sec;   INR: 1.03 ratio         PTT - ( 17 Apr 2025 15:32 )  PTT:32.4 sec      Urinalysis Basic - ( 17 Apr 2025 15:32 )    Color: x / Appearance: x / SG: x / pH: x  Gluc: 129 mg/dL / Ketone: x  / Bili: x / Urobili: x   Blood: x / Protein: x / Nitrite: x   Leuk Esterase: x / RBC: x / WBC x   Sq Epi: x / Non Sq Epi: x / Bacteria: x        CAPILLARY BLOOD GLUCOSE      POCT Blood Glucose.: 127 mg/dL (19 Apr 2025 08:00)  POCT Blood Glucose.: 133 mg/dL (18 Apr 2025 21:12)  POCT Blood Glucose.: 120 mg/dL (18 Apr 2025 16:38)  POCT Blood Glucose.: 99 mg/dL (18 Apr 2025 12:15)        RADIOLOGY & ADDITIONAL TESTS:  Results Reviewed: Y  Imaging Personally Reviewed: N  Electrocardiogram Personally Reviewed: ORTEGA

## 2025-04-19 NOTE — PROGRESS NOTE ADULT - ASSESSMENT
70 y/o male with Hx significant for MI, s/p PCI x 11, TIA, PFO s/p PFO closure, HTN, HLD, DM2, colitis, alzheimer's, and MILLICENT on CPAP, he was previous diagnosed in 2023 with cervical myelopathy and lumbar disc disease at which time he underwent a lumbar laminectomy at [facility], He states he noted an improvement in his neck pain after surgery but in the past couple of months has noted a progressive worsening in pain, he was having neck pain, he came in here for elective anterior cervical discectomy and fusion C5-C6, C5-C7 with Dr. Spencer on 4/2/25 s/p surgery, he was discharged after surgery, comes back with draining wound on his neck.   Patient underwent revision of anterior cervical discectomy with fusion (ACDF) on 4/14/2025 with Dr. Bass now presenting to the emergency department with drainage at incision site. Patient was instructed to return to the ED after OR culture found growing Enterobacter Cloacae. Patient reported difficulty swallowing initially but admits is now improved. Patient was discharged on bactrim and has been continuing as prescribed. Medicine consult requested for co-management.    Cervical Myelopathy with Cervical surgical site infection with concern for esophageal tear  S/p ACDF C5-C7 4/2/25.  S/p Revision ACDF C5-7, durotomy repair 4/14/25  OR cultures grew Enterobacter cloacae.  Blood cultures from 4/14/25 negative.  ID following.  Continue Meropenem, likely transition to Invanz on discharge for 6 weeks.  PICC line ordered.  GI consulted. CT chest reviewed, no plan for inpatient EGD, f/u outpatient for one    Loose stools and lower abd discomfort  2 days of complaints  X ray abd  monitor for worsening     CAD  Continue ASA and statin.    TIA  Continue ASA and statin.    HTN  Continue Coreg and Losartan.    HLD  Continue Zetia and statin.    DM2  Would check accuchecks qAC and qHS with sliding scale coverage.  A1c 6.9.  ADA diet when diet advanced.  Hold Jardiance and Mounjaro while in hospital, resume on discharge.    Alzheimer's   Continue Namenda    MILLICENT  CPAP at night.  Nocturnal .    DVT prophylaxis  SCDs.    Thank you for allowing me to assist in this patient's care  Will continue to follow

## 2025-04-19 NOTE — PROGRESS NOTE ADULT - SUBJECTIVE AND OBJECTIVE BOX
[id number]  JAYME DE JESUS    Patient seen and eval at bedside. Patient has no complaints. Patient denies incontinence, saddle anesthesia, difficulty breathing, swallowing. Denies CP, SOB, dizziness. Denies motor/sensory changes.    T(C): 37 (04-19-25 @ 08:30), Max: 37 (04-19-25 @ 08:30)  HR: 82 (04-19-25 @ 08:30) (67 - 82)  BP: 151/79 (04-19-25 @ 08:30) (143/77 - 156/72)  RR: 18 (04-19-25 @ 08:30) (17 - 18)  SpO2: 95% (04-19-25 @ 08:30) (92% - 95%)    PE: NAD, alert awake  Neck: Dressing with mod serosanguinous ? purulent drainage     Motor exam:       Upper extremity                              Delt        Bic         Tric       WF      WE                                               R         5/5        5/5        5/5       5/5       5/5                                               L          5/5        5/5        5/5       5/5      5/5           Lower extremeity                             HF         KE          TA       EHL         GS                                                 R        5/5        5/5        5/5       5/5         5/5                                               L         5/5        5/5       5/5       5/5          5/5    SILT C5-T1 B/L, L2-S1 intact B/L, DP pulses 2+ B/L  Calf soft, NT B/L    Culture - Wound Aerobic/Anaerobic (04.14.25 @ 17:57)    Specimen Source: Surgical Swab cervical deep   Culture Results:   Rare Enterobacter cloacae complex  See previous culture [id number] for susceptibility    Culture - Wound Aerobic/Anaerobic (04.14.25 @ 17:56)    -  Trimethoprim/Sulfamethoxazole: S <=0.5/9.5   -  Levofloxacin: S <=0.5   -  Meropenem: S <=1   -  Piperacillin/Tazobactam: S <=8 Treatment with Pipercillin/Tazobactam is not recommended in severe infections casued by Klebsiella aerogenes, Enterobacter cloacae complex, and Citrobacter freundii complex.   -  Tobramycin: S <=2   -  Amoxicillin/Clavulanic Acid: R >16/8   -  Ampicillin: R 16 These ampicillin results predict results for amoxicillin   -  Ampicillin/Sulbactam: R 8/4   -  Aztreonam: S <=4   -  Cefazolin: R >16   -  Cefepime: S <=2   -  Cefoxitin: R >16   -  Ceftriaxone: S <=1 Enterobacter cloacae, Klebsiella aerogenes, and Citrobacter freundii may develop resistance during prolonged therapy.   -  Ciprofloxacin: S <=0.25   -  Ertapenem: S <=0.5   -  Gentamicin: S <=2   -  Imipenem: S <=1   Specimen Source: Surgical Swab cervical superficial #2   Culture Results:   Rare Enterobacter cloacae complex   Organism Identification: Enterobacter cloacae complex   Organism: Enterobacter cloacae complex   Method Type: LUCIANA    Culture - Wound Aerobic/Anaerobic (04.14.25 @ 17:55)    -  Aztreonam: S <=4   -  Cefazolin: R >16   -  Ampicillin: R 16 These ampicillin results predict results for amoxicillin   -  Ampicillin/Sulbactam: R 8/4   -  Imipenem: S <=1   -  Levofloxacin: S <=0.5   -  Ertapenem: S <=0.5   -  Gentamicin: S <=2   -  Ceftriaxone: S <=1 Enterobacter cloacae, Klebsiella aerogenes, and Citrobacter freundii may develop resistance during prolonged therapy.   -  Ciprofloxacin: S <=0.25   -  Cefepime: S <=2   -  Cefoxitin: R >16   -  Amoxicillin/Clavulanic Acid: R >16/8   -  Tobramycin: S <=2   -  Trimethoprim/Sulfamethoxazole: S <=0.5/9.5   -  Meropenem: S <=1   -  Piperacillin/Tazobactam: S <=8 Treatment with Pipercillin/Tazobactam is not recommended in severe infections casued by Klebsiella aerogenes, Enterobacter cloacae complex, and Citrobacter freundii complex.   Specimen Source: Surgical Swab cervical superficial #1   Culture Results:   Rare Enterobacter cloacae complex   Organism Identification: Enterobacter cloacae complex   Organism: Enterobacter cloacae complex   Method Type: LUCIANA      A/P: s/p Cervical I&D, ENRIQUE, duraseal, revision ACDF POD#5, +Entero cloacae  ·	Pain control  ·	DVT propx:ASA  ·	PT - WBAT  ·	Abx as per ID  ·	PICC line pending - to be placed 4/21  ·	Gastroenterology - recs appreciated  ·	Speech and Swallow consult pending  ·	Bowel regimen  ·	Med following

## 2025-04-20 LAB
ANION GAP SERPL CALC-SCNC: 14 MMOL/L — SIGNIFICANT CHANGE UP (ref 5–17)
BUN SERPL-MCNC: 12.2 MG/DL — SIGNIFICANT CHANGE UP (ref 8–20)
CALCIUM SERPL-MCNC: 8.7 MG/DL — SIGNIFICANT CHANGE UP (ref 8.4–10.5)
CHLORIDE SERPL-SCNC: 100 MMOL/L — SIGNIFICANT CHANGE UP (ref 96–108)
CO2 SERPL-SCNC: 21 MMOL/L — LOW (ref 22–29)
CREAT SERPL-MCNC: 0.75 MG/DL — SIGNIFICANT CHANGE UP (ref 0.5–1.3)
CULTURE RESULTS: ABNORMAL
EGFR: 97 ML/MIN/1.73M2 — SIGNIFICANT CHANGE UP
EGFR: 97 ML/MIN/1.73M2 — SIGNIFICANT CHANGE UP
GLUCOSE BLDC GLUCOMTR-MCNC: 119 MG/DL — HIGH (ref 70–99)
GLUCOSE BLDC GLUCOMTR-MCNC: 124 MG/DL — HIGH (ref 70–99)
GLUCOSE BLDC GLUCOMTR-MCNC: 133 MG/DL — HIGH (ref 70–99)
GLUCOSE BLDC GLUCOMTR-MCNC: 141 MG/DL — HIGH (ref 70–99)
GLUCOSE SERPL-MCNC: 181 MG/DL — HIGH (ref 70–99)
HCT VFR BLD CALC: 45.8 % — SIGNIFICANT CHANGE UP (ref 39–50)
HGB BLD-MCNC: 14.9 G/DL — SIGNIFICANT CHANGE UP (ref 13–17)
MCHC RBC-ENTMCNC: 28.1 PG — SIGNIFICANT CHANGE UP (ref 27–34)
MCHC RBC-ENTMCNC: 32.5 G/DL — SIGNIFICANT CHANGE UP (ref 32–36)
MCV RBC AUTO: 86.4 FL — SIGNIFICANT CHANGE UP (ref 80–100)
NRBC # BLD AUTO: 0 K/UL — SIGNIFICANT CHANGE UP (ref 0–0)
NRBC # FLD: 0 K/UL — SIGNIFICANT CHANGE UP (ref 0–0)
NRBC BLD AUTO-RTO: 0 /100 WBCS — SIGNIFICANT CHANGE UP (ref 0–0)
ORGANISM # SPEC MICROSCOPIC CNT: ABNORMAL
ORGANISM # SPEC MICROSCOPIC CNT: ABNORMAL
ORGANISM # SPEC MICROSCOPIC CNT: SIGNIFICANT CHANGE UP
ORGANISM # SPEC MICROSCOPIC CNT: SIGNIFICANT CHANGE UP
PLATELET # BLD AUTO: 273 K/UL — SIGNIFICANT CHANGE UP (ref 150–400)
PMV BLD: 8.6 FL — SIGNIFICANT CHANGE UP (ref 7–13)
POTASSIUM SERPL-MCNC: 4.1 MMOL/L — SIGNIFICANT CHANGE UP (ref 3.5–5.3)
POTASSIUM SERPL-SCNC: 4.1 MMOL/L — SIGNIFICANT CHANGE UP (ref 3.5–5.3)
RBC # BLD: 5.3 M/UL — SIGNIFICANT CHANGE UP (ref 4.2–5.8)
RBC # FLD: 14.3 % — SIGNIFICANT CHANGE UP (ref 10.3–14.5)
SODIUM SERPL-SCNC: 135 MMOL/L — SIGNIFICANT CHANGE UP (ref 135–145)
SPECIMEN SOURCE: SIGNIFICANT CHANGE UP
WBC # BLD: 9.52 K/UL — SIGNIFICANT CHANGE UP (ref 3.8–10.5)
WBC # FLD AUTO: 9.52 K/UL — SIGNIFICANT CHANGE UP (ref 3.8–10.5)

## 2025-04-20 PROCEDURE — 99232 SBSQ HOSP IP/OBS MODERATE 35: CPT

## 2025-04-20 RX ADMIN — Medication 975 MILLIGRAM(S): at 06:03

## 2025-04-20 RX ADMIN — GABAPENTIN 300 MILLIGRAM(S): 400 CAPSULE ORAL at 05:09

## 2025-04-20 RX ADMIN — GABAPENTIN 300 MILLIGRAM(S): 400 CAPSULE ORAL at 17:13

## 2025-04-20 RX ADMIN — Medication 975 MILLIGRAM(S): at 21:20

## 2025-04-20 RX ADMIN — CARVEDILOL 6.25 MILLIGRAM(S): 3.12 TABLET, FILM COATED ORAL at 05:10

## 2025-04-20 RX ADMIN — MEROPENEM 1000 MILLIGRAM(S): 1 INJECTION INTRAVENOUS at 05:09

## 2025-04-20 RX ADMIN — Medication 40 MILLIGRAM(S): at 05:10

## 2025-04-20 RX ADMIN — Medication 975 MILLIGRAM(S): at 22:20

## 2025-04-20 RX ADMIN — CARVEDILOL 6.25 MILLIGRAM(S): 3.12 TABLET, FILM COATED ORAL at 17:13

## 2025-04-20 RX ADMIN — ROSUVASTATIN CALCIUM 40 MILLIGRAM(S): 20 TABLET, FILM COATED ORAL at 21:20

## 2025-04-20 RX ADMIN — MEROPENEM 1000 MILLIGRAM(S): 1 INJECTION INTRAVENOUS at 21:20

## 2025-04-20 RX ADMIN — LOSARTAN POTASSIUM 50 MILLIGRAM(S): 100 TABLET, FILM COATED ORAL at 05:10

## 2025-04-20 RX ADMIN — CITALOPRAM 10 MILLIGRAM(S): 20 TABLET ORAL at 12:08

## 2025-04-20 RX ADMIN — Medication 975 MILLIGRAM(S): at 05:09

## 2025-04-20 RX ADMIN — TAMSULOSIN HYDROCHLORIDE 0.4 MILLIGRAM(S): 0.4 CAPSULE ORAL at 21:20

## 2025-04-20 RX ADMIN — Medication 975 MILLIGRAM(S): at 14:55

## 2025-04-20 RX ADMIN — MEMANTINE HYDROCHLORIDE 10 MILLIGRAM(S): 21 CAPSULE, EXTENDED RELEASE ORAL at 17:13

## 2025-04-20 RX ADMIN — Medication 975 MILLIGRAM(S): at 14:25

## 2025-04-20 RX ADMIN — Medication 1 DROP(S): at 12:08

## 2025-04-20 RX ADMIN — OXYBUTYNIN CHLORIDE 10 MILLIGRAM(S): 5 TABLET, FILM COATED, EXTENDED RELEASE ORAL at 17:13

## 2025-04-20 RX ADMIN — OXYBUTYNIN CHLORIDE 10 MILLIGRAM(S): 5 TABLET, FILM COATED, EXTENDED RELEASE ORAL at 05:10

## 2025-04-20 RX ADMIN — EZETIMIBE 10 MILLIGRAM(S): 10 TABLET ORAL at 12:08

## 2025-04-20 RX ADMIN — MEMANTINE HYDROCHLORIDE 10 MILLIGRAM(S): 21 CAPSULE, EXTENDED RELEASE ORAL at 05:09

## 2025-04-20 RX ADMIN — Medication 81 MILLIGRAM(S): at 12:08

## 2025-04-20 RX ADMIN — MEROPENEM 1000 MILLIGRAM(S): 1 INJECTION INTRAVENOUS at 14:25

## 2025-04-20 NOTE — PROGRESS NOTE ADULT - SUBJECTIVE AND OBJECTIVE BOX
Hospitalist Progress Note    Chief Complaint:  Neck Drainage    SUBJECTIVE / OVERNIGHT EVENTS:  No events overnight, patient seen at bedside, in NAD, no new complaints at this time. Patient denies chest pain, SOB, N/V, fever, chills, dysuria or any other complaints. All remainder ROS negative.     MEDICATIONS  (STANDING):  acetaminophen     Tablet .. 975 milliGRAM(s) Oral every 8 hours  artificial  tears Solution 1 Drop(s) Both EYES daily  aspirin enteric coated 81 milliGRAM(s) Oral daily  carvedilol 6.25 milliGRAM(s) Oral every 12 hours  citalopram 10 milliGRAM(s) Oral daily  dextrose 5%. 1000 milliLiter(s) (50 mL/Hr) IV Continuous <Continuous>  dextrose 5%. 1000 milliLiter(s) (100 mL/Hr) IV Continuous <Continuous>  dextrose 50% Injectable 25 Gram(s) IV Push once  dextrose 50% Injectable 25 Gram(s) IV Push once  dextrose 50% Injectable 12.5 Gram(s) IV Push once  ezetimibe 10 milliGRAM(s) Oral daily  gabapentin 300 milliGRAM(s) Oral two times a day  glucagon  Injectable 1 milliGRAM(s) IntraMuscular once  insulin lispro (ADMELOG) corrective regimen sliding scale   SubCutaneous three times a day before meals  insulin lispro (ADMELOG) corrective regimen sliding scale   SubCutaneous at bedtime  lactated ringers. 1000 milliLiter(s) (75 mL/Hr) IV Continuous <Continuous>  losartan 50 milliGRAM(s) Oral daily  memantine 10 milliGRAM(s) Oral two times a day  meropenem Injectable 1000 milliGRAM(s) IV Push every 8 hours  oxybutynin 10 milliGRAM(s) Oral two times a day  pantoprazole    Tablet 40 milliGRAM(s) Oral before breakfast  rosuvastatin 40 milliGRAM(s) Oral at bedtime  tamsulosin 0.4 milliGRAM(s) Oral at bedtime    MEDICATIONS  (PRN):  dextrose Oral Gel 15 Gram(s) Oral once PRN Blood Glucose LESS THAN 70 milliGRAM(s)/deciliter        I&O's Summary    19 Apr 2025 07:01  -  20 Apr 2025 07:00  --------------------------------------------------------  IN: 350 mL / OUT: 850 mL / NET: -500 mL        PHYSICAL EXAM:  Vital Signs Last 24 Hrs  T(C): 36.7 (20 Apr 2025 05:00), Max: 37 (19 Apr 2025 08:30)  T(F): 98.1 (20 Apr 2025 05:00), Max: 98.6 (19 Apr 2025 08:30)  HR: 79 (20 Apr 2025 05:00) (79 - 90)  BP: 143/59 (20 Apr 2025 05:00) (128/81 - 154/82)  BP(mean): --  RR: 18 (20 Apr 2025 05:00) (17 - 18)  SpO2: 92% (20 Apr 2025 05:00) (91% - 95%)    Parameters below as of 20 Apr 2025 05:00  Patient On (Oxygen Delivery Method): room air          GENERAL: NAD  HEAD:  Atraumatic, Normocephalic  NECK: Supple, No JVD, Normal thyroid; anterior neck with clean dressing  NERVOUS SYSTEM:  Alert & Oriented X3, Good concentration; Motor Strength 5/5 B/L upper and lower extremities  CHEST/LUNG: Clear to auscultation bilaterally  HEART: Regular rate and rhythm; No murmurs, rubs, or gallops  ABDOMEN: Soft, nontender, Nondistended; Bowel sounds present  EXTREMITIES:  2+ Peripheral Pulses  SKIN: No rashes or lesions    LABS:                    CAPILLARY BLOOD GLUCOSE      POCT Blood Glucose.: 119 mg/dL (20 Apr 2025 07:56)  POCT Blood Glucose.: 118 mg/dL (19 Apr 2025 21:17)  POCT Blood Glucose.: 133 mg/dL (19 Apr 2025 16:47)  POCT Blood Glucose.: 128 mg/dL (19 Apr 2025 12:12)        RADIOLOGY & ADDITIONAL TESTS:  Results Reviewed: Y  Imaging Personally Reviewed: N  Electrocardiogram Personally Reviewed: ORTEGA

## 2025-04-20 NOTE — PROGRESS NOTE ADULT - SUBJECTIVE AND OBJECTIVE BOX
[id number]  JAYME DE JESUS    Patient seen and eval at bedside. Patient has no complaints. Patient denies incontinence, saddle anesthesia, difficulty breathing, swallowing. Denies CP, SOB, dizziness. Denies motor/sensory changes. Patient states loose stools is resolved and are back to his normal BM x3 this morning.    T(C): 36.7 (04-20-25 @ 05:00), Max: 36.9 (04-19-25 @ 21:07)  HR: 79 (04-20-25 @ 05:00) (79 - 90)  BP: 143/59 (04-20-25 @ 05:00) (128/81 - 154/82)  RR: 18 (04-20-25 @ 05:00) (17 - 18)  SpO2: 92% (04-20-25 @ 05:00) (91% - 95%)    PE: NAD, alert awake  Back: Dressing with reddish brown drainage through bulky dressing.   Motor exam:       Upper extremity                               Delt       Bic         Tric       WF      WE                                               R         5/5        5/5        5/5       5/5       5/5                                               L          5/5        5/5        5/5       5/5      5/5           Lower extremeity                             HF         KE          TA       EHL         GS                                                 R        5/5        5/5        5/5       5/5         5/5                                               L         5/5        5/5       5/5       5/5          5/5    SILT C5-T1 B/L, L2-S1 intact B/L, DP pulses 2+ B/L  Calf soft, NT B/L    New dressing placed over anterior neck incision after cleaning with betadine swab. Incision without active drainage or bleeding. No erythema. maceration or blisters.    A/P: s/p Cervical I&D, ENRIQUE, C5-C6, duraseal revision ACDF C5-C7 4/14/25  ·	Med following  ·	Pain control  ·	DVT propx  ·	PT - WBAT  ·	IV abx as per ID  ·	Awaiting S&S eval  ·	PICC line pending  ·	Dispo home poss tomorrow

## 2025-04-20 NOTE — PROGRESS NOTE ADULT - ASSESSMENT
70 y/o male with Hx significant for MI, s/p PCI x 11, TIA, PFO s/p PFO closure, HTN, HLD, DM2, colitis, alzheimer's, and MILLICENT on CPAP, he was previous diagnosed in 2023 with cervical myelopathy and lumbar disc disease at which time he underwent a lumbar laminectomy at [facility], He states he noted an improvement in his neck pain after surgery but in the past couple of months has noted a progressive worsening in pain, he was having neck pain, he came in here for elective anterior cervical discectomy and fusion C5-C6, C5-C7 with Dr. Spencer on 4/2/25 s/p surgery, he was discharged after surgery, comes back with draining wound on his neck.   Patient underwent revision of anterior cervical discectomy with fusion (ACDF) on 4/14/2025 with Dr. Bass now presenting to the emergency department with drainage at incision site. Patient was instructed to return to the ED after OR culture found growing Enterobacter Cloacae. Patient reported difficulty swallowing initially but admits is now improved. Patient was discharged on bactrim and has been continuing as prescribed. Medicine consult requested for co-management.    Cervical Myelopathy with Cervical surgical site infection with concern for esophageal tear  S/p ACDF C5-C7 4/2/25.  S/p Revision ACDF C5-7, durotomy repair 4/14/25  OR cultures grew Enterobacter cloacae.  Blood cultures from 4/14/25 negative.  ID following.  Continue Meropenem, likely transition to Invanz on discharge for 6 weeks.  PICC line ordered.  GI consulted. CT chest reviewed, no plan for inpatient EGD, f/u outpatient for one    Loose stools and lower abd discomfort  Hx of colitis  stable  xray performed awaiting read  recommend low fiber diet     CAD  Continue ASA and statin.    TIA  Continue ASA and statin.    HTN  Continue Coreg and Losartan.    HLD  Continue Zetia and statin.    DM2  Would check accuchecks qAC and qHS with sliding scale coverage.  A1c 6.9.  ADA diet when diet advanced.  Hold Jardiance and Mounjaro while in hospital, resume on discharge.    Alzheimer's   Continue Namenda    MILLICENT  CPAP at night.  Nocturnal .    DVT prophylaxis  SCDs.    Thank you for allowing me to assist in this patient's care  Will continue to follow

## 2025-04-21 ENCOUNTER — TRANSCRIPTION ENCOUNTER (OUTPATIENT)
Age: 70
End: 2025-04-21

## 2025-04-21 VITALS
DIASTOLIC BLOOD PRESSURE: 80 MMHG | OXYGEN SATURATION: 95 % | SYSTOLIC BLOOD PRESSURE: 146 MMHG | TEMPERATURE: 99 F | HEART RATE: 76 BPM | RESPIRATION RATE: 17 BRPM

## 2025-04-21 LAB
GLUCOSE BLDC GLUCOMTR-MCNC: 121 MG/DL — HIGH (ref 70–99)
GLUCOSE BLDC GLUCOMTR-MCNC: 129 MG/DL — HIGH (ref 70–99)

## 2025-04-21 PROCEDURE — 86140 C-REACTIVE PROTEIN: CPT

## 2025-04-21 PROCEDURE — 82962 GLUCOSE BLOOD TEST: CPT

## 2025-04-21 PROCEDURE — 99285 EMERGENCY DEPT VISIT HI MDM: CPT

## 2025-04-21 PROCEDURE — 36569 INSJ PICC 5 YR+ W/O IMAGING: CPT

## 2025-04-21 PROCEDURE — 85027 COMPLETE CBC AUTOMATED: CPT

## 2025-04-21 PROCEDURE — 71045 X-RAY EXAM CHEST 1 VIEW: CPT | Mod: 26

## 2025-04-21 PROCEDURE — 85610 PROTHROMBIN TIME: CPT

## 2025-04-21 PROCEDURE — 74018 RADEX ABDOMEN 1 VIEW: CPT

## 2025-04-21 PROCEDURE — 71250 CT THORAX DX C-: CPT | Mod: MC

## 2025-04-21 PROCEDURE — 85652 RBC SED RATE AUTOMATED: CPT

## 2025-04-21 PROCEDURE — 71045 X-RAY EXAM CHEST 1 VIEW: CPT

## 2025-04-21 PROCEDURE — 85730 THROMBOPLASTIN TIME PARTIAL: CPT

## 2025-04-21 PROCEDURE — 99232 SBSQ HOSP IP/OBS MODERATE 35: CPT

## 2025-04-21 PROCEDURE — G0545: CPT

## 2025-04-21 PROCEDURE — 80048 BASIC METABOLIC PNL TOTAL CA: CPT

## 2025-04-21 PROCEDURE — 99233 SBSQ HOSP IP/OBS HIGH 50: CPT

## 2025-04-21 PROCEDURE — 80053 COMPREHEN METABOLIC PANEL: CPT

## 2025-04-21 PROCEDURE — 76937 US GUIDE VASCULAR ACCESS: CPT | Mod: 26,59

## 2025-04-21 PROCEDURE — 36415 COLL VENOUS BLD VENIPUNCTURE: CPT

## 2025-04-21 RX ORDER — OXYCODONE HYDROCHLORIDE 30 MG/1
1 TABLET ORAL
Qty: 28 | Refills: 0
Start: 2025-04-21

## 2025-04-21 RX ORDER — ASPIRIN 325 MG
1 TABLET ORAL
Qty: 84 | Refills: 0
Start: 2025-04-21

## 2025-04-21 RX ORDER — ERTAPENEM SODIUM 1 G/1
1000 INJECTION, POWDER, LYOPHILIZED, FOR SOLUTION INTRAMUSCULAR; INTRAVENOUS EVERY 24 HOURS
Refills: 0 | Status: DISCONTINUED | OUTPATIENT
Start: 2025-04-21 | End: 2025-04-21

## 2025-04-21 RX ORDER — SENNOSIDES, DOCUSATE SODIUM 8.6; 5 MG/1; MG/1
2 TABLET ORAL
Qty: 30 | Refills: 0
Start: 2025-04-21

## 2025-04-21 RX ADMIN — MEROPENEM 1000 MILLIGRAM(S): 1 INJECTION INTRAVENOUS at 05:04

## 2025-04-21 RX ADMIN — Medication 975 MILLIGRAM(S): at 06:03

## 2025-04-21 RX ADMIN — Medication 975 MILLIGRAM(S): at 13:09

## 2025-04-21 RX ADMIN — Medication 1 DROP(S): at 13:08

## 2025-04-21 RX ADMIN — CITALOPRAM 10 MILLIGRAM(S): 20 TABLET ORAL at 13:08

## 2025-04-21 RX ADMIN — Medication 975 MILLIGRAM(S): at 13:40

## 2025-04-21 RX ADMIN — GABAPENTIN 300 MILLIGRAM(S): 400 CAPSULE ORAL at 05:03

## 2025-04-21 RX ADMIN — Medication 40 MILLIGRAM(S): at 05:03

## 2025-04-21 RX ADMIN — EZETIMIBE 10 MILLIGRAM(S): 10 TABLET ORAL at 13:08

## 2025-04-21 RX ADMIN — OXYBUTYNIN CHLORIDE 10 MILLIGRAM(S): 5 TABLET, FILM COATED, EXTENDED RELEASE ORAL at 05:04

## 2025-04-21 RX ADMIN — CARVEDILOL 6.25 MILLIGRAM(S): 3.12 TABLET, FILM COATED ORAL at 05:05

## 2025-04-21 RX ADMIN — ERTAPENEM SODIUM 100 MILLIGRAM(S): 1 INJECTION, POWDER, LYOPHILIZED, FOR SOLUTION INTRAMUSCULAR; INTRAVENOUS at 13:09

## 2025-04-21 RX ADMIN — Medication 20 MILLIGRAM(S): at 13:08

## 2025-04-21 RX ADMIN — Medication 975 MILLIGRAM(S): at 05:03

## 2025-04-21 RX ADMIN — MEMANTINE HYDROCHLORIDE 10 MILLIGRAM(S): 21 CAPSULE, EXTENDED RELEASE ORAL at 05:03

## 2025-04-21 RX ADMIN — Medication 81 MILLIGRAM(S): at 13:08

## 2025-04-21 NOTE — DISCHARGE NOTE PROVIDER - NSDCCPTREATMENT_GEN_ALL_CORE_FT
PRINCIPAL PROCEDURE  Procedure: Revision of anterior cervical discectomy with fusion (ACDF)  Findings and Treatment:       SECONDARY PROCEDURE  Procedure: Revision of anterior cervical discectomy with fusion (ACDF)  Findings and Treatment:

## 2025-04-21 NOTE — DISCHARGE NOTE NURSING/CASE MANAGEMENT/SOCIAL WORK - PATIENT PORTAL LINK FT
You can access the FollowMyHealth Patient Portal offered by [facility] by registering at the following website: [email or web address]. By joining [employer]’s FollowMyHealth portal, you will also be able to view your health information using other applications (apps) compatible with our system.

## 2025-04-21 NOTE — DISCHARGE NOTE PROVIDER - NSDCFUADDINST_GEN_ALL_CORE_FT
Leave occlusive dressing on wound, if it becomes saturated change the dressing. Protect while showering.  Leave dressing on when showering, remove dressing after shower, dry area and reapply dressing.   You may shower on Post operative day 3.  Leave steri strips intact, they will fall off on their own or be removed on first post op visit. Wear cervical collar when out of bed for comfort, especially when you are passenger in a car , may take off for meals & showering.  Physical therapy will be prescribed on second office visit.  For now, engage in light activity as tolerated, no lifting greater than 5 lb.  No driving while on pain meds and must wear cervical collar when in a vehicle for 28 days.

## 2025-04-21 NOTE — DISCHARGE NOTE PROVIDER - NSDCFUSCHEDAPPT_GEN_ALL_CORE_FT
Tyrell Spencer [facility]  ORTHOSURG [address]  Scheduled Appointment: 04/25/2025    Titus Light  [place] [facility]  ENDOCRIN [id number]  Scheduled Appointment: 05/27/2025

## 2025-04-21 NOTE — PROGRESS NOTE ADULT - SUBJECTIVE AND OBJECTIVE BOX
INFECTIOUS DISEASES AND INTERNAL MEDICINE at [place]  =======================================================  Adriano Veliz MD  Diplomates American Board of Internal Medicine and Infectious Diseases  Telephone [phone]  Fax            [phone]  =======================================================    JAYME DE JESUS [id number]    Follow up:  DISCITIS    Allergies:  No Known Allergies      Medications:  acetaminophen     Tablet .. 975 milliGRAM(s) Oral every 8 hours  artificial  tears Solution 1 Drop(s) Both EYES daily  aspirin enteric coated 81 milliGRAM(s) Oral daily  carvedilol 6.25 milliGRAM(s) Oral every 12 hours  citalopram 10 milliGRAM(s) Oral daily  ezetimibe 10 milliGRAM(s) Oral daily  gabapentin 300 milliGRAM(s) Oral two times a day  lactated ringers. 1000 milliLiter(s) IV Continuous <Continuous>  losartan 50 milliGRAM(s) Oral daily  memantine 10 milliGRAM(s) Oral two times a day  meropenem Injectable 1000 milliGRAM(s) IV Push every 8 hours  oxybutynin 10 milliGRAM(s) Oral two times a day  pantoprazole    Tablet 40 milliGRAM(s) Oral before breakfast  rosuvastatin 40 milliGRAM(s) Oral at bedtime  tamsulosin 0.4 milliGRAM(s) Oral at bedtime    SOCIAL       FAMILY   FAMILY HISTORY:  FH: heart disease (Father)    Family history of CVA (Mother)      REVIEW OF SYSTEMS:  CONSTITUTIONAL:  No Fever or chills  HEENT:   No diplopia or blurred vision.  No earache, sore throat or runny nose.  CARDIOVASCULAR:  No pressure, squeezing, strangling, tightness, heaviness or aching about the chest, neck, axilla or epigastrium.  RESPIRATORY:  No cough, shortness of breath, PND or orthopnea.  GASTROINTESTINAL:  No nausea, vomiting or diarrhea.  GENITOURINARY:  No dysuria, frequency or urgency. No Blood in urine  MUSCULOSKELETAL:   moves all joints  SKIN:  No change in skin, hair or nails.  NEUROLOGIC:  No paresthesias, fasciculations, seizures or weakness.  PSYCHIATRIC:  No disorder of thought or mood.  ENDOCRINE:  No heat or cold intolerance, polyuria or polydipsia.  HEMATOLOGICAL:  No easy bruising or bleeding.            Physical Exam:  I Vital Signs Last 24 Hrs  T(C): 36.6 (21 Apr 2025 05:00), Max: 37 (20 Apr 2025 16:18)  T(F): 97.8 (21 Apr 2025 05:00), Max: 98.6 (20 Apr 2025 16:18)  HR: 80 (21 Apr 2025 05:00) (80 - 90)  BP: 114/75 (21 Apr 2025 05:00) (114/75 - 146/83)  BP(mean): --  RR: 18 (21 Apr 2025 05:00) (17 - 18)  SpO2: 93% (21 Apr 2025 05:00) (91% - 94%)    Parameters below as of 21 Apr 2025 05:00  Patient On (Oxygen Delivery Method): room air              GEN: NAD,   HEENT: normocephalic and atraumatic. EOMI. JUSTICE.    NECK: Supple. No carotid bruits.  No lymphadenopathy or thyromegaly. DRESSING IN PLACE  LUNGS: Clear to auscultation.  HEART: Regular rate and rhythm without murmur.  ABDOMEN: Soft, nontender, and nondistended.  Positive bowel sounds.    : No CVA tenderness  EXTREMITIES: Without any cyanosis, clubbing, rash, lesions or edema.  MSK: no joint swelling  NEUROLOGIC: Cranial nerves II through XII are grossly intact.  PSYCHIATRIC: Appropriate affect .  SKIN: No ulceration or induration present.        Labs:  Vitals:  ============  T(F): 98 (18 Apr 2025 04:00), Max: 98.2 (17 Apr 2025 20:00)  HR: 69 (18 Apr 2025 04:00)  BP: 156/75 (18 Apr 2025 04:00)  RR: 18 (18 Apr 2025 04:00)  SpO2: 91% (18 Apr 2025 04:00) (91% - 98%)  temp max in last 48H T(F): , Max: 98.2 (04-17-25 @ 20:00)    =======================================================  Current Antibiotics:  meropenem Injectable 1000 milliGRAM(s) IV Push every 8 hours    Other medications:  acetaminophen     Tablet .. 975 milliGRAM(s) Oral every 8 hours  artificial  tears Solution 1 Drop(s) Both EYES daily  aspirin enteric coated 81 milliGRAM(s) Oral daily  carvedilol 6.25 milliGRAM(s) Oral every 12 hours  citalopram 10 milliGRAM(s) Oral daily  ezetimibe 10 milliGRAM(s) Oral daily  gabapentin 300 milliGRAM(s) Oral two times a day  lactated ringers. 1000 milliLiter(s) IV Continuous <Continuous>  losartan 50 milliGRAM(s) Oral daily  memantine 10 milliGRAM(s) Oral two times a day  oxybutynin 10 milliGRAM(s) Oral two times a day  pantoprazole    Tablet 40 milliGRAM(s) Oral before breakfast  rosuvastatin 40 milliGRAM(s) Oral at bedtime  tamsulosin 0.4 milliGRAM(s) Oral at bedtime      =======================================================  Labs:                                            14.9   9.52  )-----------( 273      ( 20 Apr 2025 17:40 )             45.8   04-20    135  |  100  |  12.2  ----------------------------<  181[H]  4.1   |  21.0[L]  |  0.75    Ca    8.7      20 Apr 2025 17:40          Culture - Wound Aerobic/Anaerobic (collected 04-14-25 @ 17:57)  Source: Surgical Swab cervical deep  Preliminary Report (04-17-25 @ 08:11):    Rare Enterobacter cloacae complex    See previous culture [id number] for susceptibility    Culture - Wound Aerobic/Anaerobic (collected 04-14-25 @ 17:56)  Source: Surgical Swab cervical superficial #2  Preliminary Report (04-16-25 @ 13:12):    Rare Enterobacter cloacae complex  Organism: Enterobacter cloacae complex (04-17-25 @ 08:12)  Organism: Enterobacter cloacae complex (04-17-25 @ 08:12)    Sensitivities:      -  Levofloxacin: S <=0.5      -  Tobramycin: S <=2      -  Aztreonam: S <=4      -  Gentamicin: S <=2      -  Cefazolin: R >16      -  Cefepime: S <=2      -  Piperacillin/Tazobactam: S <=8 Treatment with Pipercillin/Tazobactam is not recommended in severe infections casued by Klebsiella aerogenes, Enterobacter cloacae complex, and Citrobacter freundii complex.      -  Ciprofloxacin: S <=0.25      -  Imipenem: S <=1      -  Ceftriaxone: S <=1 Enterobacter cloacae, Klebsiella aerogenes, and Citrobacter freundii may develop resistance during prolonged therapy.      -  Ampicillin: R 16 These ampicillin results predict results for amoxicillin      Method Type: LUCIANA      -  Meropenem: S <=1      -  Ampicillin/Sulbactam: R 8/4      -  Cefoxitin: R >16      -  Amoxicillin/Clavulanic Acid: R >16/8      -  Trimethoprim/Sulfamethoxazole: S <=0.5/9.5      -  Ertapenem: S <=0.5    Culture - Wound Aerobic/Anaerobic (collected 04-14-25 @ 17:55)  Source: Surgical Swab cervical superficial #1  Preliminary Report (04-17-25 @ 08:16):    Rare Enterobacter cloacae complex  Organism: Enterobacter cloacae complex (04-17-25 @ 08:15)  Organism: Enterobacter cloacae complex (04-17-25 @ 08:15)    Sensitivities:      -  Levofloxacin: S <=0.5      -  Tobramycin: S <=2      -  Aztreonam: S <=4      -  Gentamicin: S <=2      -  Cefazolin: R >16      -  Cefepime: S <=2      -  Piperacillin/Tazobactam: S <=8 Treatment with Pipercillin/Tazobactam is not recommended in severe infections casued by Klebsiella aerogenes, Enterobacter cloacae complex, and Citrobacter freundii complex.      -  Ciprofloxacin: S <=0.25      -  Imipenem: S <=1      -  Ceftriaxone: S <=1 Enterobacter cloacae, Klebsiella aerogenes, and Citrobacter freundii may develop resistance during prolonged therapy.      -  Ampicillin: R 16 These ampicillin results predict results for amoxicillin      Method Type: LUCIANA      -  Meropenem: S <=1      -  Ampicillin/Sulbactam: R 8/4      -  Cefoxitin: R >16      -  Amoxicillin/Clavulanic Acid: R >16/8      -  Trimethoprim/Sulfamethoxazole: S <=0.5/9.5      -  Ertapenem: S <=0.5    Culture - Blood (collected 04-14-25 @ 05:00)  Source: Blood Blood-Peripheral  Preliminary Report (04-17-25 @ 09:01):    No growth at 72 Hours      Creatinine: 0.90 mg/dL (04-17-25 @ 15:32)  Creatinine: 0.90 mg/dL (04-14-25 @ 05:00)  Creatinine: 0.98 mg/dL (04-13-25 @ 12:29)          C-Reactive Protein: 13 mg/L (04-17-25 @ 15:32)  C-Reactive Protein: 21 mg/L (04-15-25 @ 06:46)    WBC Count: 8.92 K/uL (04-17-25 @ 15:32)  WBC Count: 14.83 K/uL (04-14-25 @ 05:00)  WBC Count: 13.95 K/uL (04-13-25 @ 12:29)        Alkaline Phosphatase: 66 U/L (04-17-25 @ 15:32)  Alanine Aminotransferase (ALT/SGPT): 22 U/L (04-17-25 @ 15:32)  Aspartate Aminotransferase (AST/SGOT): 19 U/L (04-17-25 @ 15:32)  Bilirubin Total: 0.2 mg/dL (04-17-25 @ 15:32)

## 2025-04-21 NOTE — PROGRESS NOTE ADULT - NS ATTEND AMEND GEN_ALL_CORE FT
Orthopaedic Spine/Trauma Addendum:    I have personally reviewed the patients chart, imaging and lab results. I have reviewed the physician assistant note and agree with the history, exam, and plan of care, except as noted.    Drainage has significantly decreased  Infectious markers downtrending  He is stable for DC home, outpatient F/u this week for wound check     Alessio Bass DO  Orthopaedic Spine/Trauma Surgeon  [facility] [place]

## 2025-04-21 NOTE — PROGRESS NOTE ADULT - PROVIDER SPECIALTY LIST ADULT
Infectious Disease
Orthopedics
Infectious Disease
Orthopedics
Orthopedics
Hospitalist

## 2025-04-21 NOTE — DISCHARGE NOTE PROVIDER - CARE PROVIDER_API CALL
Alessio Bass  Spine Surgery  [address] [place] [id number]  Phone: [phone]  Fax: [phone]  Follow Up Time:

## 2025-04-21 NOTE — DISCHARGE NOTE NURSING/CASE MANAGEMENT/SOCIAL WORK - FINANCIAL ASSISTANCE
[facility] provides services at a reduced cost to those who are determined to be eligible through [facility]’s financial assistance program. Information regarding [facility]’s financial assistance program can be found by going to [email or web address] or by calling [phone].

## 2025-04-21 NOTE — PROCEDURE NOTE - NSCHLORHEXIDINEBATH_GEN_A_CORE
2% wipes [Dry Eyes] : dryness of the eyes [Abdominal Pain] : abdominal pain [see HPI] : see HPI [Genital bacterial infection] : genital bacterial infection [Date of last menstrual period ____] : date of last menstrual period: [unfilled] [Seen by urologist before (Name)  ___] : Previously seen by a urologist: [unfilled] [Urine Infection (bladder/kidney)] : bladder/kidney infection [Wake up at night to urinate  How many times?  ___] : wakes up to urinate [unfilled] times during the night [Bladder pressure] : experiences bladder pressure [Joint Pain] : joint pain [Joint Swelling] : joint swelling [Dizziness] : dizziness [Limb Weakness] : limb weakness [Difficulty Walking] : difficulty walking [Muscle Weakness] : muscle weakness [Feelings Of Weakness] : feelings of weakness [Negative] : Heme/Lymph

## 2025-04-21 NOTE — PROGRESS NOTE ADULT - ASSESSMENT
Patient is a 69M s/p anterior cervical discectomy and fusion (ACDF) at 2 levels on 04/02/2025 followed by revision of anterior cervical discectomy with fusion (ACDF) on 4/14/2025 with Dr. Bass now presenting to the emergency department with a with drainage at incision site. Patient was instructed to return to the ED after OR culture found growing Enterobacter Cloacae. Patient reports difficulty swallowing initially but admits is now improved. Incision has remained dry until he showered today. Pt reports dressing fell off and he noticed some discharge--patient applied a new dressing. Patient was discharged on bactrim and has been continuing as prescribed. Admits minor throat discomfort. Denies fever, chills, numbness, paresthesia, or acute motor changes.  AS ABOVE PT NOW WITH POSITIVE ENTEROBACTER CLOACAE IN OPERATIVE CX CALLED BACK  FOR Admission  PT NON TOXIC DENIES FEVERS   PT WITH HARDWARE IN PLACE ALTHOUGH NO GROSS PUS SEE IN OR  WILL PLAN FOR 6 WEEKS IV ABX FOF PRESUMED DISC ISSUE   PT FOR GI EVAL ? EGD    ON MERREM NOW   ORDERED PICC LINE   WILL LIKELY SEND OUT ON INVANZ ONCE  A DAY FOR CONVENIENCE  WILL CHANGE TO INVANZ 1GM Q 24 THROUGH 5/29/25  WEEKLY CBC CMP SED RATE CRP  SPOKE TO WIFE AT BEDSIDE   CT SCAN NO ESOPHAGEAL LEAK   CT SCAN WITH COLLECTION WILL D/W ORTHO ?POST OP  WILL FOLLOW  WITH FURTHER RECOMMENDATIONS

## 2025-04-21 NOTE — PROGRESS NOTE ADULT - SUBJECTIVE AND OBJECTIVE BOX
· Subjective and Objective:   [id number]  JAYME DE JESUS    Patient has no complaints. PICC placed today Patient denies incontinence, saddle anesthesia, difficulty breathing, swallowing. Denies CP, SOB, dizziness. Denies motor/sensory changes.     Vital Signs Last 24 Hrs  T(C): 36.6 (21 Apr 2025 09:29), Max: 37 (20 Apr 2025 16:18)  T(F): 97.9 (21 Apr 2025 09:29), Max: 98.6 (20 Apr 2025 16:18)  HR: 76 (21 Apr 2025 09:29) (76 - 90)  BP: 120/73 (21 Apr 2025 09:29) (114/75 - 146/83)  BP(mean): 89 (21 Apr 2025 09:29) (89 - 89)  RR: 17 (21 Apr 2025 09:29) (17 - 18)  SpO2: 90% (21 Apr 2025 09:29) (90% - 94%)    Parameters below as of 21 Apr 2025 09:29  Patient On (Oxygen Delivery Method): room air        PE: NAD, alert awake, sitting up in bed  Cervical dressing with minimal serous drainage to bandage, no active drainage, no bleeding  new dressing placed    Motor exam:       Upper extremity                               Delt       Bic         Tric       WF      WE                                               R         5/5        5/5        5/5       5/5       5/5                                               L          5/5        5/5        5/5       5/5      5/5           Lower extremeity                             HF         KE          TA       EHL         GS                                                 R        5/5        5/5        5/5       5/5         5/5                                               L         5/5        5/5       5/5       5/5          5/5    SILT C5-T1 B/L, L2-S1 intact B/L, DP pulses 2+ B/L  Calf soft, NT B/L    New dressing placed over anterior neck Incision    A/P: s/p Cervical I&D, ENRIQUE, C5-C6, duraseal revision ACDF C5-C7 4/14/25  Med following  Pain control  DVT propx  PT - WBAT  IV abx as per ID  Dispo home today

## 2025-04-21 NOTE — DISCHARGE NOTE PROVIDER - NSDCMRMEDTOKEN_GEN_ALL_CORE_FT
acetaminophen 325 mg oral tablet: 3 tab(s) orally every 8 hours  Aspirin EC 81 mg oral delayed release tablet: 1 tab(s) orally 2 times a day  carvedilol 6.25 mg oral tablet: 1 tab(s) orally 2 times a day  citalopram 10 mg oral tablet: 1 tab(s) orally once a day  ertapenem 1 g injection: 1 gram(s) intravenously once a day through 5/29 weekly cbc cmp sed rate crp  ezetimibe 10 mg oral tablet: 1 tab(s) orally once a day  gabapentin 300 mg oral tablet: orally 2 times a day  Gemtesa 75 mg oral tablet: 1 tab(s) orally once a day  Jardiance 25 mg oral tablet: 1 tab(s) orally once a day  losartan 50 mg oral tablet: 1 tab(s) orally once a day  memantine 10 mg oral tablet: 1 tab(s) orally 2 times a day  Mounjaro 7.5 mg/0.5 mL subcutaneous solution: 7.5 milligram(s) subcutaneously once a week sundays  oxyCODONE 5 mg oral tablet: 1 tab(s) orally every 6 hours as needed for pain MDD: 4  pantoprazole 40 mg oral delayed release tablet: 1 tab(s) orally once a day (before a meal)  rosuvastatin 40 mg oral tablet: 1 tab(s) orally once a day  Senna S 50 mg-8.6 mg oral tablet: 2 tab(s) orally once a day  Systane preserved ophthalmic solution: 1 drop(s) in each eye 2 times a day  Toviaz 8 mg oral tablet, extended release: 1 tab(s) orally once a day

## 2025-04-21 NOTE — DISCHARGE NOTE PROVIDER - HOSPITAL COURSE
Patient was admitted on 4/14for cervical irrigation and debridement, ENRIQUE, revision of ACDF C5-7.   The patient was given antibiotics per protocol. Infectious disease was following along during the hospital stay. Cultures of wound were obtained intraoperatively. Antibiotics prescribed per ID recommendations.  The patient was treated with Aspirin 81 mg daily for DVTP.  The post operative course was uneventful.  PT/OT Worked with patient for acute rehabilitation process. The pain was adequately controlled and patient is able to go home as he  can accomplish ADL with help from family member at this time. PICC line placed prior to discharge.

## 2025-04-21 NOTE — PROGRESS NOTE ADULT - ASSESSMENT
68 y/o male with Hx significant for MI, s/p PCI x 11, TIA, PFO s/p PFO closure, HTN, HLD, DM2, colitis, alzheimer's, and MILLICENT on CPAP, he was previous diagnosed in 2023 with cervical myelopathy and lumbar disc disease at which time he underwent a lumbar laminectomy at [facility], He states he noted an improvement in his neck pain after surgery but in the past couple of months has noted a progressive worsening in pain, he was having neck pain, he came in here for elective anterior cervical discectomy and fusion C5-C6, C5-C7 with Dr. Spencer on 4/2/25 s/p surgery, he was discharged after surgery, comes back with draining wound on his neck.   Patient underwent revision of anterior cervical discectomy with fusion (ACDF) on 4/14/2025 with Dr. Bass now presenting to the emergency department with drainage at incision site. Patient was instructed to return to the ED after OR culture found growing Enterobacter Cloacae. Patient reported difficulty swallowing initially but admits is now improved. Patient was discharged on bactrim and has been continuing as prescribed. Medicine consult requested for co-management.    Cervical Myelopathy with Cervical surgical site infection with concern for esophageal tear  S/p ACDF C5-C7 4/2/25.  S/p Revision ACDF C5-7, durotomy repair 4/14/25  OR cultures grew Enterobacter cloacae.  Blood cultures from 4/14/25 negative.  ID following, cont ertapenem  THROUGH 5/29/25  PICC line placed  GI consulted. CT chest reviewed, no plan for inpatient EGD, f/u outpatient for one    Loose stools and lower abd discomfort  Hx of colitis  stable  xray performed awaiting read  recommend low fiber diet   had bm    CAD  Continue ASA and statin.    TIA  Continue ASA and statin.    HTN  Continue Coreg and Losartan.    HLD  Continue Zetia and statin.    DM2  Would check accuchecks qAC and qHS with sliding scale coverage.  A1c 6.9.  ADA diet when diet advanced.  Hold Jardiance and Mounjaro while in hospital, resume on discharge.    Alzheimer's   Continue Namenda    MILLICENT  CPAP at night.  Nocturnal .    DVT prophylaxis  SCDs.    OK to dc home once cleared by ID and ortho.

## 2025-04-21 NOTE — PROGRESS NOTE ADULT - SUBJECTIVE AND OBJECTIVE BOX
[facility] Division of Hospital Medicine    Chief Complaint:  Neck Drainage    SUBJECTIVE / OVERNIGHT EVENTS: Patient seen and examined. picc line placed. He is awaiting cxr to confirm placement. Denies chest pain and shortness of breath HAd a BM. Eating and drinking without difficulty. Plan is to go home today.     Patient denies chest pain, SOB, abd pain, N/V, fever, chills, dysuria or any other complaints. All remainder ROS negative.     MEDICATIONS  (STANDING):  acetaminophen     Tablet .. 975 milliGRAM(s) Oral every 8 hours  artificial  tears Solution 1 Drop(s) Both EYES daily  aspirin enteric coated 81 milliGRAM(s) Oral daily  carvedilol 6.25 milliGRAM(s) Oral every 12 hours  citalopram 10 milliGRAM(s) Oral daily  dextrose 5%. 1000 milliLiter(s) (50 mL/Hr) IV Continuous <Continuous>  dextrose 5%. 1000 milliLiter(s) (100 mL/Hr) IV Continuous <Continuous>  dextrose 50% Injectable 25 Gram(s) IV Push once  dextrose 50% Injectable 12.5 Gram(s) IV Push once  dextrose 50% Injectable 25 Gram(s) IV Push once  dicyclomine 20 milliGRAM(s) Oral daily  ertapenem  IVPB 1000 milliGRAM(s) IV Intermittent every 24 hours  ezetimibe 10 milliGRAM(s) Oral daily  gabapentin 300 milliGRAM(s) Oral two times a day  glucagon  Injectable 1 milliGRAM(s) IntraMuscular once  insulin lispro (ADMELOG) corrective regimen sliding scale   SubCutaneous three times a day before meals  insulin lispro (ADMELOG) corrective regimen sliding scale   SubCutaneous at bedtime  lactated ringers. 1000 milliLiter(s) (75 mL/Hr) IV Continuous <Continuous>  losartan 50 milliGRAM(s) Oral daily  memantine 10 milliGRAM(s) Oral two times a day  oxybutynin 10 milliGRAM(s) Oral two times a day  pantoprazole    Tablet 40 milliGRAM(s) Oral before breakfast  rosuvastatin 40 milliGRAM(s) Oral at bedtime  tamsulosin 0.4 milliGRAM(s) Oral at bedtime    MEDICATIONS  (PRN):  dextrose Oral Gel 15 Gram(s) Oral once PRN Blood Glucose LESS THAN 70 milliGRAM(s)/deciliter        I&O's Summary    20 Apr 2025 07:01  -  21 Apr 2025 07:00  --------------------------------------------------------  IN: 350 mL / OUT: 700 mL / NET: -350 mL        PHYSICAL EXAM:  Vital Signs Last 24 Hrs  T(C): 36.6 (21 Apr 2025 09:29), Max: 37 (20 Apr 2025 16:18)  T(F): 97.9 (21 Apr 2025 09:29), Max: 98.6 (20 Apr 2025 16:18)  HR: 76 (21 Apr 2025 09:29) (76 - 90)  BP: 120/73 (21 Apr 2025 09:29) (114/75 - 146/83)  BP(mean): 89 (21 Apr 2025 09:29) (89 - 89)  RR: 17 (21 Apr 2025 09:29) (17 - 18)  SpO2: 90% (21 Apr 2025 09:29) (90% - 94%)    Parameters below as of 21 Apr 2025 09:29  Patient On (Oxygen Delivery Method): room air            CONSTITUTIONAL: NAD  ENMT: Moist oral mucosa, no pharyngeal injection or exudates  RESPIRATORY: Normal respiratory effort; lungs are clear to auscultation bilaterally  CARDIOVASCULAR: Regular rate and rhythm, normal S1 and S2, no murmur/rub/gallop; No lower extremity edema;  ABDOMEN: Nontender to palpation, normoactive bowel sounds, no rebound/guarding;   MUSCLOSKELETAL:  anterior neck dressing c/d/i   PSYCH: A+O to person, place, and time; affect appropriate  NEUROLOGY: CN 2-12 are intact and symmetric  SKIN: No rashes; no palpable lesions    LABS:                        14.9   9.52  )-----------( 273      ( 20 Apr 2025 17:40 )             45.8     04-20    135  |  100  |  12.2  ----------------------------<  181[H]  4.1   |  21.0[L]  |  0.75    Ca    8.7      20 Apr 2025 17:40            Urinalysis Basic - ( 20 Apr 2025 17:40 )    Color: x / Appearance: x / SG: x / pH: x  Gluc: 181 mg/dL / Ketone: x  / Bili: x / Urobili: x   Blood: x / Protein: x / Nitrite: x   Leuk Esterase: x / RBC: x / WBC x   Sq Epi: x / Non Sq Epi: x / Bacteria: x        CAPILLARY BLOOD GLUCOSE      POCT Blood Glucose.: 121 mg/dL (21 Apr 2025 07:41)  POCT Blood Glucose.: 141 mg/dL (20 Apr 2025 21:23)  POCT Blood Glucose.: 133 mg/dL (20 Apr 2025 17:11)  POCT Blood Glucose.: 124 mg/dL (20 Apr 2025 12:07)        RADIOLOGY & ADDITIONAL TESTS:  Results Reviewed:   Imaging Personally Reviewed:  Electrocardiogram Personally Reviewed:

## 2025-04-23 DIAGNOSIS — Z79.2 LONG TERM (CURRENT) USE OF ANTIBIOTICS: ICD-10-CM

## 2025-04-25 ENCOUNTER — APPOINTMENT (OUTPATIENT)
Dept: ORTHOPEDIC SURGERY | Facility: CLINIC | Age: 70
End: 2025-04-25
Payer: MEDICARE

## 2025-04-25 DIAGNOSIS — Z98.1 ARTHRODESIS STATUS: ICD-10-CM

## 2025-04-25 PROCEDURE — 72040 X-RAY EXAM NECK SPINE 2-3 VW: CPT

## 2025-04-25 PROCEDURE — 99024 POSTOP FOLLOW-UP VISIT: CPT

## 2025-04-26 DIAGNOSIS — B37.0 CANDIDAL STOMATITIS: ICD-10-CM

## 2025-04-26 RX ORDER — NYSTATIN 100000 [USP'U]/ML
100000 SUSPENSION ORAL 3 TIMES DAILY
Qty: 1 | Refills: 0 | Status: ACTIVE | COMMUNITY
Start: 2025-04-26 | End: 1900-01-01

## 2025-04-29 ENCOUNTER — APPOINTMENT (OUTPATIENT)
Dept: ORTHOPEDIC SURGERY | Facility: CLINIC | Age: 70
End: 2025-04-29

## 2025-05-03 LAB
ALBUMIN SERPL ELPH-MCNC: 4 G/DL
ALP BLD-CCNC: 85 U/L
ALT SERPL-CCNC: 49 U/L
ANION GAP SERPL CALC-SCNC: 13 MMOL/L
AST SERPL-CCNC: 73 U/L
BASOPHILS # BLD AUTO: 0.06 K/UL
BASOPHILS NFR BLD AUTO: 0.8 %
BILIRUB SERPL-MCNC: 0.2 MG/DL
BUN SERPL-MCNC: 11 MG/DL
CALCIUM SERPL-MCNC: 9.3 MG/DL
CHLORIDE SERPL-SCNC: 100 MMOL/L
CO2 SERPL-SCNC: 23 MMOL/L
CREAT SERPL-MCNC: 1 MG/DL
CRP SERPL-MCNC: 4 MG/L
EGFRCR SERPLBLD CKD-EPI 2021: 81 ML/MIN/1.73M2
EOSINOPHIL # BLD AUTO: 0.38 K/UL
EOSINOPHIL NFR BLD AUTO: 4.9 %
ERYTHROCYTE [SEDIMENTATION RATE] IN BLOOD BY WESTERGREN METHOD: 15 MM/HR
GLUCOSE SERPL-MCNC: 107 MG/DL
HCT VFR BLD CALC: 40.7 %
HGB BLD-MCNC: 12.9 G/DL
IMM GRANULOCYTES NFR BLD AUTO: 0.4 %
LYMPHOCYTES # BLD AUTO: 1.17 K/UL
LYMPHOCYTES NFR BLD AUTO: 15.1 %
MAN DIFF?: NORMAL
MCHC RBC-ENTMCNC: 28.1 PG
MCHC RBC-ENTMCNC: 31.7 G/DL
MCV RBC AUTO: 88.7 FL
MONOCYTES # BLD AUTO: 0.6 K/UL
MONOCYTES NFR BLD AUTO: 7.8 %
NEUTROPHILS # BLD AUTO: 5.5 K/UL
NEUTROPHILS NFR BLD AUTO: 71 %
PLATELET # BLD AUTO: 228 K/UL
POTASSIUM SERPL-SCNC: 5.7 MMOL/L
PROT SERPL-MCNC: 6.5 G/DL
RBC # BLD: 4.59 M/UL
RBC # FLD: 14.2 %
SODIUM SERPL-SCNC: 136 MMOL/L
WBC # FLD AUTO: 7.74 K/UL

## 2025-05-07 ENCOUNTER — APPOINTMENT (OUTPATIENT)
Dept: INTERNAL MEDICINE | Facility: CLINIC | Age: 70
End: 2025-05-07
Payer: MEDICARE

## 2025-05-07 VITALS
DIASTOLIC BLOOD PRESSURE: 54 MMHG | SYSTOLIC BLOOD PRESSURE: 101 MMHG | RESPIRATION RATE: 15 BRPM | HEIGHT: 70 IN | WEIGHT: 230 LBS | HEART RATE: 90 BPM | BODY MASS INDEX: 32.93 KG/M2

## 2025-05-07 DIAGNOSIS — Z79.2 LONG TERM (CURRENT) USE OF ANTIBIOTICS: ICD-10-CM

## 2025-05-07 DIAGNOSIS — M46.40 DISCITIS, UNSPECIFIED, SITE UNSPECIFIED: ICD-10-CM

## 2025-05-07 PROCEDURE — 99215 OFFICE O/P EST HI 40 MIN: CPT

## 2025-05-08 ENCOUNTER — NON-APPOINTMENT (OUTPATIENT)
Age: 70
End: 2025-05-08

## 2025-05-09 ENCOUNTER — APPOINTMENT (OUTPATIENT)
Dept: ORTHOPEDIC SURGERY | Facility: CLINIC | Age: 70
End: 2025-05-09
Payer: MEDICARE

## 2025-05-09 DIAGNOSIS — Z98.1 ARTHRODESIS STATUS: ICD-10-CM

## 2025-05-09 PROBLEM — Z79.2 RECEIVING INTRAVENOUS ANTIBIOTIC TREATMENT AT HOME: Status: ACTIVE | Noted: 2025-05-09

## 2025-05-09 PROCEDURE — 72040 X-RAY EXAM NECK SPINE 2-3 VW: CPT

## 2025-05-09 PROCEDURE — 99024 POSTOP FOLLOW-UP VISIT: CPT

## 2025-05-09 RX ORDER — DICLOFENAC SODIUM 75 MG/1
75 TABLET, DELAYED RELEASE ORAL
Qty: 60 | Refills: 0 | Status: ACTIVE | COMMUNITY
Start: 2025-05-09 | End: 1900-01-01

## 2025-05-19 LAB
ALBUMIN SERPL ELPH-MCNC: 4.1 G/DL
ALP BLD-CCNC: 66 U/L
ALT SERPL-CCNC: 30 U/L
ANION GAP SERPL CALC-SCNC: 14 MMOL/L
AST SERPL-CCNC: 29 U/L
BASOPHILS # BLD AUTO: 0.05 K/UL
BASOPHILS NFR BLD AUTO: 0.7 %
BILIRUB SERPL-MCNC: 0.2 MG/DL
BUN SERPL-MCNC: 17 MG/DL
CALCIUM SERPL-MCNC: 9 MG/DL
CHLORIDE SERPL-SCNC: 105 MMOL/L
CHOLEST SERPL-MCNC: 115 MG/DL
CO2 SERPL-SCNC: 22 MMOL/L
CREAT SERPL-MCNC: 0.88 MG/DL
EGFRCR SERPLBLD CKD-EPI 2021: 93 ML/MIN/1.73M2
EOSINOPHIL # BLD AUTO: 0.21 K/UL
EOSINOPHIL NFR BLD AUTO: 3.1 %
GLUCOSE SERPL-MCNC: 148 MG/DL
HCT VFR BLD CALC: 39 %
HDLC SERPL-MCNC: 33 MG/DL
HGB BLD-MCNC: 12 G/DL
IMM GRANULOCYTES NFR BLD AUTO: 0.1 %
LDLC SERPL-MCNC: 55 MG/DL
LYMPHOCYTES # BLD AUTO: 1.51 K/UL
LYMPHOCYTES NFR BLD AUTO: 22.2 %
MAN DIFF?: NORMAL
MCHC RBC-ENTMCNC: 28 PG
MCHC RBC-ENTMCNC: 30.8 G/DL
MCV RBC AUTO: 90.9 FL
MONOCYTES # BLD AUTO: 0.62 K/UL
MONOCYTES NFR BLD AUTO: 9.1 %
NEUTROPHILS # BLD AUTO: 4.4 K/UL
NEUTROPHILS NFR BLD AUTO: 64.8 %
NONHDLC SERPL-MCNC: 82 MG/DL
PLATELET # BLD AUTO: 200 K/UL
POTASSIUM SERPL-SCNC: 4.6 MMOL/L
PROT SERPL-MCNC: 6.1 G/DL
RBC # BLD: 4.29 M/UL
RBC # FLD: 15 %
SODIUM SERPL-SCNC: 141 MMOL/L
TRIGL SERPL-MCNC: 161 MG/DL
TSH SERPL-ACNC: 1.71 UIU/ML
WBC # FLD AUTO: 6.8 K/UL

## 2025-05-20 LAB
CREAT SPEC-SCNC: 101 MG/DL
ESTIMATED AVERAGE GLUCOSE: 140 MG/DL
HBA1C MFR BLD HPLC: 6.5 %
MICROALBUMIN 24H UR DL<=1MG/L-MCNC: <1.2 MG/DL
MICROALBUMIN/CREAT 24H UR-RTO: NORMAL MG/G

## 2025-05-22 ENCOUNTER — APPOINTMENT (OUTPATIENT)
Dept: NEUROLOGY | Facility: CLINIC | Age: 70
End: 2025-05-22

## 2025-05-26 ENCOUNTER — TRANSCRIPTION ENCOUNTER (OUTPATIENT)
Age: 70
End: 2025-05-26

## 2025-05-27 ENCOUNTER — APPOINTMENT (OUTPATIENT)
Dept: ENDOCRINOLOGY | Facility: CLINIC | Age: 70
End: 2025-05-27
Payer: MEDICARE

## 2025-05-27 VITALS
BODY MASS INDEX: 33.79 KG/M2 | WEIGHT: 236 LBS | SYSTOLIC BLOOD PRESSURE: 154 MMHG | DIASTOLIC BLOOD PRESSURE: 78 MMHG | RESPIRATION RATE: 16 BRPM | HEIGHT: 70 IN | HEART RATE: 77 BPM | OXYGEN SATURATION: 97 %

## 2025-05-27 DIAGNOSIS — E78.00 PURE HYPERCHOLESTEROLEMIA, UNSPECIFIED: ICD-10-CM

## 2025-05-27 DIAGNOSIS — I10 ESSENTIAL (PRIMARY) HYPERTENSION: ICD-10-CM

## 2025-05-27 LAB — GLUCOSE BLDC GLUCOMTR-MCNC: 141

## 2025-05-27 PROCEDURE — G2211 COMPLEX E/M VISIT ADD ON: CPT

## 2025-05-27 PROCEDURE — 99214 OFFICE O/P EST MOD 30 MIN: CPT

## 2025-05-27 RX ORDER — TIRZEPATIDE 5 MG/.5ML
5 INJECTION, SOLUTION SUBCUTANEOUS
Qty: 3 | Refills: 1 | Status: ACTIVE | COMMUNITY
Start: 2025-05-27 | End: 1900-01-01

## 2025-05-28 ENCOUNTER — APPOINTMENT (OUTPATIENT)
Dept: INTERNAL MEDICINE | Facility: CLINIC | Age: 70
End: 2025-05-28
Payer: MEDICARE

## 2025-05-28 VITALS
WEIGHT: 236 LBS | HEIGHT: 70 IN | SYSTOLIC BLOOD PRESSURE: 132 MMHG | BODY MASS INDEX: 33.79 KG/M2 | DIASTOLIC BLOOD PRESSURE: 72 MMHG

## 2025-05-28 DIAGNOSIS — Z79.2 LONG TERM (CURRENT) USE OF ANTIBIOTICS: ICD-10-CM

## 2025-05-28 DIAGNOSIS — E11.9 TYPE 2 DIABETES MELLITUS W/OUT COMPLICATIONS: ICD-10-CM

## 2025-05-28 PROCEDURE — 99215 OFFICE O/P EST HI 40 MIN: CPT

## 2025-05-28 PROCEDURE — 36415 COLL VENOUS BLD VENIPUNCTURE: CPT

## 2025-05-28 RX ORDER — CIPROFLOXACIN HYDROCHLORIDE 500 MG/1
500 TABLET, FILM COATED ORAL
Qty: 60 | Refills: 0 | Status: ACTIVE | COMMUNITY
Start: 2025-05-28 | End: 1900-01-01

## 2025-05-29 LAB
CRP SERPL-MCNC: 5 MG/L
ERYTHROCYTE [SEDIMENTATION RATE] IN BLOOD BY WESTERGREN METHOD: 6 MM/HR

## 2025-06-06 ENCOUNTER — APPOINTMENT (OUTPATIENT)
Dept: ORTHOPEDIC SURGERY | Facility: CLINIC | Age: 70
End: 2025-06-06

## 2025-06-06 PROCEDURE — 99213 OFFICE O/P EST LOW 20 MIN: CPT | Mod: 24

## 2025-06-06 PROCEDURE — 72040 X-RAY EXAM NECK SPINE 2-3 VW: CPT

## 2025-06-06 RX ORDER — CELECOXIB 100 MG/1
100 CAPSULE ORAL TWICE DAILY
Qty: 60 | Refills: 0 | Status: ACTIVE | COMMUNITY
Start: 2025-06-06 | End: 1900-01-01

## 2025-06-06 RX ORDER — METHYLPREDNISOLONE 4 MG/1
4 TABLET ORAL
Qty: 1 | Refills: 0 | Status: ACTIVE | COMMUNITY
Start: 2025-06-06 | End: 1900-01-01

## 2025-06-27 ENCOUNTER — APPOINTMENT (OUTPATIENT)
Dept: ORTHOPEDIC SURGERY | Facility: CLINIC | Age: 70
End: 2025-06-27
Payer: MEDICARE

## 2025-06-27 PROCEDURE — 72040 X-RAY EXAM NECK SPINE 2-3 VW: CPT

## 2025-06-27 PROCEDURE — 99024 POSTOP FOLLOW-UP VISIT: CPT

## 2025-06-27 RX ORDER — METHYLPREDNISOLONE 4 MG/1
4 TABLET ORAL
Qty: 1 | Refills: 0 | Status: ACTIVE | COMMUNITY
Start: 2025-06-27 | End: 1900-01-01

## 2025-07-03 ENCOUNTER — APPOINTMENT (OUTPATIENT)
Dept: INTERNAL MEDICINE | Facility: CLINIC | Age: 70
End: 2025-07-03

## 2025-07-03 VITALS
HEIGHT: 70 IN | BODY MASS INDEX: 33.64 KG/M2 | WEIGHT: 235 LBS | SYSTOLIC BLOOD PRESSURE: 110 MMHG | DIASTOLIC BLOOD PRESSURE: 60 MMHG

## 2025-07-03 PROBLEM — Z79.2 LONG TERM CURRENT USE OF ANTIBIOTICS: Status: ACTIVE | Noted: 2025-07-03

## 2025-07-03 PROCEDURE — 99215 OFFICE O/P EST HI 40 MIN: CPT

## 2025-07-06 PROBLEM — M46.40 DISKITIS: Status: ACTIVE | Noted: 2025-05-07

## 2025-07-06 LAB
ALBUMIN SERPL ELPH-MCNC: 4.4 G/DL
ALP BLD-CCNC: 57 U/L
ALT SERPL-CCNC: 34 U/L
ANION GAP SERPL CALC-SCNC: 14 MMOL/L
AST SERPL-CCNC: 37 U/L
BASOPHILS # BLD AUTO: 0.04 K/UL
BASOPHILS NFR BLD AUTO: 0.6 %
BILIRUB SERPL-MCNC: 0.3 MG/DL
BUN SERPL-MCNC: 15 MG/DL
CALCIUM SERPL-MCNC: 9.4 MG/DL
CHLORIDE SERPL-SCNC: 105 MMOL/L
CO2 SERPL-SCNC: 23 MMOL/L
CREAT SERPL-MCNC: 0.81 MG/DL
CRP SERPL-MCNC: <3 MG/L
EGFRCR SERPLBLD CKD-EPI 2021: 95 ML/MIN/1.73M2
EOSINOPHIL # BLD AUTO: 0.26 K/UL
EOSINOPHIL NFR BLD AUTO: 4.2 %
ERYTHROCYTE [SEDIMENTATION RATE] IN BLOOD BY WESTERGREN METHOD: 6 MM/HR
GLUCOSE SERPL-MCNC: 81 MG/DL
HCT VFR BLD CALC: 44.4 %
HGB BLD-MCNC: 13.4 G/DL
IMM GRANULOCYTES NFR BLD AUTO: 0.2 %
LYMPHOCYTES # BLD AUTO: 1.16 K/UL
LYMPHOCYTES NFR BLD AUTO: 18.6 %
MAN DIFF?: NORMAL
MCHC RBC-ENTMCNC: 27 PG
MCHC RBC-ENTMCNC: 30.2 G/DL
MCV RBC AUTO: 89.5 FL
MONOCYTES # BLD AUTO: 0.5 K/UL
MONOCYTES NFR BLD AUTO: 8 %
NEUTROPHILS # BLD AUTO: 4.28 K/UL
NEUTROPHILS NFR BLD AUTO: 68.4 %
PLATELET # BLD AUTO: 187 K/UL
POTASSIUM SERPL-SCNC: 4.8 MMOL/L
PROT SERPL-MCNC: 6.9 G/DL
RBC # BLD: 4.96 M/UL
RBC # FLD: 14.9 %
SODIUM SERPL-SCNC: 141 MMOL/L
WBC # FLD AUTO: 6.25 K/UL

## 2025-07-31 ENCOUNTER — NON-APPOINTMENT (OUTPATIENT)
Age: 70
End: 2025-07-31

## 2025-08-07 RX ORDER — METHYLPREDNISOLONE 4 MG/1
4 TABLET ORAL
Qty: 1 | Refills: 0 | Status: ACTIVE | COMMUNITY
Start: 2025-08-07 | End: 1900-01-01

## 2025-08-25 ENCOUNTER — APPOINTMENT (OUTPATIENT)
Dept: ENDOCRINOLOGY | Facility: CLINIC | Age: 70
End: 2025-08-25
Payer: MEDICARE

## 2025-08-25 VITALS
HEIGHT: 70 IN | WEIGHT: 225 LBS | BODY MASS INDEX: 32.21 KG/M2 | OXYGEN SATURATION: 96 % | SYSTOLIC BLOOD PRESSURE: 122 MMHG | DIASTOLIC BLOOD PRESSURE: 70 MMHG | HEART RATE: 73 BPM

## 2025-08-25 DIAGNOSIS — E78.00 PURE HYPERCHOLESTEROLEMIA, UNSPECIFIED: ICD-10-CM

## 2025-08-25 DIAGNOSIS — E53.8 DEFICIENCY OF OTHER SPECIFIED B GROUP VITAMINS: ICD-10-CM

## 2025-08-25 DIAGNOSIS — E11.9 TYPE 2 DIABETES MELLITUS W/OUT COMPLICATIONS: ICD-10-CM

## 2025-08-25 DIAGNOSIS — I10 ESSENTIAL (PRIMARY) HYPERTENSION: ICD-10-CM

## 2025-08-25 DIAGNOSIS — G62.9 POLYNEUROPATHY, UNSPECIFIED: ICD-10-CM

## 2025-08-25 DIAGNOSIS — E55.9 VITAMIN D DEFICIENCY, UNSPECIFIED: ICD-10-CM

## 2025-08-25 LAB
GLUCOSE BLDC GLUCOMTR-MCNC: 121
HBA1C MFR BLD HPLC: 6.9
LDLC SERPL DIRECT ASSAY-MCNC: 50
MICROALBUMIN/CREAT 24H UR-RTO: <4
TSH SERPL-ACNC: 1.13

## 2025-08-25 PROCEDURE — 99214 OFFICE O/P EST MOD 30 MIN: CPT

## 2025-08-25 PROCEDURE — 82962 GLUCOSE BLOOD TEST: CPT

## 2025-08-25 PROCEDURE — G2211 COMPLEX E/M VISIT ADD ON: CPT

## 2025-09-15 ENCOUNTER — NON-APPOINTMENT (OUTPATIENT)
Age: 70
End: 2025-09-15

## (undated) DEVICE — GOWN ROYAL SILK XL

## (undated) DEVICE — PACK NEURO

## (undated) DEVICE — PREP DURAPREP 26CC

## (undated) DEVICE — DRSG STERISTRIPS 0.5 X 4"

## (undated) DEVICE — DRAPE TOWEL BLUE 17" X 24"

## (undated) DEVICE — DRAPE 3/4 SHEET 52X76"

## (undated) DEVICE — WOUND IRR SURGIPHOR

## (undated) DEVICE — POSITIONER FOAM EGG CRATE ULNAR 2PCS (PINK)

## (undated) DEVICE — Device

## (undated) DEVICE — WARMING BLANKET UPPER ADULT

## (undated) DEVICE — DRAPE XL SHEET 77X98"

## (undated) DEVICE — DRSG DERMABOND 0.7ML

## (undated) DEVICE — GLV 6.5 PROTEXIS (BLUE)

## (undated) DEVICE — MARKING PEN W RULER

## (undated) DEVICE — SUT VICRYL PLUS 0 27" CT-2 UNDYED

## (undated) DEVICE — ELCTR GROUNDING PAD ADULT COVIDIEN

## (undated) DEVICE — DRSG TEGADERM 4 X 4.75"

## (undated) DEVICE — FOLEY TRAY 16FR LF URINE METER SURESTEP

## (undated) DEVICE — DRAPE SPLIT SHEET 77" X 108"

## (undated) DEVICE — SUT VICRYL 3-0 18" SH UNDYED (POP-OFF)

## (undated) DEVICE — SSH-ESU BOVIE MACHINE T7E14835DX: Type: DURABLE MEDICAL EQUIPMENT

## (undated) DEVICE — TAPE SILK 3"

## (undated) DEVICE — DRAPE INSTRUMENT POUCH 6.75" X 11"

## (undated) DEVICE — DRAIN JACKSON PRATT 7FR ROUND END NO TROCAR

## (undated) DEVICE — SOL IRR POUR H2O 1000ML

## (undated) DEVICE — ELCTR BOVIE PENCIL BLADE 10FT

## (undated) DEVICE — VENODYNE/SCD SLEEVE CALF MEDIUM

## (undated) DEVICE — GLV 8 PROTEXIS (WHITE)

## (undated) DEVICE — POSITIONER FOAM LAMINECTOMY ARM CRADLE (PINK)

## (undated) DEVICE — STRYKER SONOPET IQ TIP 11CM APEX KNIFE

## (undated) DEVICE — GLV 8 PROTEXIS (BLUE)

## (undated) DEVICE — DRAPE TOWEL 1000 SMALL 17" X 11"

## (undated) DEVICE — SPONGE PEANUT AUTO COUNT

## (undated) DEVICE — POSITIONER JACKSON TABLE PRONEVIEW CUSHION, CHEST, HIP, THIGH PADS

## (undated) DEVICE — NDL SPINAL 18G X 3.5" (PINK)

## (undated) DEVICE — SUT VICRYL PLUS 2-0 18" CP-2 UNDYED (POP-OFF)

## (undated) DEVICE — SOL IRR POUR NS 0.9% 1000ML

## (undated) DEVICE — FRAZIER CONNECTING TUBE 2FT 5MM

## (undated) DEVICE — WARMING BLANKET LOWER ADULT

## (undated) DEVICE — STRYKER SONOPET IQ TIP 12CM CLAW

## (undated) DEVICE — DRAPE 1/2 SHEET 40X57"

## (undated) DEVICE — FORCEP BIPOLAR SPETZLER 1.5MM 20CM SLIM

## (undated) DEVICE — SUT MONOCRYL 3-0 27" PS-2 UNDYED

## (undated) DEVICE — DRAPE LARGE SHEET 72X85"

## (undated) DEVICE — STRYKER SONOPET IQ TUBING SET

## (undated) DEVICE — DRAPE C ARM UNIVERSAL

## (undated) DEVICE — TUBING FOR SMOKE EVACUATOR (PURPLE END)

## (undated) DEVICE — DRAIN RESERVOIR FOR JACKSON PRATT 100CC CARDINAL

## (undated) DEVICE — GLV 6.5 PROTEXIS (WHITE)

## (undated) DEVICE — DRSG TELFA 3 X 4

## (undated) DEVICE — TUBING BIPOLAR IRRIGATOR AND CORD SET